# Patient Record
(demographics unavailable — no encounter records)

---

## 2024-10-11 NOTE — BEGINNING OF VISIT
[0] : 2) Feeling down, depressed, or hopeless: Not at all (0) [[id number]] : 0 [Pain Scale: ___] : On a scale of 1-10, today the patient's pain is a(n) [unfilled]. [Never] : Never [Date Discussed (MM/DD/YY): ___] : Discussed: [unfilled]

## 2024-10-11 NOTE — BEGINNING OF VISIT
[0] : 2) Feeling down, depressed, or hopeless: Not at all (0) [[id number]] : 0 [Pain Scale: ___] : On a scale of 1-10, today the patient's pain is a(n) [unfilled]. [Never] : Never [Date Discussed (MM/DD/YY): ___] : Discussed: [unfilled]

## 2024-10-11 NOTE — BEGINNING OF VISIT
[0] : 2) Feeling down, depressed, or hopeless: Not at all (0) [[id number]] : 0 [Pain Scale: ___] : On a scale of 1-10, today the patient's pain is a(n) [unfilled]. [Never] : Never [Date Discussed (MM/DD/YY): ___] : Discussed: [unfilled]

## 2024-10-11 NOTE — BEGINNING OF VISIT
[0] : 2) Feeling down, depressed, or hopeless: Not at all (0) [JQH3Rxqbu] : 0 [Pain Scale: ___] : On a scale of 1-10, today the patient's pain is a(n) [unfilled]. [Never] : Never [Date Discussed (MM/DD/YY): ___] : Discussed: [unfilled]

## 2024-10-11 NOTE — BEGINNING OF VISIT
[0] : 2) Feeling down, depressed, or hopeless: Not at all (0) [[id number]] : 0 [Pain Scale: ___] : On a scale of 1-10, today the patient's pain is a(n) [unfilled]. [Never] : Never [Date Discussed (MM/DD/YY): ___] : Discussed: [unfilled]

## 2024-10-12 NOTE — REASON FOR VISIT
[Follow-Up Visit] : a follow-up [Family Member] : family member [FreeTextEntry2] : metastatic breast ca

## 2024-10-12 NOTE — DISCUSSION/SUMMARY
[FreeTextEntry1] : pt at [facility] getting RT declined surgical fixation pain is controlled with IV meds she is able to ambulate a little with a walker but mostly in bed cx chemo this week rto 10/11

## 2024-10-12 NOTE — HISTORY OF PRESENT ILLNESS
[Disease: _____________________] : Disease: [unfilled] [de-identified] :  This is a very pleasant 61-year-old female presented today for an evaluation for metastatic triple negative breast cancer.  Patient reports she noticed a large lump under her right arm and lymphedema in July 2023.  She went to the emergency room in [place] and workup showed metastatic triple negative breast cancer.  She underwent a right lymph node biopsy which was reportedly triple negative.  Biopsy report is not available for review.  She started palliative intent Taxol and pembrolizumab July 2023.  A PET/CT September 2023 showed mixed response, and she continued on the same regimen.  A PET/CT in January 2024 showed marked progression of disease in the bones.  Treatment was changed to carboplatin gemcitabine and pembrolizumab in February 2024.  She reportedly developed neutropenia and needed Neulasta for subsequent cycles.  PET/CT May 2024 showed stable disease.  Her last chemotherapy treatment was on 5/17/2024.  She decided to move to [place] close to family.  She has not had any treatment since then.  She reports she wanted to take a treatment break and has been taking holistic treatments.  She was disappointed that  neck and armpit masses grew during treatment break.  She was seen at [facility] last month and is referred here for continuation of care.  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  She is fasting 16 to 24 hours and has lost 40 pounds in the last 4 months.  She has significant right hip and left leg pain.  She is taking Dilaudid as needed and gabapentin.  Currently pain is 5 out of 10.  She walks with a cane and pain gets worse on walking after 1-2 blocks.  She had radiation to spine last year and developed significant toxicity and does not want to take radiation anymore. She is currently getting home PT. she reports skin discoloration from radiation and Neulasta.  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  Port has not been placed in 3 months.  She has double-lumen port.  She reports BRCA testing was done but she is not aware of results.  She lives with her parents and her daughter is supporting her.  8/23/2024 Patient presents for Trodelvy Cycle 1, Day 1. She is accompanied by her daughter. All questions asked/answered prior to initiation of treatment.  Patient underwent Port Study on 8/2124 at [facility].  Procedure Findings demonstrated a left-sided double port with tip of catheter in the SVC. The left port was accessed under sterile conditions with blood return and appropriate flushing was demonstrated. Hand injection of contrast demonstrates filling of port well and lumen without evidence of fibrin sheath. Unfortunately, multiple attempts were made with right port however it was unsuccessful. The plan per the Interventional Radiologist included: 1. Left port may be accessed and utilized for her upcoming treatment of chemotherapy. 2. Suggestion of possible replacement with a single lumen Mediport. Ms. Singh c/o of significant pain 10/10 on the pain scale. Reports she ran out of Hydromorphone and Gabapentin that was dispensed in the ER since last week. Ms. Singh reported that her pain was well controlled when taking Hydromorphone and Gabapentin as prescribed. Refills sent to [facility], and delivered at chairside. 4mg of Hydromorphone prescribed to be given at chairside prior to treatment. 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 Pathologic fractures in the right superior pubic ramus and left proximal fibula were noted on recent PET CT scan completed on 8/21/2024. Patient continues to take Eliquis daily (5 mg PO BID) for port r/t thrombus. Patient denies any s/s c/w easily bleeding or bruising.  Ms. Singh requested to speak with Nutrition today. Nutrition team was able to see the patient at the chairside. Appreciate note and recommendations. RTO: with each Cycle of Trodelvy, on Day 1. Appointments reviewed with patient and print out given.   8/28/2024 Patient presents today for repeat blood work and to complete [employer] Testing.  We again discussed the recent results of PTT/INR lab work that was significantly elevated on 8/23/2024. Patient was contacted on 8/26/2024 to initially discuss the elevated lab results. Ms. Singh reported that she was taking the Eliquis 5 mg PO BID as indicated. Upon questioning the patient further regarding the matter, she stated she ran out of the medication and needed a refill. Patient continues to deny as signs of bleeding or bruising. Results of PTT/INR today were WNL, and Eliquis 5mg PO BID was ordered to [facility].   Patient reported taking several supplements to include: Turmeric, Vitamin E, Black Seed Oil and several other non-FDA approved mixed/multi-vitamins up until Day 1 of treatment (8/23/2024). We discussed that these particular supplements may have unknown drug interactions with treatment and can also elevate both PTT & INR. The patient has since discontinued the supplements.   Discussed that [id number] testing is used to identify genomic alterations within the cancer's DNA that may make the patient eligible for a specific therapy/personalized treatment for her cancer. Patient verbalized consent and signed the necessary paperwork. Our office to convey results when they are made available.   Ms. Singh reports her pain is slightly improved since she started re-taking Dilaudid and Gabapentin as prescribed. Referral was made to Palliative Care, and an appointment was scheduled for 18 September 2024.   8/30/2024: day8 of Trodelvy,  She reports significant fatigue and altered taste x 3-4 days after chemo. She was able to function, maintained PO intake, weight stable. She had mild nausea, took Reglan, no vomiting, no diarrhea or mouth sores. No neuropathy, no fevers. Hb low, discussed PRBC tx PET baseline 8/2024 d/w her. She has several pathologic fractures. She doesnt want to go to ER, thinks they arent new. She has been hesitant about RT. Discussed importance of ortho onc consultation and pall RT for bone fractures and pain. No wt beaning until ortho consult.  She is on Eliquis 5 mg PO BID for port-related thrombus. PT/INR was significantly high, normalized on repeat labs  Has R shoulder, pelvis, LE pain somewhat controlled on Dilaudid every 4 hours, gabapentin Q3H. A. Pending palliative care appointment for pain management next month.  Does not have appetite but is taking small portions of meals. lso taking ensure Holding supplements 2 days before and after the chemo: Turmeric, Vitamin E, Black Seed Oil and several other non-FDA approved mixed/multi-vitamins up  [id number] testing sent during last visit Palliative Care, and an appointment was scheduled for 18 September 2024.   9/20/2024 Patient presents today for Cycle 2, Day 8 of Trodelvy.  She reports significant fatigue and altered taste x 3-4 days after chemo. She remains able to function, maintained PO intake, and her weight is stable. She continues to experience mild nausea, but it is improving, and no Reglan was needed after Cycle 2, Day 1. Denies fevers, vomiting, diarrhea, neuropathy or mouth sores.  Discussed low Hgb (8.1 g/dL), and possible need for PRBC transfusion. At this time, the patient denies being symptomatic and wishes to see if her bone marrow can recover on its own. She has been attending numerous appointments (Palliative, Orthopedics, RT) and is becoming overwhelmed.  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 Denies any SOB or palpitations. Ms. Singh has established care with Palliative Care on 18 September 2024 (follow-up scheduled in one week/Telemedicine Visit). Patient reports she enjoyed meeting this provider and was thankful for her recommendations to change the Hydromorphone from Q6 hours to Q4 hours. Additionally, she is now taking Tizanidine 2mg, 1 to 2 tablets every 8 hours for muscle spasms. She reports this medication has made a big difference and reports her overall pain is much more manageable/tolerable. Patient will also continue to take Gabapentin 300 mg TID. She is also now on a stricter bowel regimen taking Colace TID and Miralax daily. She is having BM's every other day.  Ms. Singh also met with Dr. Serena Bustos from Radiation Oncology on 9 September 2024. She reports being much more open to the idea of RT for pain relief and signed informed consent at the initial consultation. It was discussed at that visit a plan of care will be created once the patient established care with Dr. Lauren.  Ms. Singh established care with Dr. Subhash Lauren from Orthopedic Surgery. Patient was offered Right Hip Hemiarthroplasty and surgical stabilization of the Left femur. The patient declined surgical intervention and was instructed to return to the office in 1 month for repeat x-ray imaging.  Patient continues to hold supplements 2 days before and after the chemo: Turmeric, Vitamin E, Black Seed Oil and several other non-FDA approved mixed/multi-vitamins. RTO: with each Cycle of Trodelvy, on Day 8.  [de-identified] : This is a very pleasant 61-year-old female diagnosed with metastatic triple negative breast cancer (metastatic disease to bones and lymph nodes) in July 2023, received Taxol Keytruda 7/202 3-1/2024, POD bones, Carboplatin Gemcitabine and Keytruda 2/202 4-5/2024, then she decided to take a treatment break.  She has been off treatment and reports worsening of palpable metastatic lymph nodes and worsening bone pain. Trodelvy initiated on 8/23/2024.

## 2024-10-12 NOTE — PHYSICAL EXAM
[Ambulatory and capable of all self care but unable to carry out any work activities] : Status 2- Ambulatory and capable of all self care but unable to carry out any work activities. Up and about more than 50% of waking hours [Normal] : affect appropriate [de-identified] : in wheelchair  [de-identified] : large right axillary and right cervical LN

## 2024-10-12 NOTE — PHYSICAL EXAM
[Ambulatory and capable of all self care but unable to carry out any work activities] : Status 2- Ambulatory and capable of all self care but unable to carry out any work activities. Up and about more than 50% of waking hours [Normal] : affect appropriate [de-identified] : in wheelchair  [de-identified] : large right axillary and right cervical LN

## 2024-10-12 NOTE — HISTORY OF PRESENT ILLNESS
[Disease: _____________________] : Disease: [unfilled] [de-identified] :  This is a very pleasant 61-year-old female presented today for an evaluation for metastatic triple negative breast cancer.  Patient reports she noticed a large lump under her right arm and lymphedema in July 2023.  She went to the emergency room in [place] and workup showed metastatic triple negative breast cancer.  She underwent a right lymph node biopsy which was reportedly triple negative.  Biopsy report is not available for review.  She started palliative intent Taxol and pembrolizumab July 2023.  A PET/CT September 2023 showed mixed response, and she continued on the same regimen.  A PET/CT in January 2024 showed marked progression of disease in the bones.  Treatment was changed to carboplatin gemcitabine and pembrolizumab in February 2024.  She reportedly developed neutropenia and needed Neulasta for subsequent cycles.  PET/CT May 2024 showed stable disease.  Her last chemotherapy treatment was on 5/17/2024.  She decided to move to [place] close to family.  She has not had any treatment since then.  She reports she wanted to take a treatment break and has been taking holistic treatments.  She was disappointed that  neck and armpit masses grew during treatment break.  She was seen at [facility] last month and is referred here for continuation of care.  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  She is fasting 16 to 24 hours and has lost 40 pounds in the last 4 months.  She has significant right hip and left leg pain.  She is taking Dilaudid as needed and gabapentin.  Currently pain is 5 out of 10.  She walks with a cane and pain gets worse on walking after 1-2 blocks.  She had radiation to spine last year and developed significant toxicity and does not want to take radiation anymore. She is currently getting home PT. she reports skin discoloration from radiation and Neulasta.  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  Port has not been placed in 3 months.  She has double-lumen port.  She reports BRCA testing was done but she is not aware of results.  She lives with her parents and her daughter is supporting her.  8/23/2024 Patient presents for Trodelvy Cycle 1, Day 1. She is accompanied by her daughter. All questions asked/answered prior to initiation of treatment.  Patient underwent Port Study on 8/2124 at [facility].  Procedure Findings demonstrated a left-sided double port with tip of catheter in the SVC. The left port was accessed under sterile conditions with blood return and appropriate flushing was demonstrated. Hand injection of contrast demonstrates filling of port well and lumen without evidence of fibrin sheath. Unfortunately, multiple attempts were made with right port however it was unsuccessful. The plan per the Interventional Radiologist included: 1. Left port may be accessed and utilized for her upcoming treatment of chemotherapy. 2. Suggestion of possible replacement with a single lumen Mediport. Ms. Singh c/o of significant pain 10/10 on the pain scale. Reports she ran out of Hydromorphone and Gabapentin that was dispensed in the ER since last week. Ms. Singh reported that her pain was well controlled when taking Hydromorphone and Gabapentin as prescribed. Refills sent to "[facility], and delivered at chairside. 4mg of Hydromorphone prescribed to be given at chairside prior to treatment. 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 Pathologic fractures in the right superior pubic ramus and left proximal fibula were noted on recent PET CT scan completed on 8/21/2024. Patient continues to take Eliquis daily (5 mg PO BID) for port r/t thrombus. Patient denies any s/s c/w easily bleeding or bruising.  Ms. Singh requested to speak with Nutrition today. Nutrition team was able to see the patient at the chairside. Appreciate note and recommendations. RTO: with each Cycle of Trodelvy, on Day 1. Appointments reviewed with patient and print out given.   8/28/2024 Patient presents today for repeat blood work and to complete [employer] Testing.  We again discussed the recent results of PTT/INR lab work that was significantly elevated on 8/23/2024. Patient was contacted on 8/26/2024 to initially discuss the elevated lab results. Ms. Singh reported that she was taking the Eliquis 5 mg PO BID as indicated. Upon questioning the patient further regarding the matter, she stated she ran out of the medication and needed a refill. Patient continues to deny as signs of bleeding or bruising. Results of PTT/INR today were WNL, and Eliquis 5mg PO BID was ordered to "[facility].   Patient reported taking several supplements to include: Turmeric, Vitamin E, Black Seed Oil and several other non-FDA approved mixed/multi-vitamins up until Day 1 of treatment (8/23/2024). We discussed that these particular supplements may have unknown drug interactions with treatment and can also elevate both PTT & INR. The patient has since discontinued the supplements.   Discussed that [id number] testing is used to identify genomic alterations within the cancer's DNA that may make the patient eligible for a specific therapy/personalized treatment for her cancer. Patient verbalized consent and signed the necessary paperwork. Our office to convey results when they are made available.   Ms. Singh reports her pain is slightly improved since she started re-taking Dilaudid and Gabapentin as prescribed. Referral was made to Palliative Care, and an appointment was scheduled for 18 September 2024.   8/30/2024: day8 of Trodelvy,  She reports significant fatigue and altered taste x 3-4 days after chemo. She was able to function, maintained PO intake, weight stable. She had mild nausea, took Reglan, no vomiting, no diarrhea or mouth sores. No neuropathy, no fevers. Hb low, discussed PRBC tx PET baseline 8/2024 d/w her. She has several pathologic fractures. She doesnt want to go to ER, thinks they arent new. She has been hesitant about RT. Discussed importance of ortho onc consultation and pall RT for bone fractures and pain. No wt beaning until ortho consult.  She is on Eliquis 5 mg PO BID for port-related thrombus. PT/INR was significantly high, normalized on repeat labs  Has R shoulder, pelvis, LE pain somewhat controlled on Dilaudid every 4 hours, gabapentin Q3H. A. Pending palliative care appointment for pain management next month.  Does not have appetite but is taking small portions of meals. lso taking ensure Holding supplements 2 days before and after the chemo: Turmeric, Vitamin E, Black Seed Oil and several other non-FDA approved mixed/multi-vitamins up  [id number] testing sent during last visit Palliative Care, and an appointment was scheduled for 18 September 2024.   9/20/2024 Patient presents today for Cycle 2, Day 8 of Trodelvy.  She reports significant fatigue and altered taste x 3-4 days after chemo. She remains able to function, maintained PO intake, and her weight is stable. She continues to experience mild nausea, but it is improving, and no Reglan was needed after Cycle 2, Day 1. Denies fevers, vomiting, diarrhea, neuropathy or mouth sores.  Discussed low Hgb (8.1 g/dL), and possible need for PRBC transfusion. At this time, the patient denies being symptomatic and wishes to see if her bone marrow can recover on its own. She has been attending numerous appointments (Palliative, Orthopedics, RT) and is becoming overwhelmed.  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 Denies any SOB or palpitations. Ms. Singh has established care with Palliative Care on 18 September 2024 (follow-up scheduled in one week/Telemedicine Visit). Patient reports she enjoyed meeting this provider and was thankful for her recommendations to change the Hydromorphone from Q6 hours to Q4 hours. Additionally, she is now taking Tizanidine 2mg, 1 to 2 tablets every 8 hours for muscle spasms. She reports this medication has made a big difference and reports her overall pain is much more manageable/tolerable. Patient will also continue to take Gabapentin 300 mg TID. She is also now on a stricter bowel regimen taking Colace TID and Miralax daily. She is having BM's every other day.  Ms. Singh also met with Dr. Serena Bustos from Radiation Oncology on 9 September 2024. She reports being much more open to the idea of RT for pain relief and signed informed consent at the initial consultation. It was discussed at that visit a plan of care will be created once the patient established care with Dr. Lauren.  Ms. Singh established care with Dr. Subhash Lauren from Orthopedic Surgery. Patient was offered Right Hip Hemiarthroplasty and surgical stabilization of the Left femur. The patient declined surgical intervention and was instructed to return to the office in 1 month for repeat x-ray imaging.  Patient continues to hold supplements 2 days before and after the chemo: Turmeric, Vitamin E, Black Seed Oil and several other non-FDA approved mixed/multi-vitamins. RTO: with each Cycle of Trodelvy, on Day 8.  [de-identified] : This is a very pleasant 61-year-old female diagnosed with metastatic triple negative breast cancer (metastatic disease to bones and lymph nodes) in July 2023, received Taxol Keytruda 7/202 3-1/2024, POD bones, Carboplatin Gemcitabine and Keytruda 2/202 4-5/2024, then she decided to take a treatment break.  She has been off treatment and reports worsening of palpable metastatic lymph nodes and worsening bone pain. Trodelvy initiated on 8/23/2024.

## 2024-10-12 NOTE — DISCUSSION/SUMMARY
[FreeTextEntry1] : pt at [facility] getting RT declined surgical fixation pain is controlled with IV meds she is able to ambulate a little with a walker but mostly in bed cx chemo this week rto 10/11

## 2024-10-12 NOTE — HISTORY OF PRESENT ILLNESS
[Disease: _____________________] : Disease: [unfilled] [de-identified] :  This is a very pleasant 61-year-old female presented today for an evaluation for metastatic triple negative breast cancer.  Patient reports she noticed a large lump under her right arm and lymphedema in July 2023.  She went to the emergency room in [place] and workup showed metastatic triple negative breast cancer.  She underwent a right lymph node biopsy which was reportedly triple negative.  Biopsy report is not available for review.  She started palliative intent Taxol and pembrolizumab July 2023.  A PET/CT September 2023 showed mixed response, and she continued on the same regimen.  A PET/CT in January 2024 showed marked progression of disease in the bones.  Treatment was changed to carboplatin gemcitabine and pembrolizumab in February 2024.  She reportedly developed neutropenia and needed Neulasta for subsequent cycles.  PET/CT May 2024 showed stable disease.  Her last chemotherapy treatment was on 5/17/2024.  She decided to move to [place] close to family.  She has not had any treatment since then.  She reports she wanted to take a treatment break and has been taking holistic treatments.  She was disappointed that  neck and armpit masses grew during treatment break.  She was seen at [facility] last month and is referred here for continuation of care.  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  She is fasting 16 to 24 hours and has lost 40 pounds in the last 4 months.  She has significant right hip and left leg pain.  She is taking Dilaudid as needed and gabapentin.  Currently pain is 5 out of 10.  She walks with a cane and pain gets worse on walking after 1-2 blocks.  She had radiation to spine last year and developed significant toxicity and does not want to take radiation anymore. She is currently getting home PT. she reports skin discoloration from radiation and Neulasta.  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  Port has not been placed in 3 months.  She has double-lumen port.  She reports BRCA testing was done but she is not aware of results.  She lives with her parents and her daughter is supporting her.  8/23/2024 Patient presents for Trodelvy Cycle 1, Day 1. She is accompanied by her daughter. All questions asked/answered prior to initiation of treatment.  Patient underwent Port Study on 8/2124 at [facility].  Procedure Findings demonstrated a left-sided double port with tip of catheter in the SVC. The left port was accessed under sterile conditions with blood return and appropriate flushing was demonstrated. Hand injection of contrast demonstrates filling of port well and lumen without evidence of fibrin sheath. Unfortunately, multiple attempts were made with right port however it was unsuccessful. The plan per the Interventional Radiologist included: 1. Left port may be accessed and utilized for her upcoming treatment of chemotherapy. 2. Suggestion of possible replacement with a single lumen Mediport. Ms. Singh c/o of significant pain 10/10 on the pain scale. Reports she ran out of Hydromorphone and Gabapentin that was dispensed in the ER since last week. Ms. Singh reported that her pain was well controlled when taking Hydromorphone and Gabapentin as prescribed. Refills sent to [facility], and delivered at chairside. 4mg of Hydromorphone prescribed to be given at chairside prior to treatment. 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 Pathologic fractures in the right superior pubic ramus and left proximal fibula were noted on recent PET CT scan completed on 8/21/2024. Patient continues to take Eliquis daily (5 mg PO BID) for port r/t thrombus. Patient denies any s/s c/w easily bleeding or bruising.  Ms. Singh requested to speak with Nutrition today. Nutrition team was able to see the patient at the chairside. Appreciate note and recommendations. RTO: with each Cycle of Trodelvy, on Day 1. Appointments reviewed with patient and print out given.   8/28/2024 Patient presents today for repeat blood work and to complete [employer] Testing.  We again discussed the recent results of PTT/INR lab work that was significantly elevated on 8/23/2024. Patient was contacted on 8/26/2024 to initially discuss the elevated lab results. Ms. Singh reported that she was taking the Eliquis 5 mg PO BID as indicated. Upon questioning the patient further regarding the matter, she stated she ran out of the medication and needed a refill. Patient continues to deny as signs of bleeding or bruising. Results of PTT/INR today were WNL, and Eliquis 5mg PO BID was ordered to [facility].   Patient reported taking several supplements to include: Turmeric, Vitamin E, Black Seed Oil and several other non-FDA approved mixed/multi-vitamins up until Day 1 of treatment (8/23/2024). We discussed that these particular supplements may have unknown drug interactions with treatment and can also elevate both PTT & INR. The patient has since discontinued the supplements.   Discussed that [id number] testing is used to identify genomic alterations within the cancer's DNA that may make the patient eligible for a specific therapy/personalized treatment for her cancer. Patient verbalized consent and signed the necessary paperwork. Our office to convey results when they are made available.   Ms. Singh reports her pain is slightly improved since she started re-taking Dilaudid and Gabapentin as prescribed. Referral was made to Palliative Care, and an appointment was scheduled for 18 September 2024.   8/30/2024: day8 of Trodelvy,  She reports significant fatigue and altered taste x 3-4 days after chemo. She was able to function, maintained PO intake, weight stable. She had mild nausea, took Reglan, no vomiting, no diarrhea or mouth sores. No neuropathy, no fevers. Hb low, discussed [facility] tx PET baseline 8/2024 d/w her. She has several pathologic fractures. She doesnt want to go to ER, thinks they arent new. She has been hesitant about RT. Discussed importance of ortho onc consultation and pall RT for bone fractures and pain. No wt beaning until ortho consult.  She is on Eliquis 5 mg PO BID for port-related thrombus. PT/INR was significantly high, normalized on repeat labs  Has R shoulder, pelvis, LE pain somewhat controlled on Dilaudid every 4 hours, gabapentin Q3H. A. Pending palliative care appointment for pain management next month.  Does not have appetite but is taking small portions of meals. lso taking ensure Holding supplements 2 days before and after the chemo: Turmeric, Vitamin E, Black Seed Oil and several other non-FDA approved mixed/multi-vitamins up  [id number] testing sent during last visit Palliative Care, and an appointment was scheduled for 18 September 2024.   9/20/2024 Patient presents today for Cycle 2, Day 8 of Trodelvy.  She reports significant fatigue and altered taste x 3-4 days after chemo. She remains able to function, maintained PO intake, and her weight is stable. She continues to experience mild nausea, but it is improving, and no Reglan was needed after Cycle 2, Day 1. Denies fevers, vomiting, diarrhea, neuropathy or mouth sores.  Discussed low Hgb (8.1 g/dL), and possible need for PRBC transfusion. At this time, the patient denies being symptomatic and wishes to see if her bone marrow can recover on its own. She has been attending numerous appointments (Palliative, Orthopedics, RT) and is becoming overwhelmed.  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 Denies any SOB or palpitations. Ms. Singh has established care with Palliative Care on 18 September 2024 (follow-up scheduled in one week/Telemedicine Visit). Patient reports she enjoyed meeting this provider and was thankful for her recommendations to change the Hydromorphone from Q6 hours to Q4 hours. Additionally, she is now taking Tizanidine 2mg, 1 to 2 tablets every 8 hours for muscle spasms. She reports this medication has made a big difference and reports her overall pain is much more manageable/tolerable. Patient will also continue to take Gabapentin 300 mg TID. She is also now on a stricter bowel regimen taking Colace TID and Miralax daily. She is having BM's every other day.  Ms. Singh also met with Dr. Serena Bustos from Radiation Oncology on 9 September 2024. She reports being much more open to the idea of RT for pain relief and signed informed consent at the initial consultation. It was discussed at that visit a plan of care will be created once the patient established care with Dr. Lauren.  Ms. Singh established care with Dr. Subhash Lauren from Orthopedic Surgery. Patient was offered Right Hip Hemiarthroplasty and surgical stabilization of the Left femur. The patient declined surgical intervention and was instructed to return to the office in 1 month for repeat x-ray imaging.  Patient continues to hold supplements 2 days before and after the chemo: Turmeric, Vitamin E, Black Seed Oil and several other non-FDA approved mixed/multi-vitamins. RTO: with each Cycle of Trodelvy, on Day 8.  [de-identified] : This is a very pleasant 61-year-old female diagnosed with metastatic triple negative breast cancer (metastatic disease to bones and lymph nodes) in July 2023, received Taxol Keytruda 7/202 3-1/2024, POD bones, Carboplatin Gemcitabine and Keytruda 2/202 4-5/2024, then she decided to take a treatment break.  She has been off treatment and reports worsening of palpable metastatic lymph nodes and worsening bone pain. Trodelvy initiated on 8/23/2024.

## 2024-10-12 NOTE — ASSESSMENT
[FreeTextEntry1] : This is a very pleasant 61-year-old female diagnosed with metastatic triple negative breast cancer (metastatic disease to bones and lymph nodes) in July 2023, received Taxol Keytruda 7/202 3-1/2024, POD bones, Carboplatin Gemcitabine and Keytruda 2/202 4-5/2024, then she decided to take a treatment break.  She has been off treatment and reports worsening of palpable metastatic lymph nodes and worsening bone pain. Trodelvy initiated on 8/23/2024.     METASTATIC BREAST CA: Triple Negative (ER: Negative < 1%, GA: Negative < 1%, HER2: Negative, 1+ staining). Slide Review completed by [facility] on 8/14/2024. Original biopsy site: Right Axilla. Biopsy completed at [facility] on 7/12/2023.   9/20/2024 Patient presents today for Cycle 2, Day 8 of Trodelvy.  She reports significant fatigue and altered taste x 3-4 days after chemo. She remains able to function, maintained PO intake, and her weight is stable. She continues to experience mild nausea, but it is improving, and no Reglan was needed after Cycle 2, Day 1. Denies fevers, vomiting, diarrhea, neuropathy or mouth sores.  Patient continues to hold supplements 2 days before and after the chemo: Turmeric, Vitamin E, Black Seed Oil and several other non-FDA approved mixed/multi-vitamins up   CBC reviewed with the patient today to make sure she is not neutropenic from high-risk cancer therapy drug.  We will continue to monitor CBC every 2 to 4 weeks for intense drug monitoring. Patient is on cytotoxic chemotherapy and needs intensive monitoring for severe toxicity. Repeat scans after 3-4 cycles of chemotherapy to monitor for response.  [id number] testing sent during last visit Plan to re-biopsy on progression   MALIGNANT BONE PAIN:  PET baseline 8/2024 d/w her. She has several pathologic fractures. Ms. Singh has established care with Palliative Care on 18 September 2024 (follow-up scheduled in one week/Telemedicine Visit). Patient reports she enjoyed meeting this provider and was thankful for her recommendations to change the Hydromorphone from Q6 hours to Q4 hours. Additionally, she is now taking Tizanidine 2mg, 1 to 2 tablets every 8 hours for muscle spasms. She reports this medication has made a big difference and reports her overall pain is much more manageable/tolerable. Patient will also continue to take Gabapentin 300 mg TID.   Ms. Singh also met with Dr. Serena Bustos from Radiation Oncology on 9 September 2024. She reports being much more open to the idea of RT for pain relief and signed informed consent at the initial consultation. It was discussed at that visit a plan of care will be created once the patient established care with Dr. Lauren.  Ms. Singh established care with Dr. Subhash Lauren from Orthopedic Surgery. Patient was offered Right Hip Hemiarthroplasty and surgical stabilization of the Left femur. The patient declined surgical intervention and was instructed to return to the office in 1 month for repeat x-ray imaging.    LYMPHADENOPATHY: Visible and palpable Right Cervical and Axillary lymphadenopathy. Mild lymphedema secondary. Will continue to monitor.  CHEMO INDUCED NEUROPATHY: Mild. Continue Gabapentin, 300 mg TID.   WEIGHT LOSS: Likely secondary to malignancy as well as intermittent fasting.  Recommend not to fast during chemotherapy as she will need calorie intake. Nutrition consulted on 8/23/2024; patient seen chairside during Trodelvy Cycle 1, Day 1. Appreciate note and recommendations.   PORT ISSUES: H/O port related thrombosis. Patient underwent Port Study on 8/2124 at [facility]. Procedure findings demonstrated a left-sided double port with tip of catheter in the SVC. The left port was accessed under sterile conditions with blood return and appropriate flushing was demonstrated. Hand injection of contrast demonstrates filling of port well and lumen without evidence of fibrin sheath. Unfortunately, multiple attempts were made with right port however it was unsuccessful. The plan per the Interventional Radiologist included: 1. Left port may be accessed and utilized for her upcoming treatment of chemotherapy. 2. Suggestion of possible replacement with a single lumen Mediport. Patient continues to take Eliquis daily (5 mg PO BID) for port r/t thrombus. Patient denies any s/s c/w easily bleeding or bruising.  She is on Eliquis 5 mg PO BID for port-related thrombus. PT/INR was significantly high, normalized on repeat labs.  GENETICS: Obtain BRCA test results to see if she is a candidate for Olaparib.  FATIGUE:  2/2 MALIGNANCY and treatment: encourage increase activity as tolerated. Mild fatigue tolerable/stable, energy waxes/wanes.  - Chemotherapy induced anemia- Grade 3. Recommend PRBC transfusion to maintain Hb> 8.  At this time, the patient denies being symptomatic and wishes to see if her bone marrow can recover on its own. She has been attending numerous appointments (Palliative, Orthopedics, RT) and is becoming overwhelmed.  - Risk for Chemotherapy induced diarrhea- Imodium PRN. Maintain PO hydration. BRAT diet - Risk for Chemotherapy induced N/V- Will get pre-medications with Emend, Dexamethasone and Aloxi. She will continue Dexamethasone for next 3 days for antinausea prophylaxis. She will use Reglan as needed.  - GF induced bone pain- Patient to take Claritin Days 2-7. - Chemotherapy induced dysgeusia, wt loss and fatigue: Encourage oral fluids, small frequent meals.  - Chemo induced neuropathy- continue to monitor. - Alopecia- prescription for wig given.  - Instructed to call office and go directly to the emergency room with fever more than 100.4, shaking chills, productive cough, sore throat, shortness of breath or urinary symptoms. Patient verbalized understanding and agreement. - Emotional support provided, all questions answered.  CHEMO AND BLOOD WORK ORDERED IN [place] FOR TODAY APPTS REVIEWED AND SCHEDULED DURING THIS VISIT RTO: with each Cycle of Trodelvy, on Day 8. Appointments reviewed with patient and print out given.

## 2024-10-12 NOTE — PHYSICAL EXAM
[Ambulatory and capable of all self care but unable to carry out any work activities] : Status 2- Ambulatory and capable of all self care but unable to carry out any work activities. Up and about more than 50% of waking hours [Normal] : affect appropriate [de-identified] : in wheelchair  [de-identified] : large right axillary and right cervical LN

## 2024-10-12 NOTE — HISTORY OF PRESENT ILLNESS
[Disease: _____________________] : Disease: [unfilled] [de-identified] :  This is a very pleasant 61-year-old female presented today for an evaluation for metastatic triple negative breast cancer.  Patient reports she noticed a large lump under her right arm and lymphedema in July 2023.  She went to the emergency room in [place] and workup showed metastatic triple negative breast cancer.  She underwent a right lymph node biopsy which was reportedly triple negative.  Biopsy report is not available for review.  She started palliative intent Taxol and pembrolizumab July 2023.  A PET/CT September 2023 showed mixed response, and she continued on the same regimen.  A PET/CT in January 2024 showed marked progression of disease in the bones.  Treatment was changed to carboplatin gemcitabine and pembrolizumab in February 2024.  She reportedly developed neutropenia and needed Neulasta for subsequent cycles.  PET/CT May 2024 showed stable disease.  Her last chemotherapy treatment was on 5/17/2024.  She decided to move to [place] close to family.  She has not had any treatment since then.  She reports she wanted to take a treatment break and has been taking holistic treatments.  She was disappointed that  neck and armpit masses grew during treatment break.  She was seen at [facility] last month and is referred here for continuation of care.  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  She is fasting 16 to 24 hours and has lost 40 pounds in the last 4 months.  She has significant right hip and left leg pain.  She is taking Dilaudid as needed and gabapentin.  Currently pain is 5 out of 10.  She walks with a cane and pain gets worse on walking after 1-2 blocks.  She had radiation to spine last year and developed significant toxicity and does not want to take radiation anymore. She is currently getting home PT. she reports skin discoloration from radiation and Neulasta.  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  Port has not been placed in 3 months.  She has double-lumen port.  She reports BRCA testing was done but she is not aware of results.  She lives with her parents and her daughter is supporting her.  8/23/2024 Patient presents for Trodelvy Cycle 1, Day 1. She is accompanied by her daughter. All questions asked/answered prior to initiation of treatment.  Patient underwent Port Study on 8/2124 at [facility].  Procedure Findings demonstrated a left-sided double port with tip of catheter in the SVC. The left port was accessed under sterile conditions with blood return and appropriate flushing was demonstrated. Hand injection of contrast demonstrates filling of port well and lumen without evidence of fibrin sheath. Unfortunately, multiple attempts were made with right port however it was unsuccessful. The plan per the Interventional Radiologist included: 1. Left port may be accessed and utilized for her upcoming treatment of chemotherapy. 2. Suggestion of possible replacement with a single lumen Mediport. Ms. Singh c/o of significant pain 10/10 on the pain scale. Reports she ran out of Hydromorphone and Gabapentin that was dispensed in the ER since last week. Ms. Singh reported that her pain was well controlled when taking Hydromorphone and Gabapentin as prescribed. Refills sent to [facility], and delivered at chairside. 4mg of Hydromorphone prescribed to be given at chairside prior to treatment. 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 Pathologic fractures in the right superior pubic ramus and left proximal fibula were noted on recent PET CT scan completed on 8/21/2024. Patient continues to take Eliquis daily (5 mg PO BID) for port r/t thrombus. Patient denies any s/s c/w easily bleeding or bruising.  Ms. Singh requested to speak with Nutrition today. Nutrition team was able to see the patient at the chairside. Appreciate note and recommendations. RTO: with each Cycle of Trodelvy, on Day 1. Appointments reviewed with patient and print out given.   8/28/2024 Patient presents today for repeat blood work and to complete [employer] Testing.  We again discussed the recent results of PTT/INR lab work that was significantly elevated on 8/23/2024. Patient was contacted on 8/26/2024 to initially discuss the elevated lab results. Ms. Singh reported that she was taking the Eliquis 5 mg PO BID as indicated. Upon questioning the patient further regarding the matter, she stated she ran out of the medication and needed a refill. Patient continues to deny as signs of bleeding or bruising. Results of PTT/INR today were WNL, and Eliquis 5mg PO BID was ordered to [facility].   Patient reported taking several supplements to include: Turmeric, Vitamin E, Black Seed Oil and several other non-FDA approved mixed/multi-vitamins up until Day 1 of treatment (8/23/2024). We discussed that these particular supplements may have unknown drug interactions with treatment and can also elevate both PTT & INR. The patient has since discontinued the supplements.   Discussed that [id number] testing is used to identify genomic alterations within the cancer's DNA that may make the patient eligible for a specific therapy/personalized treatment for her cancer. Patient verbalized consent and signed the necessary paperwork. Our office to convey results when they are made available.   Ms. Singh reports her pain is slightly improved since she started re-taking Dilaudid and Gabapentin as prescribed. Referral was made to Palliative Care, and an appointment was scheduled for 18 September 2024.   8/30/2024: day8 of Trodelvy,  She reports significant fatigue and altered taste x 3-4 days after chemo. She was able to function, maintained PO intake, weight stable. She had mild nausea, took Reglan, no vomiting, no diarrhea or mouth sores. No neuropathy, no fevers. Hb low, discussed PRBC tx PET baseline 8/2024 d/w her. She has several pathologic fractures. She doesnt want to go to ER, thinks they arent new. She has been hesitant about RT. Discussed importance of ortho onc consultation and pall RT for bone fractures and pain. No wt beaning until ortho consult.  She is on Eliquis 5 mg PO BID for port-related thrombus. PT/INR was significantly high, normalized on repeat labs  Has R shoulder, pelvis, LE pain somewhat controlled on Dilaudid every 4 hours, gabapentin Q3H. A. Pending palliative care appointment for pain management next month.  Does not have appetite but is taking small portions of meals. lso taking ensure Holding supplements 2 days before and after the chemo: Turmeric, Vitamin E, Black Seed Oil and several other non-FDA approved mixed/multi-vitamins up  [id number] testing sent during last visit Palliative Care, and an appointment was scheduled for 18 September 2024.   9/20/2024 Patient presents today for Cycle 2, Day 8 of Trodelvy.  She reports significant fatigue and altered taste x 3-4 days after chemo. She remains able to function, maintained PO intake, and her weight is stable. She continues to experience mild nausea, but it is improving, and no Reglan was needed after Cycle 2, Day 1. Denies fevers, vomiting, diarrhea, neuropathy or mouth sores.  Discussed low Hgb (8.1 g/dL), and possible need for PRBC transfusion. At this time, the patient denies being symptomatic and wishes to see if her bone marrow can recover on its own. She has been attending numerous appointments (Palliative, Orthopedics, RT) and is becoming overwhelmed.  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 Denies any SOB or palpitations. Ms. Singh has established care with Palliative Care on 18 September 2024 (follow-up scheduled in one week/Telemedicine Visit). Patient reports she enjoyed meeting this provider and was thankful for her recommendations to change the Hydromorphone from Q6 hours to Q4 hours. Additionally, she is now taking Tizanidine 2mg, 1 to 2 tablets every 8 hours for muscle spasms. She reports this medication has made a big difference and reports her overall pain is much more manageable/tolerable. Patient will also continue to take Gabapentin 300 mg TID. She is also now on a stricter bowel regimen taking Colace TID and Miralax daily. She is having BM's every other day.  Ms. Singh also met with Dr. Serena Bustos from Radiation Oncology on 9 September 2024. She reports being much more open to the idea of RT for pain relief and signed informed consent at the initial consultation. It was discussed at that visit a plan of care will be created once the patient established care with Dr. Lauren.  Ms. Singh established care with Dr. Subhash Lauren from Orthopedic Surgery. Patient was offered Right Hip Hemiarthroplasty and surgical stabilization of the Left femur. The patient declined surgical intervention and was instructed to return to the office in 1 month for repeat x-ray imaging.  Patient continues to hold supplements 2 days before and after the chemo: Turmeric, Vitamin E, Black Seed Oil and several other non-FDA approved mixed/multi-vitamins. RTO: with each Cycle of Trodelvy, on Day 8.  [de-identified] : This is a very pleasant 61-year-old female diagnosed with metastatic triple negative breast cancer (metastatic disease to bones and lymph nodes) in July 2023, received Taxol Keytruda 7/202 3-1/2024, POD bones, Carboplatin Gemcitabine and Keytruda 2/202 4-5/2024, then she decided to take a treatment break.  She has been off treatment and reports worsening of palpable metastatic lymph nodes and worsening bone pain. Trodelvy initiated on 8/23/2024.

## 2024-10-12 NOTE — PHYSICAL EXAM
[Ambulatory and capable of all self care but unable to carry out any work activities] : Status 2- Ambulatory and capable of all self care but unable to carry out any work activities. Up and about more than 50% of waking hours [Normal] : affect appropriate [de-identified] : in wheelchair  [de-identified] : large right axillary and right cervical LN

## 2024-10-12 NOTE — ASSESSMENT
[FreeTextEntry1] : This is a very pleasant 61-year-old female diagnosed with metastatic triple negative breast cancer (metastatic disease to bones and lymph nodes) in July 2023, received Taxol Keytruda 7/202 3-1/2024, POD bones, Carboplatin Gemcitabine and Keytruda 2/202 4-5/2024, then she decided to take a treatment break.  She has been off treatment and reports worsening of palpable metastatic lymph nodes and worsening bone pain. Trodelvy initiated on 8/23/2024.     METASTATIC BREAST CA: Triple Negative (ER: Negative < 1%, HI: Negative < 1%, HER2: Negative, 1+ staining). Slide Review completed by [facility] on 8/14/2024. Original biopsy site: Right Axilla. Biopsy completed at [facility] on 7/12/2023.   9/20/2024 Patient presents today for Cycle 2, Day 8 of Trodelvy.  She reports significant fatigue and altered taste x 3-4 days after chemo. She remains able to function, maintained PO intake, and her weight is stable. She continues to experience mild nausea, but it is improving, and no Reglan was needed after Cycle 2, Day 1. Denies fevers, vomiting, diarrhea, neuropathy or mouth sores.  Patient continues to hold supplements 2 days before and after the chemo: Turmeric, Vitamin E, Black Seed Oil and several other non-FDA approved mixed/multi-vitamins up   CBC reviewed with the patient today to make sure she is not neutropenic from high-risk cancer therapy drug.  We will continue to monitor CBC every 2 to 4 weeks for intense drug monitoring. Patient is on cytotoxic chemotherapy and needs intensive monitoring for severe toxicity. Repeat scans after 3-4 cycles of chemotherapy to monitor for response.  [id number] testing sent during last visit Plan to re-biopsy on progression   MALIGNANT BONE PAIN:  PET baseline 8/2024 d/w her. She has several pathologic fractures. Ms. Singh has established care with Palliative Care on 18 September 2024 (follow-up scheduled in one week/Telemedicine Visit). Patient reports she enjoyed meeting this provider and was thankful for her recommendations to change the Hydromorphone from Q6 hours to Q4 hours. Additionally, she is now taking Tizanidine 2mg, 1 to 2 tablets every 8 hours for muscle spasms. She reports this medication has made a big difference and reports her overall pain is much more manageable/tolerable. Patient will also continue to take Gabapentin 300 mg TID.   Ms. Singh also met with Dr. Serena Bustos from Radiation Oncology on 9 September 2024. She reports being much more open to the idea of RT for pain relief and signed informed consent at the initial consultation. It was discussed at that visit a plan of care will be created once the patient established care with Dr. Lauren.  Ms. Singh established care with Dr. Subhash Lauren from Orthopedic Surgery. Patient was offered Right Hip Hemiarthroplasty and surgical stabilization of the Left femur. The patient declined surgical intervention and was instructed to return to the office in 1 month for repeat x-ray imaging.    LYMPHADENOPATHY: Visible and palpable Right Cervical and Axillary lymphadenopathy. Mild lymphedema secondary. Will continue to monitor.  CHEMO INDUCED NEUROPATHY: Mild. Continue Gabapentin, 300 mg TID.   WEIGHT LOSS: Likely secondary to malignancy as well as intermittent fasting.  Recommend not to fast during chemotherapy as she will need calorie intake. Nutrition consulted on 8/23/2024; patient seen chairside during Trodelvy Cycle 1, Day 1. Appreciate note and recommendations.   PORT ISSUES: H/O port related thrombosis. Patient underwent Port Study on 8/2124 at [facility]. Procedure findings demonstrated a left-sided double port with tip of catheter in the SVC. The left port was accessed under sterile conditions with blood return and appropriate flushing was demonstrated. Hand injection of contrast demonstrates filling of port well and lumen without evidence of fibrin sheath. Unfortunately, multiple attempts were made with right port however it was unsuccessful. The plan per the Interventional Radiologist included: 1. Left port may be accessed and utilized for her upcoming treatment of chemotherapy. 2. Suggestion of possible replacement with a single lumen Mediport. Patient continues to take Eliquis daily (5 mg PO BID) for port r/t thrombus. Patient denies any s/s c/w easily bleeding or bruising.  She is on Eliquis 5 mg PO BID for port-related thrombus. PT/INR was significantly high, normalized on repeat labs.  GENETICS: Obtain BRCA test results to see if she is a candidate for Olaparib.  FATIGUE:  2/2 MALIGNANCY and treatment: encourage increase activity as tolerated. Mild fatigue tolerable/stable, energy waxes/wanes.  - Chemotherapy induced anemia- Grade 3. Recommend PRBC transfusion to maintain Hb> 8.  At this time, the patient denies being symptomatic and wishes to see if her bone marrow can recover on its own. She has been attending numerous appointments (Palliative, Orthopedics, RT) and is becoming overwhelmed.  - Risk for Chemotherapy induced diarrhea- Imodium PRN. Maintain PO hydration. BRAT diet - Risk for Chemotherapy induced N/V- Will get pre-medications with Emend, Dexamethasone and Aloxi. She will continue Dexamethasone for next 3 days for antinausea prophylaxis. She will use Reglan as needed.  - GF induced bone pain- Patient to take Claritin Days 2-7. - Chemotherapy induced dysgeusia, wt loss and fatigue: Encourage oral fluids, small frequent meals.  - Chemo induced neuropathy- continue to monitor. - Alopecia- prescription for wig given.  - Instructed to call office and go directly to the emergency room with fever more than 100.4, shaking chills, productive cough, sore throat, shortness of breath or urinary symptoms. Patient verbalized understanding and agreement. - Emotional support provided, all questions answered.  CHEMO AND BLOOD WORK ORDERED IN [place] FOR TODAY APPTS REVIEWED AND SCHEDULED DURING THIS VISIT RTO: with each Cycle of Trodelvy, on Day 8. Appointments reviewed with patient and print out given.

## 2024-10-12 NOTE — ASSESSMENT
[FreeTextEntry1] : This is a very pleasant 61-year-old female diagnosed with metastatic triple negative breast cancer (metastatic disease to bones and lymph nodes) in July 2023, received Taxol Keytruda 7/202 3-1/2024, POD bones, Carboplatin Gemcitabine and Keytruda 2/202 4-5/2024, then she decided to take a treatment break.  She has been off treatment and reports worsening of palpable metastatic lymph nodes and worsening bone pain. Trodelvy initiated on 8/23/2024.     METASTATIC BREAST CA: Triple Negative (ER: Negative < 1%, NC: Negative < 1%, HER2: Negative, 1+ staining). Slide Review completed by [facility] on 8/14/2024. Original biopsy site: Right Axilla. Biopsy completed at [facility] on 7/12/2023.   9/20/2024 Patient presents today for Cycle 2, Day 8 of Trodelvy.  She reports significant fatigue and altered taste x 3-4 days after chemo. She remains able to function, maintained PO intake, and her weight is stable. She continues to experience mild nausea, but it is improving, and no Reglan was needed after Cycle 2, Day 1. Denies fevers, vomiting, diarrhea, neuropathy or mouth sores.  Patient continues to hold supplements 2 days before and after the chemo: Turmeric, Vitamin E, Black Seed Oil and several other non-FDA approved mixed/multi-vitamins up   CBC reviewed with the patient today to make sure she is not neutropenic from high-risk cancer therapy drug.  We will continue to monitor CBC every 2 to 4 weeks for intense drug monitoring. Patient is on cytotoxic chemotherapy and needs intensive monitoring for severe toxicity. Repeat scans after 3-4 cycles of chemotherapy to monitor for response.  [id number] testing sent during last visit Plan to re-biopsy on progression   MALIGNANT BONE PAIN:  PET baseline 8/2024 d/w her. She has several pathologic fractures. Ms. Singh has established care with Palliative Care on 18 September 2024 (follow-up scheduled in one week/Telemedicine Visit). Patient reports she enjoyed meeting this provider and was thankful for her recommendations to change the Hydromorphone from Q6 hours to Q4 hours. Additionally, she is now taking Tizanidine 2mg, 1 to 2 tablets every 8 hours for muscle spasms. She reports this medication has made a big difference and reports her overall pain is much more manageable/tolerable. Patient will also continue to take Gabapentin 300 mg TID.   Ms. Singh also met with Dr. Serena Bustos from Radiation Oncology on 9 September 2024. She reports being much more open to the idea of RT for pain relief and signed informed consent at the initial consultation. It was discussed at that visit a plan of care will be created once the patient established care with Dr. Lauren.  Ms. Singh established care with Dr. Subhash Lauren from Orthopedic Surgery. Patient was offered Right Hip Hemiarthroplasty and surgical stabilization of the Left femur. The patient declined surgical intervention and was instructed to return to the office in 1 month for repeat x-ray imaging.    LYMPHADENOPATHY: Visible and palpable Right Cervical and Axillary lymphadenopathy. Mild lymphedema secondary. Will continue to monitor.  CHEMO INDUCED NEUROPATHY: Mild. Continue Gabapentin, 300 mg TID.   WEIGHT LOSS: Likely secondary to malignancy as well as intermittent fasting.  Recommend not to fast during chemotherapy as she will need calorie intake. Nutrition consulted on 8/23/2024; patient seen chairside during Trodelvy Cycle 1, Day 1. Appreciate note and recommendations.   PORT ISSUES: H/O port related thrombosis. Patient underwent Port Study on 8/2124 at [facility]. Procedure findings demonstrated a left-sided double port with tip of catheter in the SVC. The left port was accessed under sterile conditions with blood return and appropriate flushing was demonstrated. Hand injection of contrast demonstrates filling of port well and lumen without evidence of fibrin sheath. Unfortunately, multiple attempts were made with right port however it was unsuccessful. The plan per the Interventional Radiologist included: 1. Left port may be accessed and utilized for her upcoming treatment of chemotherapy. 2. Suggestion of possible replacement with a single lumen Mediport. Patient continues to take Eliquis daily (5 mg PO BID) for port r/t thrombus. Patient denies any s/s c/w easily bleeding or bruising.  She is on Eliquis 5 mg PO BID for port-related thrombus. PT/INR was significantly high, normalized on repeat labs.  GENETICS: Obtain BRCA test results to see if she is a candidate for Olaparib.  FATIGUE:  2/2 MALIGNANCY and treatment: encourage increase activity as tolerated. Mild fatigue tolerable/stable, energy waxes/wanes.  - Chemotherapy induced anemia- Grade 3. Recommend PRBC transfusion to maintain Hb> 8.  At this time, the patient denies being symptomatic and wishes to see if her bone marrow can recover on its own. She has been attending numerous appointments (Palliative, Orthopedics, RT) and is becoming overwhelmed.  - Risk for Chemotherapy induced diarrhea- Imodium PRN. Maintain PO hydration. BRAT diet - Risk for Chemotherapy induced N/V- Will get pre-medications with Emend, Dexamethasone and Aloxi. She will continue Dexamethasone for next 3 days for antinausea prophylaxis. She will use Reglan as needed.  - GF induced bone pain- Patient to take Claritin Days 2-7. - Chemotherapy induced dysgeusia, wt loss and fatigue: Encourage oral fluids, small frequent meals.  - Chemo induced neuropathy- continue to monitor. - Alopecia- prescription for wig given.  - Instructed to call office and go directly to the emergency room with fever more than 100.4, shaking chills, productive cough, sore throat, shortness of breath or urinary symptoms. Patient verbalized understanding and agreement. - Emotional support provided, all questions answered.  CHEMO AND BLOOD WORK ORDERED IN [place] FOR TODAY APPTS REVIEWED AND SCHEDULED DURING THIS VISIT RTO: with each Cycle of Trodelvy, on Day 8. Appointments reviewed with patient and print out given.

## 2024-10-12 NOTE — DISCUSSION/SUMMARY
[FreeTextEntry1] : pt at [facility] getting RT declined surgical fixation pain is controlled with IV meds she is able to ambulate a little with a walker but mostly in bed cx chemo this week rto 10/11

## 2024-10-12 NOTE — ASSESSMENT
[FreeTextEntry1] : This is a very pleasant 61-year-old female diagnosed with metastatic triple negative breast cancer (metastatic disease to bones and lymph nodes) in July 2023, received Taxol Keytruda 7/202 3-1/2024, POD bones, Carboplatin Gemcitabine and Keytruda 2/202 4-5/2024, then she decided to take a treatment break.  She has been off treatment and reports worsening of palpable metastatic lymph nodes and worsening bone pain. Trodelvy initiated on 8/23/2024.     METASTATIC BREAST CA: Triple Negative (ER: Negative < 1%, AK: Negative < 1%, HER2: Negative, 1+ staining). Slide Review completed by [facility] on 8/14/2024. Original biopsy site: Right Axilla. Biopsy completed at [facility] on 7/12/2023.   9/20/2024 Patient presents today for Cycle 2, Day 8 of Trodelvy.  She reports significant fatigue and altered taste x 3-4 days after chemo. She remains able to function, maintained PO intake, and her weight is stable. She continues to experience mild nausea, but it is improving, and no Reglan was needed after Cycle 2, Day 1. Denies fevers, vomiting, diarrhea, neuropathy or mouth sores.  Patient continues to hold supplements 2 days before and after the chemo: Turmeric, Vitamin E, Black Seed Oil and several other non-FDA approved mixed/multi-vitamins up   CBC reviewed with the patient today to make sure she is not neutropenic from high-risk cancer therapy drug.  We will continue to monitor CBC every 2 to 4 weeks for intense drug monitoring. Patient is on cytotoxic chemotherapy and needs intensive monitoring for severe toxicity. Repeat scans after 3-4 cycles of chemotherapy to monitor for response.  [id number] testing sent during last visit Plan to re-biopsy on progression   MALIGNANT BONE PAIN:  PET baseline 8/2024 d/w her. She has several pathologic fractures. Ms. Singh has established care with Palliative Care on 18 September 2024 (follow-up scheduled in one week/Telemedicine Visit). Patient reports she enjoyed meeting this provider and was thankful for her recommendations to change the Hydromorphone from Q6 hours to Q4 hours. Additionally, she is now taking Tizanidine 2mg, 1 to 2 tablets every 8 hours for muscle spasms. She reports this medication has made a big difference and reports her overall pain is much more manageable/tolerable. Patient will also continue to take Gabapentin 300 mg TID.   Ms. Singh also met with Dr. Serena Bustos from Radiation Oncology on 9 September 2024. She reports being much more open to the idea of RT for pain relief and signed informed consent at the initial consultation. It was discussed at that visit a plan of care will be created once the patient established care with Dr. Lauren.  Ms. Singh established care with Dr. Subhash Lauren from Orthopedic Surgery. Patient was offered Right Hip Hemiarthroplasty and surgical stabilization of the Left femur. The patient declined surgical intervention and was instructed to return to the office in 1 month for repeat x-ray imaging.    LYMPHADENOPATHY: Visible and palpable Right Cervical and Axillary lymphadenopathy. Mild lymphedema secondary. Will continue to monitor.  CHEMO INDUCED NEUROPATHY: Mild. Continue Gabapentin, 300 mg TID.   WEIGHT LOSS: Likely secondary to malignancy as well as intermittent fasting.  Recommend not to fast during chemotherapy as she will need calorie intake. Nutrition consulted on 8/23/2024; patient seen chairside during Trodelvy Cycle 1, Day 1. Appreciate note and recommendations.   PORT ISSUES: H/O port related thrombosis. Patient underwent Port Study on 8/2124 at [facility]. Procedure findings demonstrated a left-sided double port with tip of catheter in the SVC. The left port was accessed under sterile conditions with blood return and appropriate flushing was demonstrated. Hand injection of contrast demonstrates filling of port well and lumen without evidence of fibrin sheath. Unfortunately, multiple attempts were made with right port however it was unsuccessful. The plan per the Interventional Radiologist included: 1. Left port may be accessed and utilized for her upcoming treatment of chemotherapy. 2. Suggestion of possible replacement with a single lumen Mediport. Patient continues to take Eliquis daily (5 mg PO BID) for port r/t thrombus. Patient denies any s/s c/w easily bleeding or bruising.  She is on Eliquis 5 mg PO BID for port-related thrombus. PT/INR was significantly high, normalized on repeat labs.  GENETICS: Obtain BRCA test results to see if she is a candidate for Olaparib.  FATIGUE:  2/2 MALIGNANCY and treatment: encourage increase activity as tolerated. Mild fatigue tolerable/stable, energy waxes/wanes.  - Chemotherapy induced anemia- Grade 3. Recommend PRBC transfusion to maintain Hb> 8.  At this time, the patient denies being symptomatic and wishes to see if her bone marrow can recover on its own. She has been attending numerous appointments (Palliative, Orthopedics, RT) and is becoming overwhelmed.  - Risk for Chemotherapy induced diarrhea- Imodium PRN. Maintain PO hydration. BRAT diet - Risk for Chemotherapy induced N/V- Will get pre-medications with Emend, Dexamethasone and Aloxi. She will continue Dexamethasone for next 3 days for antinausea prophylaxis. She will use Reglan as needed.  - GF induced bone pain- Patient to take Claritin Days 2-7. - Chemotherapy induced dysgeusia, wt loss and fatigue: Encourage oral fluids, small frequent meals.  - Chemo induced neuropathy- continue to monitor. - Alopecia- prescription for wig given.  - Instructed to call office and go directly to the emergency room with fever more than 100.4, shaking chills, productive cough, sore throat, shortness of breath or urinary symptoms. Patient verbalized understanding and agreement. - Emotional support provided, all questions answered.  CHEMO AND BLOOD WORK ORDERED IN [place] FOR TODAY APPTS REVIEWED AND SCHEDULED DURING THIS VISIT RTO: with each Cycle of Trodelvy, on Day 8. Appointments reviewed with patient and print out given.

## 2024-10-12 NOTE — DISCUSSION/SUMMARY
[FreeTextEntry1] : pt at [facility] getting RT declined surgical fixation pain is controlled with IV meds she is able to ambulate a little with a walker but mostly in bed cx chemo this week rto 10/11

## 2024-10-12 NOTE — PHYSICAL EXAM
[Ambulatory and capable of all self care but unable to carry out any work activities] : Status 2- Ambulatory and capable of all self care but unable to carry out any work activities. Up and about more than 50% of waking hours [Normal] : affect appropriate [de-identified] : in wheelchair  [de-identified] : large right axillary and right cervical LN

## 2024-10-12 NOTE — ASSESSMENT
[FreeTextEntry1] : This is a very pleasant 61-year-old female diagnosed with metastatic triple negative breast cancer (metastatic disease to bones and lymph nodes) in July 2023, received Taxol Keytruda 7/202 3-1/2024, POD bones, Carboplatin Gemcitabine and Keytruda 2/202 4-5/2024, then she decided to take a treatment break.  She has been off treatment and reports worsening of palpable metastatic lymph nodes and worsening bone pain. Trodelvy initiated on 8/23/2024.     METASTATIC BREAST CA: Triple Negative (ER: Negative < 1%, MO: Negative < 1%, HER2: Negative, 1+ staining). Slide Review completed by [facility] on 8/14/2024. Original biopsy site: Right Axilla. Biopsy completed at [facility] on 7/12/2023.   9/20/2024 Patient presents today for Cycle 2, Day 8 of Trodelvy.  She reports significant fatigue and altered taste x 3-4 days after chemo. She remains able to function, maintained PO intake, and her weight is stable. She continues to experience mild nausea, but it is improving, and no Reglan was needed after Cycle 2, Day 1. Denies fevers, vomiting, diarrhea, neuropathy or mouth sores.  Patient continues to hold supplements 2 days before and after the chemo: Turmeric, Vitamin E, Black Seed Oil and several other non-FDA approved mixed/multi-vitamins up   CBC reviewed with the patient today to make sure she is not neutropenic from high-risk cancer therapy drug.  We will continue to monitor CBC every 2 to 4 weeks for intense drug monitoring. Patient is on cytotoxic chemotherapy and needs intensive monitoring for severe toxicity. Repeat scans after 3-4 cycles of chemotherapy to monitor for response.  [id number] testing sent during last visit Plan to re-biopsy on progression   MALIGNANT BONE PAIN:  PET baseline 8/2024 d/w her. She has several pathologic fractures. Ms. Singh has established care with Palliative Care on 18 September 2024 (follow-up scheduled in one week/Telemedicine Visit). Patient reports she enjoyed meeting this provider and was thankful for her recommendations to change the Hydromorphone from Q6 hours to Q4 hours. Additionally, she is now taking Tizanidine 2mg, 1 to 2 tablets every 8 hours for muscle spasms. She reports this medication has made a big difference and reports her overall pain is much more manageable/tolerable. Patient will also continue to take Gabapentin 300 mg TID.   Ms. Singh also met with Dr. Sreena Bustos from Radiation Oncology on 9 September 2024. She reports being much more open to the idea of RT for pain relief and signed informed consent at the initial consultation. It was discussed at that visit a plan of care will be created once the patient established care with Dr. Lauren.  Ms. Singh established care with Dr. Subhash Lauren from Orthopedic Surgery. Patient was offered Right Hip Hemiarthroplasty and surgical stabilization of the Left femur. The patient declined surgical intervention and was instructed to return to the office in 1 month for repeat x-ray imaging.    LYMPHADENOPATHY: Visible and palpable Right Cervical and Axillary lymphadenopathy. Mild lymphedema secondary. Will continue to monitor.  CHEMO INDUCED NEUROPATHY: Mild. Continue Gabapentin, 300 mg TID.   WEIGHT LOSS: Likely secondary to malignancy as well as intermittent fasting.  Recommend not to fast during chemotherapy as she will need calorie intake. Nutrition consulted on 8/23/2024; patient seen chairside during Trodelvy Cycle 1, Day 1. Appreciate note and recommendations.   PORT ISSUES: H/O port related thrombosis. Patient underwent Port Study on 8/2124 at [facility]. Procedure findings demonstrated a left-sided double port with tip of catheter in the SVC. The left port was accessed under sterile conditions with blood return and appropriate flushing was demonstrated. Hand injection of contrast demonstrates filling of port well and lumen without evidence of fibrin sheath. Unfortunately, multiple attempts were made with right port however it was unsuccessful. The plan per the Interventional Radiologist included: 1. Left port may be accessed and utilized for her upcoming treatment of chemotherapy. 2. Suggestion of possible replacement with a single lumen Mediport. Patient continues to take Eliquis daily (5 mg PO BID) for port r/t thrombus. Patient denies any s/s c/w easily bleeding or bruising.  She is on Eliquis 5 mg PO BID for port-related thrombus. PT/INR was significantly high, normalized on repeat labs.  GENETICS: Obtain BRCA test results to see if she is a candidate for Olaparib.  FATIGUE:  2/2 MALIGNANCY and treatment: encourage increase activity as tolerated. Mild fatigue tolerable/stable, energy waxes/wanes.  - Chemotherapy induced anemia- Grade 3. Recommend PRBC transfusion to maintain Hb> 8.  At this time, the patient denies being symptomatic and wishes to see if her bone marrow can recover on its own. She has been attending numerous appointments (Palliative, Orthopedics, RT) and is becoming overwhelmed.  - Risk for Chemotherapy induced diarrhea- Imodium PRN. Maintain PO hydration. BRAT diet - Risk for Chemotherapy induced N/V- Will get pre-medications with Emend, Dexamethasone and Aloxi. She will continue Dexamethasone for next 3 days for antinausea prophylaxis. She will use Reglan as needed.  - GF induced bone pain- Patient to take Claritin Days 2-7. - Chemotherapy induced dysgeusia, wt loss and fatigue: Encourage oral fluids, small frequent meals.  - Chemo induced neuropathy- continue to monitor. - Alopecia- prescription for wig given.  - Instructed to call office and go directly to the emergency room with fever more than 100.4, shaking chills, productive cough, sore throat, shortness of breath or urinary symptoms. Patient verbalized understanding and agreement. - Emotional support provided, all questions answered.  CHEMO AND BLOOD WORK ORDERED IN [place] FOR TODAY APPTS REVIEWED AND SCHEDULED DURING THIS VISIT RTO: with each Cycle of Trodelvy, on Day 8. Appointments reviewed with patient and print out given.

## 2024-10-12 NOTE — HISTORY OF PRESENT ILLNESS
[Disease: _____________________] : Disease: [unfilled] [de-identified] :  This is a very pleasant 61-year-old female presented today for an evaluation for metastatic triple negative breast cancer.  Patient reports she noticed a large lump under her right arm and lymphedema in July 2023.  She went to the emergency room in [place] and workup showed metastatic triple negative breast cancer.  She underwent a right lymph node biopsy which was reportedly triple negative.  Biopsy report is not available for review.  She started palliative intent Taxol and pembrolizumab July 2023.  A PET/CT September 2023 showed mixed response, and she continued on the same regimen.  A PET/CT in January 2024 showed marked progression of disease in the bones.  Treatment was changed to carboplatin gemcitabine and pembrolizumab in February 2024.  She reportedly developed neutropenia and needed Neulasta for subsequent cycles.  PET/CT May 2024 showed stable disease.  Her last chemotherapy treatment was on 5/17/2024.  She decided to move to [place] close to family.  She has not had any treatment since then.  She reports she wanted to take a treatment break and has been taking holistic treatments.  She was disappointed that  neck and armpit masses grew during treatment break.  She was seen at [facility] last month and is referred here for continuation of care.  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  She is fasting 16 to 24 hours and has lost 40 pounds in the last 4 months.  She has significant right hip and left leg pain.  She is taking Dilaudid as needed and gabapentin.  Currently pain is 5 out of 10.  She walks with a cane and pain gets worse on walking after 1-2 blocks.  She had radiation to spine last year and developed significant toxicity and does not want to take radiation anymore. She is currently getting home PT. she reports skin discoloration from radiation and Neulasta.  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  Port has not been placed in 3 months.  She has double-lumen port.  She reports BRCA testing was done but she is not aware of results.  She lives with her parents and her daughter is supporting her.  8/23/2024 Patient presents for Trodelvy Cycle 1, Day 1. She is accompanied by her daughter. All questions asked/answered prior to initiation of treatment.  Patient underwent Port Study on 8/2124 at [facility].  Procedure Findings demonstrated a left-sided double port with tip of catheter in the SVC. The left port was accessed under sterile conditions with blood return and appropriate flushing was demonstrated. Hand injection of contrast demonstrates filling of port well and lumen without evidence of fibrin sheath. Unfortunately, multiple attempts were made with right port however it was unsuccessful. The plan per the Interventional Radiologist included: 1. Left port may be accessed and utilized for her upcoming treatment of chemotherapy. 2. Suggestion of possible replacement with a single lumen Mediport. Ms. Singh c/o of significant pain 10/10 on the pain scale. Reports she ran out of Hydromorphone and Gabapentin that was dispensed in the ER since last week. Ms. Singh reported that her pain was well controlled when taking Hydromorphone and Gabapentin as prescribed. Refills sent to [facility], and delivered at chairside. 4mg of Hydromorphone prescribed to be given at chairside prior to treatment. 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 Pathologic fractures in the right superior pubic ramus and left proximal fibula were noted on recent PET CT scan completed on 8/21/2024. Patient continues to take Eliquis daily (5 mg PO BID) for port r/t thrombus. Patient denies any s/s c/w easily bleeding or bruising.  Ms. Singh requested to speak with Nutrition today. Nutrition team was able to see the patient at the chairside. Appreciate note and recommendations. RTO: with each Cycle of Trodelvy, on Day 1. Appointments reviewed with patient and print out given.   8/28/2024 Patient presents today for repeat blood work and to complete [employer] Testing.  We again discussed the recent results of PTT/INR lab work that was significantly elevated on 8/23/2024. Patient was contacted on 8/26/2024 to initially discuss the elevated lab results. Ms. Singh reported that she was taking the Eliquis 5 mg PO BID as indicated. Upon questioning the patient further regarding the matter, she stated she ran out of the medication and needed a refill. Patient continues to deny as signs of bleeding or bruising. Results of PTT/INR today were WNL, and Eliquis 5mg PO BID was ordered to [facility].   Patient reported taking several supplements to include: Turmeric, Vitamin E, Black Seed Oil and several other non-FDA approved mixed/multi-vitamins up until Day 1 of treatment (8/23/2024). We discussed that these particular supplements may have unknown drug interactions with treatment and can also elevate both PTT & INR. The patient has since discontinued the supplements.   Discussed that [id number] testing is used to identify genomic alterations within the cancer's DNA that may make the patient eligible for a specific therapy/personalized treatment for her cancer. Patient verbalized consent and signed the necessary paperwork. Our office to convey results when they are made available.   Ms. Singh reports her pain is slightly improved since she started re-taking Dilaudid and Gabapentin as prescribed. Referral was made to Palliative Care, and an appointment was scheduled for 18 September 2024.   8/30/2024: day8 of Trodelvy,  She reports significant fatigue and altered taste x 3-4 days after chemo. She was able to function, maintained PO intake, weight stable. She had mild nausea, took Reglan, no vomiting, no diarrhea or mouth sores. No neuropathy, no fevers. Hb low, discussed PRBC tx PET baseline 8/2024 d/w her. She has several pathologic fractures. She doesnt want to go to ER, thinks they arent new. She has been hesitant about RT. Discussed importance of ortho onc consultation and pall RT for bone fractures and pain. No wt beaning until ortho consult.  She is on Eliquis 5 mg PO BID for port-related thrombus. PT/INR was significantly high, normalized on repeat labs  Has R shoulder, pelvis, LE pain somewhat controlled on Dilaudid every 4 hours, gabapentin Q3H. A. Pending palliative care appointment for pain management next month.  Does not have appetite but is taking small portions of meals. lso taking ensure Holding supplements 2 days before and after the chemo: Turmeric, Vitamin E, Black Seed Oil and several other non-FDA approved mixed/multi-vitamins up  [id number] testing sent during last visit Palliative Care, and an appointment was scheduled for 18 September 2024.   9/20/2024 Patient presents today for Cycle 2, Day 8 of Trodelvy.  She reports significant fatigue and altered taste x 3-4 days after chemo. She remains able to function, maintained PO intake, and her weight is stable. She continues to experience mild nausea, but it is improving, and no Reglan was needed after Cycle 2, Day 1. Denies fevers, vomiting, diarrhea, neuropathy or mouth sores.  Discussed low Hgb (8.1 g/dL), and possible need for PRBC transfusion. At this time, the patient denies being symptomatic and wishes to see if her bone marrow can recover on its own. She has been attending numerous appointments (Palliative, Orthopedics, RT) and is becoming overwhelmed.  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 Denies any SOB or palpitations. Ms. Singh has established care with Palliative Care on 18 September 2024 (follow-up scheduled in one week/Telemedicine Visit). Patient reports she enjoyed meeting this provider and was thankful for her recommendations to change the Hydromorphone from Q6 hours to Q4 hours. Additionally, she is now taking Tizanidine 2mg, 1 to 2 tablets every 8 hours for muscle spasms. She reports this medication has made a big difference and reports her overall pain is much more manageable/tolerable. Patient will also continue to take Gabapentin 300 mg TID. She is also now on a stricter bowel regimen taking Colace TID and Miralax daily. She is having BM's every other day.  Ms. Singh also met with Dr. Serena Bustos from Radiation Oncology on 9 September 2024. She reports being much more open to the idea of RT for pain relief and signed informed consent at the initial consultation. It was discussed at that visit a plan of care will be created once the patient established care with Dr. Lauren.  Ms. Singh established care with Dr. Subhash Lauren from Orthopedic Surgery. Patient was offered Right Hip Hemiarthroplasty and surgical stabilization of the Left femur. The patient declined surgical intervention and was instructed to return to the office in 1 month for repeat x-ray imaging.  Patient continues to hold supplements 2 days before and after the chemo: Turmeric, Vitamin E, Black Seed Oil and several other non-FDA approved mixed/multi-vitamins. RTO: with each Cycle of Trodelvy, on Day 8.  [de-identified] : This is a very pleasant 61-year-old female diagnosed with metastatic triple negative breast cancer (metastatic disease to bones and lymph nodes) in July 2023, received Taxol Keytruda 7/202 3-1/2024, POD bones, Carboplatin Gemcitabine and Keytruda 2/202 4-5/2024, then she decided to take a treatment break.  She has been off treatment and reports worsening of palpable metastatic lymph nodes and worsening bone pain. Trodelvy initiated on 8/23/2024.

## 2024-10-16 NOTE — PHYSICAL EXAM
[General Appearance - Alert] : alert [General Appearance - Well Nourished] : well nourished [General Appearance - Well Developed] : well developed [Sclera] : the sclera and conjunctiva were normal [Extraocular Movements] : extraocular movements were intact [Hearing Threshold Finger Rub Not Walsh] : hearing was normal [Neck Appearance] : the appearance of the neck was normal [] : no respiratory distress [No Focal Deficits] : no focal deficits [Oriented To Time, Place, And Person] : oriented to person, place, and time [Impaired Insight] : insight and judgment were intact [Affect] : the affect was normal [Mood] : the mood was normal [FreeTextEntry1] : In mild discomfort

## 2024-10-16 NOTE — HISTORY OF PRESENT ILLNESS
[Home] : at home, [unfilled] , at the time of the visit. [Medical Office: ([facility])___] : at the medical office located in  [Verbal consent obtained from patient] : the patient, [unfilled] [FreeTextEntry1] : 60yo F presents for follow up evaluation, referred by oncology.  Pmhx: Hx of blood clot on Eliquis, metastatic triple negative breast cancer.   Oncology hx: She presented with a right axillary mass and arm swelling in July 2023. She went to an emergency room in [place] and diagnosed with metastatic triple negative breast cancer. She received palliative Taxol and pembrolizumab in July 2023. She underwent a course of palliative radiation therapy to the lower spine, completing treatment in September 2023. Progression of disease was noted in January 2024, so treatment was switched to carboplatin gemcitabine and pembrolizumab in February 2024.  In May 2024, she relocated from [place] to [place]. She has resumed treatment under the care of Dr. Arciniega.  CT angiogram on 7/14/24 showed extensive right axillary and supraclavicular adenopathy measuring up to 4.9 cm. CT abdomen and pelvis showed multiple lytic bone lesions involving L5, the sacrum, pelvic bones and right femoral head.  PET/CT on 8/21/24 showed avid right supraclavicular, subpectoral, and axillary lymph nodes. Multiple lytic and soft tissue avid bone metastases were present associated with pathological fractures of the right superior pubic ramus and left proximal fibula. There were multiple lesions of the right breast and skin.  She started treatment with Trodelvy on 8/23/24.  [facility] visit 9/4: She is having pain from her waist down since March 2024. The pain is most severe in the right hip region and the left lower leg. The pain is worse with weight bearing. She cannot walk without assistance. She is also having pain in her right shoulder, clavicle region when she moves her arm a certain way. She is taking hydromorphone 4mg every 6 hours and gabapentin 300mg tid, with some temporary relief. She will meet with orthopedic surgery, Dr. Lauren, next week.  Ortho visit 9/11: 61F w/ multifocal skeletal metastatic breast ca to bone. The primary areas involved are the right femoral neck/head, R ilium, L posterior acetabulum, L ischium, L femoral shaft, and R scapula. The current PET was not a full body study therefore I have rec'd a bone scan for further assessment and to establish a baseline of her current skeletal disease involvement. She is symptomatic with regards to the right hip which is at risk for developing a pathologic fracture. I would not recommend IMN at this stage given the lytic region in the femoral head. For this reason the best surgical option would be right hip hemiarthroplasty- a procedure which can be done electively or if she breaks at some point in the future. At this stage the main focus is on delivering chemotherapy, a treatment which would be halted if we proceed with surgery. For this reason, I think it's best to hold off on surgery for now while she gets systemic treatment, with the understanding that she will require hemiarthroplasty if she develops a fracture in the future. I have also recommended that she follow up with a general surgeon with regards to the right axillary adenopathy for further evaluation.   ----------------------------------------------------  Pt was referred to supportive oncology for symptom management. She is in a wheelchair, but usually ambulates with a walker.   Pain: Located in R hip, radiates to R knee and anterior thigh. No numbness. Described as severe ache and spasms. Currently at 7/10. Dilaudid brings it down to 7/10. Last taken around 1pm today. Following a schedule with Dilaudid 4mg, taking at 1am, 7am, 1pm, 7pm. Relief lasts about 4 hours. The last 5 days, pain has significantly worsened. Heating pads provide some relief. The pain never goes below 7/10, and at this level she is unable to carry out ADLs, interact with family or socialize.   Also taking Gabapentin 300mg TID.   Constipation/Diarrhea: Taking Senna and Miralax. Had BM yesterday.  Nausea/Vomiting: Denies Appetite/Weight changes: Poor appetite due to pain.  Sleep: Poor sleep, laying in bed a lot, waking up due to pain. Mood: Difficult to express emotions due to the pain, remains quiet   Med update: reconciled  Social: Lives with mom (Kathryn) and dad (Henrik Chisholm), daughter (Irina Singh), and son. Moved from [place] 3 months ago. Worked as a nurse in [place]   Advance Directives / Decision Makers: Father and daughter help with medical decisions    [facility] Ref#: [id number]

## 2024-10-16 NOTE — DATA REVIEWED
[FreeTextEntry1] : EXAM: [id number] - PETCT [facility] ONC FDG SUBS  - ORDERED BY: GREGOR ARCINIEGA   PROCEDURE DATE:  08/21/2024    INTERPRETATION:  CLINICAL INFORMATION: Right breast cancer, metastatic. Evaluate extent of disease.  TREATMENT STRATEGY EVALUATION: Subsequent AREA IMAGED: Skull base-to-thigh FASTING BLOOD SUGAR: 84 mg/dl RADIOPHARMACEUTICAL: 10.69 mCi F-18 FDG, I.V. I.V. SITE: hand left UPTAKE PERIOD: 56 min SCANNER: [employer] Gen2 ORAL CONTRAST: Omnipaque 300 PHARMACOLOGIC INTERVENTION: None.  TECHNIQUE: Following intravenous injection of above radiopharmaceutical and uptake period, PET/CT was performed. CT protocol was optimized for PET attenuation correction and anatomic localization and was not designed to produce and cannot replace state-of-the-art diagnostic CT images with specific imaging protocols for different body parts and indications. Images were reconstructed and reviewed in axial, coronal and sagittal views and three-dimensional MIP.  The standardized uptake values (SUV) are normalized to patient body weight and indicate the highest activity concentration (SUVmax) in a given site. All image numbers refer to axial image number.  COMPARISON:  None.  OTHER STUDIES USED FOR CORRELATION: CT 7/14/2024  FINDINGS:  HEAD/NECK: Physiologic FDG activity in visualized brain, head, and neck.  THORAX: Low-level FDG uptake in asymmetrical soft tissue in the right breast and cutaneous tissues. FDG avid right supraclavicular, right axillary and right subpectoral lymphadenopathy. Index nodes: Right supraclavicular, 2.5 x 1.9 cm, SUV 17 (image 59). Largest lymph node in the right axilla, 5.2 x 5.0 cm, SUV 16 (image 86).  Left Port-A-Cath with tip in the superior vena cava focal FDG uptake along the course of the catheter likely due to focal retention of radiotracer within the catheter.  LUNGS: No abnormal FDG activity. No nodule.  PLEURA/PERICARDIUM: No abnormal FDG activity. No effusion.  HEPATOBILIARY/PANCREAS: Physiologic FDG activity.  For reference, normal liver demonstrates SUV mean 2.3. Cholecystectomy.  SPLEEN: Physiologic FDG activity. Normal in size.  ADRENAL GLANDS: No abnormal FDG activity. No nodule.  KIDNEYS/URINARY BLADDER: Physiologic excreted FDG activity. Cystocele.  REPRODUCTIVE ORGANS: No abnormal FDG activity.  ABDOMINOPELVIC LYMPH NODES/RETROPERITONEUM: A few FDG avid lymph nodes are evident in the pelvis. For example, a right inguinal lymph node measures 1.6 x 1.2 cm, SUV 11 (image 253).  ESOPHAGUS/STOMACH/BOWEL/PERITONEUM/MESENTERY: No abnormal FDG activity. Bowel activity appears physiological.  VESSELS: Unremarkable.  BONES/SOFT TISSUES: Multiple FDG avid bone lesions, some of which are large, for example in the right scapula, right iliac, left iliac, left ischium, right femoral head, left distal femoral shaft, and left fibula. Index lesions: Large right scapular destructive lytic and soft tissue lesion, SUV 18 (image 82). Large destructive and soft tissue lesion in the right iliac, SUV 17 (image 224). Lytic lesion in the right femoral head, SUV 12 (image 242). FDG avid lesion of the right superior pubic ramus also shows fracture. FDG avid lesion associated with the left proximal fibula also shows fracture.   Radiopharmaceutical contamination evident posterior to the pelvis. Mild FDG uptake and soft tissue stranding in the muscles of the lower back and pelvis may relate to recent trauma.  IMPRESSION:  1. Multiple FDG avid right supraclavicular, subpectoral and axillary lymph nodes, consistent with metastases. FDG avid pelvic lymphadenopathy is also evident.  2. Multiple destructive lytic and soft tissue FDG avid bone metastases, several of which are large. Pathologic fractures in the right superior pubic ramus and left proximal fibula.  3. Mildly asymmetrical FDG uptake in the right breast and skin.  Report emailed via secure email to Dr. Gregor Arciniega at time of interpretation.  --- End of Report ---

## 2024-10-16 NOTE — DATA REVIEWED
[FreeTextEntry1] : EXAM: [id number] - PETCT [facility] ONC FDG SUBS  - ORDERED BY: GREGOR ARCINIEGA   PROCEDURE DATE:  08/21/2024    INTERPRETATION:  CLINICAL INFORMATION: Right breast cancer, metastatic. Evaluate extent of disease.  TREATMENT STRATEGY EVALUATION: Subsequent AREA IMAGED: Skull base-to-thigh FASTING BLOOD SUGAR: 84 mg/dl RADIOPHARMACEUTICAL: 10.69 mCi F-18 FDG, I.V. I.V. SITE: hand left UPTAKE PERIOD: 56 min SCANNER: [employer] Gen2 ORAL CONTRAST: Omnipaque 300 PHARMACOLOGIC INTERVENTION: None.  TECHNIQUE: Following intravenous injection of above radiopharmaceutical and uptake period, PET/CT was performed. CT protocol was optimized for PET attenuation correction and anatomic localization and was not designed to produce and cannot replace state-of-the-art diagnostic CT images with specific imaging protocols for different body parts and indications. Images were reconstructed and reviewed in axial, coronal and sagittal views and three-dimensional MIP.  The standardized uptake values (SUV) are normalized to patient body weight and indicate the highest activity concentration (SUVmax) in a given site. All image numbers refer to axial image number.  COMPARISON:  None.  OTHER STUDIES USED FOR CORRELATION: CT 7/14/2024  FINDINGS:  HEAD/NECK: Physiologic FDG activity in visualized brain, head, and neck.  THORAX: Low-level FDG uptake in asymmetrical soft tissue in the right breast and cutaneous tissues. FDG avid right supraclavicular, right axillary and right subpectoral lymphadenopathy. Index nodes: Right supraclavicular, 2.5 x 1.9 cm, SUV 17 (image 59). Largest lymph node in the right axilla, 5.2 x 5.0 cm, SUV 16 (image 86).  Left Port-A-Cath with tip in the superior vena cava focal FDG uptake along the course of the catheter likely due to focal retention of radiotracer within the catheter.  LUNGS: No abnormal FDG activity. No nodule.  PLEURA/PERICARDIUM: No abnormal FDG activity. No effusion.  HEPATOBILIARY/PANCREAS: Physiologic FDG activity.  For reference, normal liver demonstrates SUV mean 2.3. Cholecystectomy.  SPLEEN: Physiologic FDG activity. Normal in size.  ADRENAL GLANDS: No abnormal FDG activity. No nodule.  KIDNEYS/URINARY BLADDER: Physiologic excreted FDG activity. Cystocele.  REPRODUCTIVE ORGANS: No abnormal FDG activity.  ABDOMINOPELVIC LYMPH NODES/RETROPERITONEUM: A few FDG avid lymph nodes are evident in the pelvis. For example, a right inguinal lymph node measures 1.6 x 1.2 cm, SUV 11 (image 253).  ESOPHAGUS/STOMACH/BOWEL/PERITONEUM/MESENTERY: No abnormal FDG activity. Bowel activity appears physiological.  VESSELS: Unremarkable.  BONES/SOFT TISSUES: Multiple FDG avid bone lesions, some of which are large, for example in the right scapula, right iliac, left iliac, left ischium, right femoral head, left distal femoral shaft, and left fibula. Index lesions: Large right scapular destructive lytic and soft tissue lesion, SUV 18 (image 82). Large destructive and soft tissue lesion in the right iliac, SUV 17 (image 224). Lytic lesion in the right femoral head, SUV 12 (image 242). FDG avid lesion of the right superior pubic ramus also shows fracture. FDG avid lesion associated with the left proximal fibula also shows fracture.   Radiopharmaceutical contamination evident posterior to the pelvis. Mild FDG uptake and soft tissue stranding in the muscles of the lower back and pelvis may relate to recent trauma.  IMPRESSION:  1. Multiple FDG avid right supraclavicular, subpectoral and axillary lymph nodes, consistent with metastases. FDG avid pelvic lymphadenopathy is also evident.  2. Multiple destructive lytic and soft tissue FDG avid bone metastases, several of which are large. Pathologic fractures in the right superior pubic ramus and left proximal fibula.  3. Mildly asymmetrical FDG uptake in the right breast and skin.  Report emailed via secure email to Dr. Gregor Arciniega at time of interpretation.  --- End of Report ---

## 2024-10-16 NOTE — PHYSICAL EXAM
[General Appearance - Alert] : alert [General Appearance - Well Developed] : well developed [General Appearance - Well Nourished] : well nourished [Sclera] : the sclera and conjunctiva were normal [Extraocular Movements] : extraocular movements were intact [Hearing Threshold Finger Rub Not [place]] : hearing was normal [Neck Appearance] : the appearance of the neck was normal [] : no respiratory distress [No Focal Deficits] : no focal deficits [Oriented To Time, Place, And Person] : oriented to person, place, and time [Impaired Insight] : insight and judgment were intact [Affect] : the affect was normal [Mood] : the mood was normal [FreeTextEntry1] : In mild discomfort

## 2024-10-16 NOTE — ASSESSMENT
[FreeTextEntry1] : 60yo F with:  # Metastatic triple negative breast cancer - Primary areas involved are the right femoral neck/head, R ilium, L posterior acetabulum, L ischium, L femoral shaft, and R scapula - Started treatment with Trodelvy on 8/23/24 - Had [employer] appt today for mapping, will plan to start radiation but does not have schedule yet  # Pain - Bone pain, and muscle spasm pain - Currently taking dilaudid 4mg every 6 hours ATC with relief down to 7/10 pain - Unable to carry out ADLs, lowers her appetite and impairs quality of life - Increase dilaudid to 6mg every 4 hours as needed - Will hold off on starting long acting pain medication at this time given that she will start radiation and symptoms may change - Start tizanidine 2mg, 1 to 2 tablets every 8 hours as needed for muscle spasms  -  Counseled on importance of maintaining bowel regularity in light of regular opioid use.   # B/l lower extremity Neuropathy  - Relief with Gabapentin 300mg TID - Continue current regimen  # Constipation - Continue bowel regimen - Recommend taking Senna nightly  # Encounter for Palliative Care - Explained the role of palliative care in enhancing quality of life in the setting of serious illness. Emotional support provided.     Follow up in 1 week. Instructed to call office with any questions or concerns.

## 2024-10-16 NOTE — ASSESSMENT
[FreeTextEntry1] : 62yo F with:  # Metastatic triple negative breast cancer - Primary areas involved are the right femoral neck/head, R ilium, L posterior acetabulum, L ischium, L femoral shaft, and R scapula - Started treatment with Trodelvy on 8/23/24 - Had [employer] appt today for mapping, will plan to start radiation but does not have schedule yet  # Pain - Bone pain, and muscle spasm pain - Currently taking dilaudid 4mg every 6 hours ATC with relief down to 7/10 pain - Unable to carry out ADLs, lowers her appetite and impairs quality of life - Increase dilaudid to 6mg every 4 hours as needed - Will hold off on starting long acting pain medication at this time given that she will start radiation and symptoms may change - Start tizanidine 2mg, 1 to 2 tablets every 8 hours as needed for muscle spasms  -  Counseled on importance of maintaining bowel regularity in light of regular opioid use.   # B/l lower extremity Neuropathy  - Relief with Gabapentin 300mg TID - Continue current regimen  # Constipation - Continue bowel regimen - Recommend taking Senna nightly  # Encounter for Palliative Care - Explained the role of palliative care in enhancing quality of life in the setting of serious illness. Emotional support provided.     Follow up in 1 week. Instructed to call office with any questions or concerns.

## 2024-10-16 NOTE — PHYSICAL EXAM
[General Appearance - Alert] : alert [General Appearance - Well Nourished] : well nourished [General Appearance - Well Developed] : well developed [Sclera] : the sclera and conjunctiva were normal [Extraocular Movements] : extraocular movements were intact [Hearing Threshold Finger Rub Not [place]] : hearing was normal [Neck Appearance] : the appearance of the neck was normal [] : no respiratory distress [No Focal Deficits] : no focal deficits [Oriented To Time, Place, And Person] : oriented to person, place, and time [Impaired Insight] : insight and judgment were intact [Affect] : the affect was normal [Mood] : the mood was normal [FreeTextEntry1] : In mild discomfort

## 2024-10-16 NOTE — HISTORY OF PRESENT ILLNESS
[Home] : at home, [unfilled] , at the time of the visit. [Medical Office: ([facility])___] : at the medical office located in  [Verbal consent obtained from patient] : the patient, [unfilled] [FreeTextEntry1] : 62yo F presents for follow up evaluation, referred by oncology.  Pmhx: Hx of blood clot on Eliquis, metastatic triple negative breast cancer.   Oncology hx: She presented with a right axillary mass and arm swelling in July 2023. She went to an emergency room in [place] and diagnosed with metastatic triple negative breast cancer. She received palliative Taxol and pembrolizumab in July 2023. She underwent a course of palliative radiation therapy to the lower spine, completing treatment in September 2023. Progression of disease was noted in January 2024, so treatment was switched to carboplatin gemcitabine and pembrolizumab in February 2024.  In May 2024, she relocated from [place] to [place]. She has resumed treatment under the care of Dr. Arciniega.  CT angiogram on 7/14/24 showed extensive right axillary and supraclavicular adenopathy measuring up to 4.9 cm. CT abdomen and pelvis showed multiple lytic bone lesions involving L5, the sacrum, pelvic bones and right femoral head.  PET/CT on 8/21/24 showed avid right supraclavicular, subpectoral, and axillary lymph nodes. Multiple lytic and soft tissue avid bone metastases were present associated with pathological fractures of the right superior pubic ramus and left proximal fibula. There were multiple lesions of the right breast and skin.  She started treatment with Trodelvy on 8/23/24.  [facility] visit 9/4: She is having pain from her waist down since March 2024. The pain is most severe in the right hip region and the left lower leg. The pain is worse with weight bearing. She cannot walk without assistance. She is also having pain in her right shoulder, clavicle region when she moves her arm a certain way. She is taking hydromorphone 4mg every 6 hours and gabapentin 300mg tid, with some temporary relief. She will meet with orthopedic surgery, Dr. Lauren, next week.  Ortho visit 9/11: 61F w/ multifocal skeletal metastatic breast ca to bone. The primary areas involved are the right femoral neck/head, R ilium, L posterior acetabulum, L ischium, L femoral shaft, and R scapula. The current PET was not a full body study therefore I have rec'd a bone scan for further assessment and to establish a baseline of her current skeletal disease involvement. She is symptomatic with regards to the right hip which is at risk for developing a pathologic fracture. I would not recommend IMN at this stage given the lytic region in the femoral head. For this reason the best surgical option would be right hip hemiarthroplasty- a procedure which can be done electively or if she breaks at some point in the future. At this stage the main focus is on delivering chemotherapy, a treatment which would be halted if we proceed with surgery. For this reason, I think it's best to hold off on surgery for now while she gets systemic treatment, with the understanding that she will require hemiarthroplasty if she develops a fracture in the future. I have also recommended that she follow up with a general surgeon with regards to the right axillary adenopathy for further evaluation.   ----------------------------------------------------  Pt was referred to supportive oncology for symptom management. She is in a wheelchair, but usually ambulates with a walker.   Pain: Located in R hip, radiates to R knee and anterior thigh. No numbness. Described as severe ache and spasms. Currently at 7/10. Dilaudid brings it down to 7/10. Last taken around 1pm today. Following a schedule with Dilaudid 4mg, taking at 1am, 7am, 1pm, 7pm. Relief lasts about 4 hours. The last 5 days, pain has significantly worsened. Heating pads provide some relief. The pain never goes below 7/10, and at this level she is unable to carry out ADLs, interact with family or socialize.   Also taking Gabapentin 300mg TID.   Constipation/Diarrhea: Taking Senna and Miralax. Had BM yesterday.  Nausea/Vomiting: Denies Appetite/Weight changes: Poor appetite due to pain.  Sleep: Poor sleep, laying in bed a lot, waking up due to pain. Mood: Difficult to express emotions due to the pain, remains quiet   Med update: reconciled  Social: Lives with mom (Kathryn) and dad (Henrik Chisholm), daughter (Irina Singh), and son. Moved from [place] 3 months ago. Worked as a nurse in [place]   Advance Directives / Decision Makers: Father and daughter help with medical decisions    [employer] Ref#: [id number]

## 2024-10-16 NOTE — DATA REVIEWED
[FreeTextEntry1] : EXAM: [id number] - PETCT [facility] ONC FDG SUBS  - ORDERED BY: GREGOR ARCINIEGA   PROCEDURE DATE:  08/21/2024    INTERPRETATION:  CLINICAL INFORMATION: Right breast cancer, metastatic. Evaluate extent of disease.  TREATMENT STRATEGY EVALUATION: Subsequent AREA IMAGED: Skull base-to-thigh FASTING BLOOD SUGAR: 84 mg/dl RADIOPHARMACEUTICAL: 10.69 mCi F-18 FDG, I.V. I.V. SITE: hand left UPTAKE PERIOD: 56 min SCANNER: Idooble Gen2 ORAL CONTRAST: Omnipaque 300 PHARMACOLOGIC INTERVENTION: None.  TECHNIQUE: Following intravenous injection of above radiopharmaceutical and uptake period, PET/CT was performed. CT protocol was optimized for PET attenuation correction and anatomic localization and was not designed to produce and cannot replace state-of-the-art diagnostic CT images with specific imaging protocols for different body parts and indications. Images were reconstructed and reviewed in axial, coronal and sagittal views and three-dimensional MIP.  The standardized uptake values (SUV) are normalized to patient body weight and indicate the highest activity concentration (SUVmax) in a given site. All image numbers refer to axial image number.  COMPARISON:  None.  OTHER STUDIES USED FOR CORRELATION: CT 7/14/2024  FINDINGS:  HEAD/NECK: Physiologic FDG activity in visualized brain, head, and neck.  THORAX: Low-level FDG uptake in asymmetrical soft tissue in the right breast and cutaneous tissues. FDG avid right supraclavicular, right axillary and right subpectoral lymphadenopathy. Index nodes: Right supraclavicular, 2.5 x 1.9 cm, SUV 17 (image 59). Largest lymph node in the right axilla, 5.2 x 5.0 cm, SUV 16 (image 86).  Left Port-A-Cath with tip in the superior vena cava focal FDG uptake along the course of the catheter likely due to focal retention of radiotracer within the catheter.  LUNGS: No abnormal FDG activity. No nodule.  PLEURA/PERICARDIUM: No abnormal FDG activity. No effusion.  HEPATOBILIARY/PANCREAS: Physiologic FDG activity.  For reference, normal liver demonstrates SUV mean 2.3. Cholecystectomy.  SPLEEN: Physiologic FDG activity. Normal in size.  ADRENAL GLANDS: No abnormal FDG activity. No nodule.  KIDNEYS/URINARY BLADDER: Physiologic excreted FDG activity. Cystocele.  REPRODUCTIVE ORGANS: No abnormal FDG activity.  ABDOMINOPELVIC LYMPH NODES/RETROPERITONEUM: A few FDG avid lymph nodes are evident in the pelvis. For example, a right inguinal lymph node measures 1.6 x 1.2 cm, SUV 11 (image 253).  ESOPHAGUS/STOMACH/BOWEL/PERITONEUM/MESENTERY: No abnormal FDG activity. Bowel activity appears physiological.  VESSELS: Unremarkable.  BONES/SOFT TISSUES: Multiple FDG avid bone lesions, some of which are large, for example in the right scapula, right iliac, left iliac, left ischium, right femoral head, left distal femoral shaft, and left fibula. Index lesions: Large right scapular destructive lytic and soft tissue lesion, SUV 18 (image 82). Large destructive and soft tissue lesion in the right iliac, SUV 17 (image 224). Lytic lesion in the right femoral head, SUV 12 (image 242). FDG avid lesion of the right superior pubic ramus also shows fracture. FDG avid lesion associated with the left proximal fibula also shows fracture.   Radiopharmaceutical contamination evident posterior to the pelvis. Mild FDG uptake and soft tissue stranding in the muscles of the lower back and pelvis may relate to recent trauma.  IMPRESSION:  1. Multiple FDG avid right supraclavicular, subpectoral and axillary lymph nodes, consistent with metastases. FDG avid pelvic lymphadenopathy is also evident.  2. Multiple destructive lytic and soft tissue FDG avid bone metastases, several of which are large. Pathologic fractures in the right superior pubic ramus and left proximal fibula.  3. Mildly asymmetrical FDG uptake in the right breast and skin.  Report emailed via secure email to Dr. Gregor Arciniega at time of interpretation.  --- End of Report ---

## 2024-10-16 NOTE — HISTORY OF PRESENT ILLNESS
[Home] : at home, [unfilled] , at the time of the visit. [Medical Office: ([facility])___] : at the medical office located in  [Verbal consent obtained from patient] : the patient, [unfilled] [FreeTextEntry1] : 62yo F presents for follow up evaluation, referred by oncology.  Pmhx: Hx of blood clot on Eliquis, metastatic triple negative breast cancer.   Oncology hx: She presented with a right axillary mass and arm swelling in July 2023. She went to an emergency room in [place] and diagnosed with metastatic triple negative breast cancer. She received palliative Taxol and pembrolizumab in July 2023. She underwent a course of palliative radiation therapy to the lower spine, completing treatment in September 2023. Progression of disease was noted in January 2024, so treatment was switched to carboplatin gemcitabine and pembrolizumab in February 2024.  In May 2024, she relocated from [place] to [place]. She has resumed treatment under the care of Dr. Arciniega.  CT angiogram on 7/14/24 showed extensive right axillary and supraclavicular adenopathy measuring up to 4.9 cm. CT abdomen and pelvis showed multiple lytic bone lesions involving L5, the sacrum, pelvic bones and right femoral head.  PET/CT on 8/21/24 showed avid right supraclavicular, subpectoral, and axillary lymph nodes. Multiple lytic and soft tissue avid bone metastases were present associated with pathological fractures of the right superior pubic ramus and left proximal fibula. There were multiple lesions of the right breast and skin.  She started treatment with Trodelvy on 8/23/24.  [facility] visit 9/4: She is having pain from her waist down since March 2024. The pain is most severe in the right hip region and the left lower leg. The pain is worse with weight bearing. She cannot walk without assistance. She is also having pain in her right shoulder, clavicle region when she moves her arm a certain way. She is taking hydromorphone 4mg every 6 hours and gabapentin 300mg tid, with some temporary relief. She will meet with orthopedic surgery, Dr. Lauren, next week.  Ortho visit 9/11: 61F w/ multifocal skeletal metastatic breast ca to bone. The primary areas involved are the right femoral neck/head, R ilium, L posterior acetabulum, L ischium, L femoral shaft, and R scapula. The current PET was not a full body study therefore I have rec'd a bone scan for further assessment and to establish a baseline of her current skeletal disease involvement. She is symptomatic with regards to the right hip which is at risk for developing a pathologic fracture. I would not recommend IMN at this stage given the lytic region in the femoral head. For this reason the best surgical option would be right hip hemiarthroplasty- a procedure which can be done electively or if she breaks at some point in the future. At this stage the main focus is on delivering chemotherapy, a treatment which would be halted if we proceed with surgery. For this reason, I think it's best to hold off on surgery for now while she gets systemic treatment, with the understanding that she will require hemiarthroplasty if she develops a fracture in the future. I have also recommended that she follow up with a general surgeon with regards to the right axillary adenopathy for further evaluation.   ----------------------------------------------------  Pt was referred to supportive oncology for symptom management. She is in a wheelchair, but usually ambulates with a walker.   Pain: Located in R hip, radiates to R knee and anterior thigh. No numbness. Described as severe ache and spasms. Currently at 7/10. Dilaudid brings it down to 7/10. Last taken around 1pm today. Following a schedule with Dilaudid 4mg, taking at 1am, 7am, 1pm, 7pm. Relief lasts about 4 hours. The last 5 days, pain has significantly worsened. Heating pads provide some relief. The pain never goes below 7/10, and at this level she is unable to carry out ADLs, interact with family or socialize.   Also taking Gabapentin 300mg TID.   Constipation/Diarrhea: Taking Senna and Miralax. Had BM yesterday.  Nausea/Vomiting: Denies Appetite/Weight changes: Poor appetite due to pain.  Sleep: Poor sleep, laying in bed a lot, waking up due to pain. Mood: Difficult to express emotions due to the pain, remains quiet   Med update: reconciled  Social: Lives with mom (Kathryn) and dad (Henrik Chisholm), daughter (Irina Singh), and son. Moved from [place] 3 months ago. Worked as a nurse in [place]   Advance Directives / Decision Makers: Father and daughter help with medical decisions    Istop Ref#: [id number]

## 2024-10-16 NOTE — ASSESSMENT
[FreeTextEntry1] : 62yo F with:  # Metastatic triple negative breast cancer - Primary areas involved are the right femoral neck/head, R ilium, L posterior acetabulum, L ischium, L femoral shaft, and R scapula - Started treatment with Trodelvy on 8/23/24 - Had NPTV appt today for mapping, will plan to start radiation but does not have schedule yet  # Pain - Bone pain, and muscle spasm pain - Currently taking dilaudid 4mg every 6 hours ATC with relief down to 7/10 pain - Unable to carry out ADLs, lowers her appetite and impairs quality of life - Increase dilaudid to 6mg every 4 hours as needed - Will hold off on starting long acting pain medication at this time given that she will start radiation and symptoms may change - Start tizanidine 2mg, 1 to 2 tablets every 8 hours as needed for muscle spasms  -  Counseled on importance of maintaining bowel regularity in light of regular opioid use.   # B/l lower extremity Neuropathy  - Relief with Gabapentin 300mg TID - Continue current regimen  # Constipation - Continue bowel regimen - Recommend taking Senna nightly  # Encounter for Palliative Care - Explained the role of palliative care in enhancing quality of life in the setting of serious illness. Emotional support provided.     Follow up in 1 week. Instructed to call office with any questions or concerns.

## 2024-10-21 NOTE — HISTORY OF PRESENT ILLNESS
[Home] : at home, [unfilled] , at the time of the visit. [Medical Office: ([facility])___] : at the medical office located in  [Verbal consent obtained from patient] : the patient, [unfilled] [FreeTextEntry1] : 62yo F presents for follow up, referred by oncology.  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   Oncology hx: She presented with a right axillary mass and arm swelling in July 2023. She went to an emergency room in [place] and diagnosed with metastatic triple negative breast cancer. She received palliative Taxol and pembrolizumab in July 2023. She underwent a course of palliative radiation therapy to the lower spine, completing treatment in September 2023. Progression of disease was noted in January 2024, so treatment was switched to carboplatin gemcitabine and pembrolizumab in February 2024.  In May 2024, she relocated from [place] to [place]. She has resumed treatment under the care of Dr. Arciniega.  CT angiogram on 7/14/24 showed extensive right axillary and supraclavicular adenopathy measuring up to 4.9 cm. CT abdomen and pelvis showed multiple lytic bone lesions involving L5, the sacrum, pelvic bones and right femoral head.  PET/CT on 8/21/24 showed avid right supraclavicular, subpectoral, and axillary lymph nodes. Multiple lytic and soft tissue avid bone metastases were present associated with pathological fractures of the right superior pubic ramus and left proximal fibula. There were multiple lesions of the right breast and skin.  She started treatment with Trodelvy on 8/23/24.  [facility] visit 9/4: She is having pain from her waist down since March 2024. The pain is most severe in the right hip region and the left lower leg. The pain is worse with weight bearing. She cannot walk without assistance. She is also having pain in her right shoulder, clavicle region when she moves her arm a certain way. She is taking hydromorphone 4mg every 6 hours and gabapentin 300mg tid, with some temporary relief. She will meet with orthopedic surgery, Dr. Lauren, next week.  Ortho visit 9/11: 61F w/ multifocal skeletal metastatic breast ca to bone. The primary areas involved are the right femoral neck/head, R ilium, L posterior acetabulum, L ischium, L femoral shaft, and R scapula. The current PET was not a full body study therefore I have rec'd a bone scan for further assessment and to establish a baseline of her current skeletal disease involvement. She is symptomatic with regards to the right hip which is at risk for developing a pathologic fracture. I would not recommend IMN at this stage given the lytic region in the femoral head. For this reason the best surgical option would be right hip hemiarthroplasty- a procedure which can be done electively or if she breaks at some point in the future. At this stage the main focus is on delivering chemotherapy, a treatment which would be halted if we proceed with surgery. For this reason, I think it's best to hold off on surgery for now while she gets systemic treatment, with the understanding that she will require hemiarthroplasty if she develops a fracture in the future. I have also recommended that she follow up with a general surgeon with regards to the right axillary adenopathy for further evaluation.    Onc 10/11/24: Patient is here now for cycle 3D1. She was admitted at [facility] with intractable pain. she received palliative RT. Pain level is 5-7 out of 10. She is followed by palliative. She is on Dilaudid and morphine. She is able to shuffle slowly and get around. Not able to bear weight.  She states that her appetite and fatigue has improved secondary to transient chemo break. Weight is stable. No neuropathy or fevers. I recommend to complete 2 additional cycles of Trodelvy followed by repeat scans.  She had low hemoglobin which is stable now. she has met with orthopedic oncology and is refusing pending surgical fixation. 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 Denies any SOB or palpitations. ----------------------------------------------------  Pt was initially referred to supportive oncology for symptom management. She is in a wheelchair, but usually ambulates with a walker. She describes pain located in R hip, radiates to R knee and anterior thigh. No numbness. Described as severe ache and spasms. Currently at 7/10. Dilaudid brings it down to 7/10. Last taken around 1pm today. Following a schedule with Dilaudid 4mg, taking at 1am, 7am, 1pm, 7pm. Relief lasts about 4 hours. The last 5 days, pain has significantly worsened. Heating pads provide some relief. The pain never goes below 7/10, and at this level she is unable to carry out ADLs, interact with family or socialize. She is also taking Gabapentin 300mg TID. Using Senna and Miralax. Had BM yesterday. Denies nausea, vomiting. Poor appetite due to pain. Poor sleep, laying in bed a lot and waking up due to pain. Difficult to express emotions due to the pain, remains quiet.  Social: Lives with mom (Kathryn) and dad (Henrik Chisholm), daughter (Irina Singh), and son. Moved from [place] 3 months ago. 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 She is S/p 5 fractions of radiation, ended on 10/2/24. During that admission, she was seen by palliative care team and started on Morphine ER 30mg q8h, and Dilaudid PO 4-6mg q4h PRN. She states her pain seemed to be well controlled up until 2 days ago where the pain in her hip/legs have worsened. She denies any recent trauma or fall. She is taking Dilaudid PO 4mg once in the morning and Tizanidine 2mg as needed, on average once or less per day. When she takes the Dialudid 4mg PO in the morning, she gets 1-2 hrs of relief. She was hesitant to take it any more times per day because she was concerned to mix it with her long-acting morphine. She also continues on Gabapentin 300mg TID. She continues on a bowel regimen with regular BMs. She has no other concerns today. Next chemo is this Friday.  10/21/24 Interval History: Ran out of Morphine ER 10/16. Taking Dilaudid PO 4mg 1.5 tab about 2-3x daily. She states since being off the Morphine ER, she feels she may not need to restart a long acting. She believes the radiation may be helping her pain and she doesn't need a long acting. She takes Claritin after Nulesta injections with relief for about 5 days. She is also taking Tizanidine 2mg about once daily with relief. Admits to constipation, taking Senna sporadically but now taking 3 tab at bedtime.  Istop Ref#: [id number]

## 2024-10-21 NOTE — ASSESSMENT
[FreeTextEntry1] : 60yo F with:  # Metastatic triple negative breast cancer - Primary areas involved are the right femoral neck/head, R ilium, L posterior acetabulum, L ischium, L femoral shaft, and R scapula - S/p radiation while at [facility] - On chemotherapy  # Pain - Ran out of Morphine ER last week but feels pain is controlled without it so will stay off for now - Continue Dilaudid PO 6mg about 2-3x per day - Continue tizanidine 2mg, taking about once tablet daily - Counseled on importance of maintaining bowel regularity in light of regular opioid use.   # B/l lower extremity Neuropathy  - Gabapentin 300mg TID  # Constipation - Has not been regimented with medication - Has chronic constipation - Instructed to take Senna two tab twice daily and Miralax twice daily  # Debility - Pt is physically limited due to malignancy and its sequelae  - Will contact  about home PT referral  # Encounter for Palliative Care - Explained the role of palliative care in enhancing quality of life in the setting of serious illness. Emotional support provided.     Follow up in 2 weeks. Instructed to call office with any questions or concerns.

## 2024-10-21 NOTE — DATA REVIEWED
[FreeTextEntry1] : EXAM: [id number] - PETCT [facility] ONC FDG SUBS  - ORDERED BY: GREGOR ARCINIEGA   PROCEDURE DATE:  08/21/2024    INTERPRETATION:  CLINICAL INFORMATION: Right breast cancer, metastatic. Evaluate extent of disease.  TREATMENT STRATEGY EVALUATION: Subsequent AREA IMAGED: Skull base-to-thigh FASTING BLOOD SUGAR: 84 mg/dl RADIOPHARMACEUTICAL: 10.69 mCi F-18 FDG, I.V. I.V. SITE: hand left UPTAKE PERIOD: 56 min SCANNER: Compario Gen2 ORAL CONTRAST: Omnipaque 300 PHARMACOLOGIC INTERVENTION: None.  TECHNIQUE: Following intravenous injection of above radiopharmaceutical and uptake period, PET/CT was performed. CT protocol was optimized for PET attenuation correction and anatomic localization and was not designed to produce and cannot replace state-of-the-art diagnostic CT images with specific imaging protocols for different body parts and indications. Images were reconstructed and reviewed in axial, coronal and sagittal views and three-dimensional MIP.  The standardized uptake values (SUV) are normalized to patient body weight and indicate the highest activity concentration (SUVmax) in a given site. All image numbers refer to axial image number.  COMPARISON:  None.  OTHER STUDIES USED FOR CORRELATION: CT 7/14/2024  FINDINGS:  HEAD/NECK: Physiologic FDG activity in visualized brain, head, and neck.  THORAX: Low-level FDG uptake in asymmetrical soft tissue in the right breast and cutaneous tissues. FDG avid right supraclavicular, right axillary and right subpectoral lymphadenopathy. Index nodes: Right supraclavicular, 2.5 x 1.9 cm, SUV 17 (image 59). Largest lymph node in the right axilla, 5.2 x 5.0 cm, SUV 16 (image 86).  Left Port-A-Cath with tip in the superior vena cava focal FDG uptake along the course of the catheter likely due to focal retention of radiotracer within the catheter.  LUNGS: No abnormal FDG activity. No nodule.  PLEURA/PERICARDIUM: No abnormal FDG activity. No effusion.  HEPATOBILIARY/PANCREAS: Physiologic FDG activity.  For reference, normal liver demonstrates SUV mean 2.3. Cholecystectomy.  SPLEEN: Physiologic FDG activity. Normal in size.  ADRENAL GLANDS: No abnormal FDG activity. No nodule.  KIDNEYS/URINARY BLADDER: Physiologic excreted FDG activity. Cystocele.  REPRODUCTIVE ORGANS: No abnormal FDG activity.  ABDOMINOPELVIC LYMPH NODES/RETROPERITONEUM: A few FDG avid lymph nodes are evident in the pelvis. For example, a right inguinal lymph node measures 1.6 x 1.2 cm, SUV 11 (image 253).  ESOPHAGUS/STOMACH/BOWEL/PERITONEUM/MESENTERY: No abnormal FDG activity. Bowel activity appears physiological.  VESSELS: Unremarkable.  BONES/SOFT TISSUES: Multiple FDG avid bone lesions, some of which are large, for example in the right scapula, right iliac, left iliac, left ischium, right femoral head, left distal femoral shaft, and left fibula. Index lesions: Large right scapular destructive lytic and soft tissue lesion, SUV 18 (image 82). Large destructive and soft tissue lesion in the right iliac, SUV 17 (image 224). Lytic lesion in the right femoral head, SUV 12 (image 242). FDG avid lesion of the right superior pubic ramus also shows fracture. FDG avid lesion associated with the left proximal fibula also shows fracture.   Radiopharmaceutical contamination evident posterior to the pelvis. Mild FDG uptake and soft tissue stranding in the muscles of the lower back and pelvis may relate to recent trauma.  IMPRESSION:  1. Multiple FDG avid right supraclavicular, subpectoral and axillary lymph nodes, consistent with metastases. FDG avid pelvic lymphadenopathy is also evident.  2. Multiple destructive lytic and soft tissue FDG avid bone metastases, several of which are large. Pathologic fractures in the right superior pubic ramus and left proximal fibula.  3. Mildly asymmetrical FDG uptake in the right breast and skin.  Report emailed via secure email to Dr. Gregor Arciniega at time of interpretation.  --- End of Report ---

## 2024-10-21 NOTE — PHYSICAL EXAM
[General Appearance - Alert] : alert [General Appearance - Well Nourished] : well nourished [General Appearance - Well Developed] : well developed [Sclera] : the sclera and conjunctiva were normal [Extraocular Movements] : extraocular movements were intact [Hearing Threshold Finger Rub Not [place]] : hearing was normal [] : no respiratory distress [No Focal Deficits] : no focal deficits [Oriented To Time, Place, And Person] : oriented to person, place, and time [Impaired Insight] : insight and judgment were intact [Affect] : the affect was normal [Mood] : the mood was normal [FreeTextEntry1] : TTP R thigh and hip. Limited ROM due to discomfort

## 2024-11-01 NOTE — DATA REVIEWED
[FreeTextEntry1] : EXAM: [id number] - [facility]CT [facility] ONC FDG SUBS  - ORDERED BY: GREGOR ARCINIEGA   PROCEDURE DATE:  08/21/2024    INTERPRETATION:  CLINICAL INFORMATION: Right breast cancer, metastatic. Evaluate extent of disease.  TREATMENT STRATEGY EVALUATION: Subsequent AREA IMAGED: Skull base-to-thigh FASTING BLOOD SUGAR: 84 mg/dl RADIOPHARMACEUTICAL: 10.69 mCi F-18 FDG, I.V. I.V. SITE: hand left UPTAKE PERIOD: 56 min SCANNER: HumanAPI Gen2 ORAL CONTRAST: Omnipaque 300 PHARMACOLOGIC INTERVENTION: None.  TECHNIQUE: Following intravenous injection of above radiopharmaceutical and uptake period, PET/CT was performed. CT protocol was optimized for PET attenuation correction and anatomic localization and was not designed to produce and cannot replace state-of-the-art diagnostic CT images with specific imaging protocols for different body parts and indications. Images were reconstructed and reviewed in axial, coronal and sagittal views and three-dimensional MIP.  The standardized uptake values (SUV) are normalized to patient body weight and indicate the highest activity concentration (SUVmax) in a given site. All image numbers refer to axial image number.  COMPARISON:  None.  OTHER STUDIES USED FOR CORRELATION: CT 7/14/2024  FINDINGS:  HEAD/NECK: Physiologic FDG activity in visualized brain, head, and neck.  THORAX: Low-level FDG uptake in asymmetrical soft tissue in the right breast and cutaneous tissues. FDG avid right supraclavicular, right axillary and right subpectoral lymphadenopathy. Index nodes: Right supraclavicular, 2.5 x 1.9 cm, SUV 17 (image 59). Largest lymph node in the right axilla, 5.2 x 5.0 cm, SUV 16 (image 86).  Left Port-A-Cath with tip in the superior vena cava focal FDG uptake along the course of the catheter likely due to focal retention of radiotracer within the catheter.  LUNGS: No abnormal FDG activity. No nodule.  PLEURA/PERICARDIUM: No abnormal FDG activity. No effusion.  HEPATOBILIARY/PANCREAS: Physiologic FDG activity.  For reference, normal liver demonstrates SUV mean 2.3. Cholecystectomy.  SPLEEN: Physiologic FDG activity. Normal in size.  ADRENAL GLANDS: No abnormal FDG activity. No nodule.  KIDNEYS/URINARY BLADDER: Physiologic excreted FDG activity. Cystocele.  REPRODUCTIVE ORGANS: No abnormal FDG activity.  ABDOMINOPELVIC LYMPH NODES/RETROPERITONEUM: A few FDG avid lymph nodes are evident in the pelvis. For example, a right inguinal lymph node measures 1.6 x 1.2 cm, SUV 11 (image 253).  ESOPHAGUS/STOMACH/BOWEL/PERITONEUM/MESENTERY: No abnormal FDG activity. Bowel activity appears physiological.  VESSELS: Unremarkable.  BONES/SOFT TISSUES: Multiple FDG avid bone lesions, some of which are large, for example in the right scapula, right iliac, left iliac, left ischium, right femoral head, left distal femoral shaft, and left fibula. Index lesions: Large right scapular destructive lytic and soft tissue lesion, SUV 18 (image 82). Large destructive and soft tissue lesion in the right iliac, SUV 17 (image 224). Lytic lesion in the right femoral head, SUV 12 (image 242). FDG avid lesion of the right superior pubic ramus also shows fracture. FDG avid lesion associated with the left proximal fibula also shows fracture.   Radiopharmaceutical contamination evident posterior to the pelvis. Mild FDG uptake and soft tissue stranding in the muscles of the lower back and pelvis may relate to recent trauma.  IMPRESSION:  1. Multiple FDG avid right supraclavicular, subpectoral and axillary lymph nodes, consistent with metastases. FDG avid pelvic lymphadenopathy is also evident.  2. Multiple destructive lytic and soft tissue FDG avid bone metastases, several of which are large. Pathologic fractures in the right superior pubic ramus and left proximal fibula.  3. Mildly asymmetrical FDG uptake in the right breast and skin.  Report emailed via secure email to Dr. Gregor Arciniega at time of interpretation.  --- End of Report ---

## 2024-11-01 NOTE — PHYSICAL EXAM
[General Appearance - Alert] : alert [General Appearance - Well Nourished] : well nourished [General Appearance - Well Developed] : well developed [Sclera] : the sclera and conjunctiva were normal [Extraocular Movements] : extraocular movements were intact [Hearing Threshold Finger Rub Not [place]] : hearing was normal [] : no respiratory distress [No Focal Deficits] : no focal deficits [Oriented To Time, Place, And Person] : oriented to person, place, and time [Affect] : the affect was normal [Impaired Insight] : insight and judgment were intact [Mood] : the mood was normal [Outer Ear] : the ears and nose were normal in appearance [Respiration, Rhythm And Depth] : normal respiratory rhythm and effort [Auscultation Breath Sounds / Voice Sounds] : lungs were clear to auscultation bilaterally [Heart Rate And Rhythm] : heart rate was normal and rhythm regular [Heart Sounds] : normal S1 and S2 [Full Pulse] : the pedal pulses are present [Bowel Sounds] : normal bowel sounds [Abdomen Soft] : soft [Abdomen Tenderness] : non-tender [FreeTextEntry1] : TTP R thigh and hip. Limited ROM due to discomfort

## 2024-11-01 NOTE — END OF VISIT
[Time Spent: ___ minutes] : I have spent [unfilled] minutes of time on the encounter which excludes teaching and separately reported services. [FreeTextEntry3] : Agree with NP assessment and plan as outlined above.

## 2024-11-01 NOTE — ASSESSMENT
[FreeTextEntry1] : 62yo F with:  # Metastatic triple negative breast cancer - Primary areas involved are the right femoral neck/head, R ilium, L posterior acetabulum, L ischium, L femoral shaft, and R scapula - S/p radiation while at [facility] - On chemotherapy  # Pain - Restart MSER 30mg BID- sent to [facility] - Continue Dilaudid PO 6mg - using 3-4 times daily - Continue tizanidine 2mg, taking about once to twice tablet daily - Counseled on importance of maintaining bowel regularity in light of regular opioid use.   # B/l lower extremity Neuropathy  - Gabapentin 300mg TID  # Constipation - Has not been regimented with medication - Has chronic constipation - Instructed to take Senna two tab twice daily and Miralax twice daily  # Debility - Pt is physically limited due to malignancy and its sequelae  - Getting home care services  #LE Edema - DESTINEY LE Doppler US ordered R/O DVT -Remains on Eliquis 5mg daily for hx clot to port  # Encounter for Palliative Care - Explained the role of palliative care in enhancing quality of life in the setting of serious illness. Emotional support provided.     Follow up in 2 weeks. Instructed to call office with any questions or concerns.

## 2024-11-01 NOTE — HISTORY OF PRESENT ILLNESS
[FreeTextEntry1] : 60yo F presents for follow up, referred by oncology.  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   Oncology hx: She presented with a right axillary mass and arm swelling in July 2023. She went to an emergency room in [place] and diagnosed with metastatic triple negative breast cancer. She received palliative Taxol and pembrolizumab in July 2023. She underwent a course of palliative radiation therapy to the lower spine, completing treatment in September 2023. Progression of disease was noted in January 2024, so treatment was switched to carboplatin gemcitabine and pembrolizumab in February 2024.  In May 2024, she relocated from [place] to [place]. She has resumed treatment under the care of Dr. Arciniega.  CT angiogram on 7/14/24 showed extensive right axillary and supraclavicular adenopathy measuring up to 4.9 cm. CT abdomen and pelvis showed multiple lytic bone lesions involving L5, the sacrum, pelvic bones and right femoral head.  PET/CT on 8/21/24 showed avid right supraclavicular, subpectoral, and axillary lymph nodes. Multiple lytic and soft tissue avid bone metastases were present associated with pathological fractures of the right superior pubic ramus and left proximal fibula. There were multiple lesions of the right breast and skin.  She started treatment with Trodelvy on 8/23/24.  [facility] visit 9/4: She is having pain from her waist down since March 2024. The pain is most severe in the right hip region and the left lower leg. The pain is worse with weight bearing. She cannot walk without assistance. She is also having pain in her right shoulder, clavicle region when she moves her arm a certain way. She is taking hydromorphone 4mg every 6 hours and gabapentin 300mg tid, with some temporary relief. She will meet with orthopedic surgery, Dr. Lauren, next week.  Ortho visit 9/11: 61F w/ multifocal skeletal metastatic breast ca to bone. The primary areas involved are the right femoral neck/head, R ilium, L posterior acetabulum, L ischium, L femoral shaft, and R scapula. The current PET was not a full body study therefore I have rec'd a bone scan for further assessment and to establish a baseline of her current skeletal disease involvement. She is symptomatic with regards to the right hip which is at risk for developing a pathologic fracture. I would not recommend IMN at this stage given the lytic region in the femoral head. For this reason the best surgical option would be right hip hemiarthroplasty- a procedure which can be done electively or if she breaks at some point in the future. At this stage the main focus is on delivering chemotherapy, a treatment which would be halted if we proceed with surgery. For this reason, I think it's best to hold off on surgery for now while she gets systemic treatment, with the understanding that she will require hemiarthroplasty if she develops a fracture in the future. I have also recommended that she follow up with a general surgeon with regards to the right axillary adenopathy for further evaluation.    Onc 10/11/24: Patient is here now for cycle 3D1. She was admitted at [facility] with intractable pain. she received palliative RT. Pain level is 5-7 out of 10. She is followed by palliative. She is on Dilaudid and morphine. She is able to shuffle slowly and get around. Not able to bear weight.  She states that her appetite and fatigue has improved secondary to transient chemo break. Weight is stable. No neuropathy or fevers. I recommend to complete 2 additional cycles of Trodelvy followed by repeat scans.  She had low hemoglobin which is stable now. she has met with orthopedic oncology and is refusing pending surgical fixation. 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 Denies any SOB or palpitations. ----------------------------------------------------  Pt was initially referred to supportive oncology for symptom management. She is in a wheelchair, but usually ambulates with a walker. She describes pain located in R hip, radiates to R knee and anterior thigh. No numbness. Described as severe ache and spasms. Currently at 7/10. Dilaudid brings it down to 7/10. Last taken around 1pm today. Following a schedule with Dilaudid 4mg, taking at 1am, 7am, 1pm, 7pm. Relief lasts about 4 hours. The last 5 days, pain has significantly worsened. Heating pads provide some relief. The pain never goes below 7/10, and at this level she is unable to carry out ADLs, interact with family or socialize. She is also taking Gabapentin 300mg TID. Using Senna and Miralax. Had BM yesterday. Denies nausea, vomiting. Poor appetite due to pain. Poor sleep, laying in bed a lot and waking up due to pain. Difficult to express emotions due to the pain, remains quiet.  Social: Lives with mom (Kathryn) and dad (Henrik Chisholm), daughter (Irina Singh), and son. Moved from [place] 3 months ago. 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 She is S/p 5 fractions of radiation, ended on 10/2/24. During that admission, she was seen by palliative care team and started on Morphine ER 30mg q8h, and Dilaudid PO 4-6mg q4h PRN. She states her pain seemed to be well controlled up until 2 days ago where the pain in her hip/legs have worsened. She denies any recent trauma or fall. She is taking Dilaudid PO 4mg once in the morning and Tizanidine 2mg as needed, on average once or less per day. When she takes the Dialudid 4mg PO in the morning, she gets 1-2 hrs of relief. She was hesitant to take it any more times per day because she was concerned to mix it with her long-acting morphine. She also continues on Gabapentin 300mg TID. She continues on a bowel regimen with regular BMs. She has no other concerns today. Next chemo is this Friday.  10/21/24 Interval History: Ran out of Morphine ER 10/16. Taking Dilaudid PO 4mg 1.5 tab about 2-3x daily. She states since being off the Morphine ER, she feels she may not need to restart a long acting. She believes the radiation may be helping her pain and she doesn't need a long acting. She takes Claritin after Nulesta injections with relief for about 5 days. She is also taking Tizanidine 2mg about once daily with relief. Admits to constipation, taking Senna sporadically but now taking 3 tab at bedtime.  11/1/2024 Interval History: Since d/c the MSER she reports pain has risen, stating it rises to 10/10. She has been utilizing hydromorphone 6mg three to four times daily. She utilizes Tizanidine 2mg once to twice daily with limited relief. She states the pain lessens if she lays still. She reports pain to sacral stage III continues to offset weight, apply barrier creams and dressings. Pain from the pressure injury often wakes her at night. She is eating two meals daily, denies N/V/D. Today she presents chairside with swelling to RLE, + pedal pulses. She states she is compliant with Eliquis regimen.    [facility]stop Ref#: [id number]

## 2024-11-01 NOTE — ADDENDUM
[FreeTextEntry1] : I, Dr. Lind, personally performed the evaluation and management (E/M) services for this established patient who presents today with a new problem/exacerbation of an existing condition.  That E/M includes conducting the examination, assessing all new/exacerbated conditions, and establishing a new plan of care.  Today, Bessy Loya NP, was here to observe my evaluation and management services for this new problem/exacerbated condition to be followed going forward.

## 2024-11-08 NOTE — HISTORY OF PRESENT ILLNESS
[Disease: _____________________] : Disease: [unfilled] [de-identified] :  This is a very pleasant 61-year-old female presented today for an evaluation for metastatic triple negative breast cancer.  Patient reports she noticed a large lump under her right arm and lymphedema in July 2023.  She went to the emergency room in [place] and workup showed metastatic triple negative breast cancer.  She underwent a right lymph node biopsy which was reportedly triple negative.  Biopsy report is not available for review.  She started palliative intent Taxol and pembrolizumab July 2023.  A PET/CT September 2023 showed mixed response, and she continued on the same regimen.  A PET/CT in January 2024 showed marked progression of disease in the bones.  Treatment was changed to carboplatin gemcitabine and pembrolizumab in February 2024.  She reportedly developed neutropenia and needed Neulasta for subsequent cycles.  PET/CT May 2024 showed stable disease.  Her last chemotherapy treatment was on 5/17/2024.  She decided to move to [place] close to family.  She has not had any treatment since then.  She reports she wanted to take a treatment break and has been taking holistic treatments.  She was disappointed that  neck and armpit masses grew during treatment break.  She was seen at [facility] last month and is referred here for continuation of care.  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  She is fasting 16 to 24 hours and has lost 40 pounds in the last 4 months.  She has significant right hip and left leg pain.  She is taking Dilaudid as needed and gabapentin.  Currently pain is 5 out of 10.  She walks with a cane and pain gets worse on walking after 1-2 blocks.  She had radiation to spine last year and developed significant toxicity and does not want to take radiation anymore. She is currently getting home PT. she reports skin discoloration from radiation and Neulasta.  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  Port has not been placed in 3 months.  She has double-lumen port.  She reports BRCA testing was done but she is not aware of results.  She lives with her parents and her daughter is supporting her.  8/23/2024 Patient presents for Trodelvy Cycle 1, Day 1. She is accompanied by her daughter. All questions asked/answered prior to initiation of treatment.  Patient underwent Port Study on 8/2124 at [facility].  Procedure Findings demonstrated a left-sided double port with tip of catheter in the SVC. The left port was accessed under sterile conditions with blood return and appropriate flushing was demonstrated. Hand injection of contrast demonstrates filling of port well and lumen without evidence of fibrin sheath. Unfortunately, multiple attempts were made with right port however it was unsuccessful. The plan per the Interventional Radiologist included: 1. Left port may be accessed and utilized for her upcoming treatment of chemotherapy. 2. Suggestion of possible replacement with a single lumen Mediport. Ms. Singh c/o of significant pain 10/10 on the pain scale. Reports she ran out of Hydromorphone and Gabapentin that was dispensed in the ER since last week. Ms. Singh reported that her pain was well controlled when taking Hydromorphone and Gabapentin as prescribed. Refills sent to [facility], and delivered at chairside. 4mg of Hydromorphone prescribed to be given at chairside prior to treatment. 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 Pathologic fractures in the right superior pubic ramus and left proximal fibula were noted on recent PET CT scan completed on 8/21/2024. Patient continues to take Eliquis daily (5 mg PO BID) for port r/t thrombus. Patient denies any s/s c/w easily bleeding or bruising.  Ms. Singh requested to speak with Nutrition today. Nutrition team was able to see the patient at the chairside. Appreciate note and recommendations. RTO: with each Cycle of Trodelvy, on Day 1. Appointments reviewed with patient and print out given.   8/28/2024 Patient presents today for repeat blood work and to complete [employer] Testing.  We again discussed the recent results of PTT/INR lab work that was significantly elevated on 8/23/2024. Patient was contacted on 8/26/2024 to initially discuss the elevated lab results. Ms. Singh reported that she was taking the Eliquis 5 mg PO BID as indicated. Upon questioning the patient further regarding the matter, she stated she ran out of the medication and needed a refill. Patient continues to deny as signs of bleeding or bruising. Results of PTT/INR today were WNL, and Eliquis 5mg PO BID was ordered to [facility].   Patient reported taking several supplements to include: Turmeric, Vitamin E, Black Seed Oil and several other non-FDA approved mixed/multi-vitamins up until Day 1 of treatment (8/23/2024). We discussed that these particular supplements may have unknown drug interactions with treatment and can also elevate both PTT & INR. The patient has since discontinued the supplements.   Discussed that [id number] testing is used to identify genomic alterations within the cancer's DNA that may make the patient eligible for a specific therapy/personalized treatment for her cancer. Patient verbalized consent and signed the necessary paperwork. Our office to convey results when they are made available.   Ms. Singh reports her pain is slightly improved since she started re-taking Dilaudid and Gabapentin as prescribed. Referral was made to Palliative Care, and an appointment was scheduled for 18 September 2024.   8/30/2024: day8 of Trodelvy,  She reports significant fatigue and altered taste x 3-4 days after chemo. She was able to function, maintained PO intake, weight stable. She had mild nausea, took Reglan, no vomiting, no diarrhea or mouth sores. No neuropathy, no fevers. Hb low, discussed PRBC tx PET baseline 8/2024 d/w her. She has several pathologic fractures. She doesnt want to go to ER, thinks they arent new. She has been hesitant about RT. Discussed importance of ortho onc consultation and pall RT for bone fractures and pain. No wt beaning until ortho consult.  She is on Eliquis 5 mg PO BID for port-related thrombus. PT/INR was significantly high, normalized on repeat labs  Has R shoulder, pelvis, LE pain somewhat controlled on Dilaudid every 4 hours, gabapentin Q3H. A. Pending palliative care appointment for pain management next month.  Does not have appetite but is taking small portions of meals. lso taking ensure Holding supplements 2 days before and after the chemo: Turmeric, Vitamin E, Black Seed Oil and several other non-FDA approved mixed/multi-vitamins up  [id number] testing sent during last visit Palliative Care, and an appointment was scheduled for 18 September 2024.   9/20/2024 Patient presents today for Cycle 2, Day 8 of Trodelvy.  She reports significant fatigue and altered taste x 3-4 days after chemo. She remains able to function, maintained PO intake, and her weight is stable. She continues to experience mild nausea, but it is improving, and no Reglan was needed after Cycle 2, Day 1. Denies fevers, vomiting, diarrhea, neuropathy or mouth sores.  Discussed low Hgb (8.1 g/dL), and possible need for PRBC transfusion. At this time, the patient denies being symptomatic and wishes to see if her bone marrow can recover on its own. She has been attending numerous appointments (Palliative, Orthopedics, RT) and is becoming overwhelmed.  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 Denies any SOB or palpitations. Ms. Singh has established care with Palliative Care on 18 September 2024 (follow-up scheduled in one week/Telemedicine Visit). Patient reports she enjoyed meeting this provider and was thankful for her recommendations to change the Hydromorphone from Q6 hours to Q4 hours. Additionally, she is now taking Tizanidine 2mg, 1 to 2 tablets every 8 hours for muscle spasms. She reports this medication has made a big difference and reports her overall pain is much more manageable/tolerable. Patient will also continue to take Gabapentin 300 mg TID. She is also now on a stricter bowel regimen taking Colace TID and Miralax daily. She is having BM's every other day.  Ms. Singh also met with Dr. Serena Bustos from Radiation Oncology on 9 September 2024. She reports being much more open to the idea of RT for pain relief and signed informed consent at the initial consultation. It was discussed at that visit a plan of care will be created once the patient established care with Dr. Lauren.  Ms. Singh established care with Dr. Subhash Lauren from Orthopedic Surgery. Patient was offered Right Hip Hemiarthroplasty and surgical stabilization of the Left femur. The patient declined surgical intervention and was instructed to return to the office in 1 month for repeat x-ray imaging.  Patient continues to hold supplements 2 days before and after the chemo: Turmeric, Vitamin E, Black Seed Oil and several other non-FDA approved mixed/multi-vitamins. RTO: with each Cycle of Trodelvy, on Day 8. [de-identified] : This is a very pleasant 61-year-old female diagnosed with metastatic triple negative breast cancer (metastatic disease to bones and lymph nodes) in July 2023, received Taxol Keytruda 7/202 3-1/2024, POD bones, Carboplatin Gemcitabine and Keytruda 2/202 4-5/2024, then she decided to take a treatment break.  She has been off treatment and reports worsening of palpable metastatic lymph nodes and worsening bone pain. Trodelvy initiated on 8/23/2024.  Palliative RT to the bone 9/2024 11/8/24 Patient is here now for [id number].  She reports significant fatigue and altered taste x 3-4 days after chemo. She was able to function, maintained PO intake, weight stable. She had mild nausea, took Reglan, no vomiting, no diarrhea or mouth sores. No neuropathy, no fevers She has malignant bone pain.  She is followed by palliative.  She is on Dilaudid and morphine.  She is able to shuffle slowly and get around.  Not able to bear weight. She had low hemoglobin which is stable now. she has met with orthopedic oncology and is refusing surgical fixation. 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 Denies any SOB or palpitations.  Port was replaced last week.  Lower extremity Doppler 11/2024 did not show evidence of DVT PET scan is pending next week

## 2024-11-08 NOTE — HISTORY OF PRESENT ILLNESS
[Disease: _____________________] : Disease: [unfilled] [de-identified] :  This is a very pleasant 61-year-old female presented today for an evaluation for metastatic triple negative breast cancer.  Patient reports she noticed a large lump under her right arm and lymphedema in July 2023.  She went to the emergency room in [place] and workup showed metastatic triple negative breast cancer.  She underwent a right lymph node biopsy which was reportedly triple negative.  Biopsy report is not available for review.  She started palliative intent Taxol and pembrolizumab July 2023.  A PET/CT September 2023 showed mixed response, and she continued on the same regimen.  A PET/CT in January 2024 showed marked progression of disease in the bones.  Treatment was changed to carboplatin gemcitabine and pembrolizumab in February 2024.  She reportedly developed neutropenia and needed Neulasta for subsequent cycles.  PET/CT May 2024 showed stable disease.  Her last chemotherapy treatment was on 5/17/2024.  She decided to move to [place] close to family.  She has not had any treatment since then.  She reports she wanted to take a treatment break and has been taking holistic treatments.  She was disappointed that  neck and armpit masses grew during treatment break.  She was seen at [facility] last month and is referred here for continuation of care.  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  She is fasting 16 to 24 hours and has lost 40 pounds in the last 4 months.  She has significant right hip and left leg pain.  She is taking Dilaudid as needed and gabapentin.  Currently pain is 5 out of 10.  She walks with a cane and pain gets worse on walking after 1-2 blocks.  She had radiation to spine last year and developed significant toxicity and does not want to take radiation anymore. She is currently getting home PT. she reports skin discoloration from radiation and Neulasta.  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  Port has not been placed in 3 months.  She has double-lumen port.  She reports BRCA testing was done but she is not aware of results.  She lives with her parents and her daughter is supporting her.  8/23/2024 Patient presents for Trodelvy Cycle 1, Day 1. She is accompanied by her daughter. All questions asked/answered prior to initiation of treatment.  Patient underwent Port Study on 8/2124 at [facility].  Procedure Findings demonstrated a left-sided double port with tip of catheter in the SVC. The left port was accessed under sterile conditions with blood return and appropriate flushing was demonstrated. Hand injection of contrast demonstrates filling of port well and lumen without evidence of fibrin sheath. Unfortunately, multiple attempts were made with right port however it was unsuccessful. The plan per the Interventional Radiologist included: 1. Left port may be accessed and utilized for her upcoming treatment of chemotherapy. 2. Suggestion of possible replacement with a single lumen Mediport. Ms. Singh c/o of significant pain 10/10 on the pain scale. Reports she ran out of Hydromorphone and Gabapentin that was dispensed in the ER since last week. Ms. Singh reported that her pain was well controlled when taking Hydromorphone and Gabapentin as prescribed. Refills sent to [facility], and delivered at chairside. 4mg of Hydromorphone prescribed to be given at chairside prior to treatment. 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 Pathologic fractures in the right superior pubic ramus and left proximal fibula were noted on recent PET CT scan completed on 8/21/2024. Patient continues to take Eliquis daily (5 mg PO BID) for port r/t thrombus. Patient denies any s/s c/w easily bleeding or bruising.  Ms. Singh requested to speak with Nutrition today. Nutrition team was able to see the patient at the chairside. Appreciate note and recommendations. RTO: with each Cycle of Trodelvy, on Day 1. Appointments reviewed with patient and print out given.   8/28/2024 Patient presents today for repeat blood work and to complete [employer] 360 Testing.  We again discussed the recent results of PTT/INR lab work that was significantly elevated on 8/23/2024. Patient was contacted on 8/26/2024 to initially discuss the elevated lab results. Ms. Singh reported that she was taking the Eliquis 5 mg PO BID as indicated. Upon questioning the patient further regarding the matter, she stated she ran out of the medication and needed a refill. Patient continues to deny as signs of bleeding or bruising. Results of PTT/INR today were WNL, and Eliquis 5mg PO BID was ordered to [facility].   Patient reported taking several supplements to include: Turmeric, Vitamin E, Black Seed Oil and several other non-FDA approved mixed/multi-vitamins up until Day 1 of treatment (8/23/2024). We discussed that these particular supplements may have unknown drug interactions with treatment and can also elevate both PTT & INR. The patient has since discontinued the supplements.   Discussed that [id number] testing is used to identify genomic alterations within the cancer's DNA that may make the patient eligible for a specific therapy/personalized treatment for her cancer. Patient verbalized consent and signed the necessary paperwork. Our office to convey results when they are made available.   Ms. Singh reports her pain is slightly improved since she started re-taking Dilaudid and Gabapentin as prescribed. Referral was made to Palliative Care, and an appointment was scheduled for 18 September 2024.   8/30/2024: day8 of Trodelvy,  She reports significant fatigue and altered taste x 3-4 days after chemo. She was able to function, maintained PO intake, weight stable. She had mild nausea, took Reglan, no vomiting, no diarrhea or mouth sores. No neuropathy, no fevers. Hb low, discussed PRBC tx PET baseline 8/2024 d/w her. She has several pathologic fractures. She doesnt want to go to ER, thinks they arent new. She has been hesitant about RT. Discussed importance of ortho onc consultation and pall RT for bone fractures and pain. No wt beaning until ortho consult.  She is on Eliquis 5 mg PO BID for port-related thrombus. PT/INR was significantly high, normalized on repeat labs  Has R shoulder, pelvis, LE pain somewhat controlled on Dilaudid every 4 hours, gabapentin Q3H. A. Pending palliative care appointment for pain management next month.  Does not have appetite but is taking small portions of meals. lso taking ensure Holding supplements 2 days before and after the chemo: Turmeric, Vitamin E, Black Seed Oil and several other non-FDA approved mixed/multi-vitamins up  [id number] testing sent during last visit Palliative Care, and an appointment was scheduled for 18 September 2024.   9/20/2024 Patient presents today for Cycle 2, Day 8 of Trodelvy.  She reports significant fatigue and altered taste x 3-4 days after chemo. She remains able to function, maintained PO intake, and her weight is stable. She continues to experience mild nausea, but it is improving, and no Reglan was needed after Cycle 2, Day 1. Denies fevers, vomiting, diarrhea, neuropathy or mouth sores.  Discussed low Hgb (8.1 g/dL), and possible need for PRBC transfusion. At this time, the patient denies being symptomatic and wishes to see if her bone marrow can recover on its own. She has been attending numerous appointments (Palliative, Orthopedics, RT) and is becoming overwhelmed.  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 Denies any SOB or palpitations. Ms. Singh has established care with Palliative Care on 18 September 2024 (follow-up scheduled in one week/Telemedicine Visit). Patient reports she enjoyed meeting this provider and was thankful for her recommendations to change the Hydromorphone from Q6 hours to Q4 hours. Additionally, she is now taking Tizanidine 2mg, 1 to 2 tablets every 8 hours for muscle spasms. She reports this medication has made a big difference and reports her overall pain is much more manageable/tolerable. Patient will also continue to take Gabapentin 300 mg TID. She is also now on a stricter bowel regimen taking Colace TID and Miralax daily. She is having BM's every other day.  Ms. Singh also met with Dr. Serena Bustos from Radiation Oncology on 9 September 2024. She reports being much more open to the idea of RT for pain relief and signed informed consent at the initial consultation. It was discussed at that visit a plan of care will be created once the patient established care with Dr. Lauren.  Ms. Singh established care with Dr. Subhash Lauren from Orthopedic Surgery. Patient was offered Right Hip Hemiarthroplasty and surgical stabilization of the Left femur. The patient declined surgical intervention and was instructed to return to the office in 1 month for repeat x-ray imaging.  Patient continues to hold supplements 2 days before and after the chemo: Turmeric, Vitamin E, Black Seed Oil and several other non-FDA approved mixed/multi-vitamins. RTO: with each Cycle of Trodelvy, on Day 8. [de-identified] : This is a very pleasant 61-year-old female diagnosed with metastatic triple negative breast cancer (metastatic disease to bones and lymph nodes) in July 2023, received Taxol Keytruda 7/202 3-1/2024, POD bones, Carboplatin Gemcitabine and Keytruda 2/202 4-5/2024, then she decided to take a treatment break.  She has been off treatment and reports worsening of palpable metastatic lymph nodes and worsening bone pain. Trodelvy initiated on 8/23/2024.  Palliative RT to the bone 9/2024 11/8/24 Patient is here now for [id number].  She reports significant fatigue and altered taste x 3-4 days after chemo. She was able to function, maintained PO intake, weight stable. She had mild nausea, took Reglan, no vomiting, no diarrhea or mouth sores. No neuropathy, no fevers She has malignant bone pain.  She is followed by palliative.  She is on Dilaudid and morphine.  She is able to shuffle slowly and get around.  Not able to bear weight. She had low hemoglobin which is stable now. she has met with orthopedic oncology and is refusing surgical fixation. 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 Denies any SOB or palpitations.  Port was replaced last week.  Lower extremity Doppler 11/2024 did not show evidence of DVT PET scan is pending next week

## 2024-11-08 NOTE — ASSESSMENT
[FreeTextEntry1] : This is a very pleasant 61-year-old female diagnosed with metastatic triple negative breast cancer (metastatic disease to bones and lymph nodes) in July 2023, received Taxol Keytruda 7/202 3-1/2024, POD bones, Carboplatin Gemcitabine and Keytruda 2/202 4-5/2024, then she decided to take a treatment break.  She has been off treatment and reports worsening of palpable metastatic lymph nodes and worsening bone pain. Trodelvy initiated on 8/23/2024.     METASTATIC BREAST CA: Triple Negative (ER: Negative < 1%, RI: Negative < 1%, HER2: Negative, 1+ staining). Slide Review completed by [employer] on 8/14/2024. Original biopsy site: Right Axilla. Biopsy completed at [facility] on 7/12/2023.    11/8/24 Patient is here now for [id number].  She reports significant fatigue and altered taste x 3-4 days after chemo. She was able to function, maintained PO intake, weight stable. She had mild nausea, took Reglan, no vomiting, no diarrhea or mouth sores. No neuropathy, no fevers She has malignant bone pain.  She is followed by palliative.  She is on Dilaudid and morphine.  She is able to shuffle slowly and get around.  Not able to bear weight. She had low hemoglobin which is stable now. she has met with orthopedic oncology and is refusing surgical fixation. 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 Denies any SOB or palpitations.  Port was replaced last week.  Lower extremity Doppler 11/2024 did not show evidence of DVT PET scan is pending next week  CBC reviewed with the patient today to make sure she is not neutropenic from high-risk cancer therapy drug.  We will continue to monitor CBC every 2 to 4 weeks for intense drug monitoring. Patient is on cytotoxic chemotherapy and needs intensive monitoring for severe toxicity.  [id number] testing sent during last visit Plan to re-biopsy on progression   MALIGNANT BONE PAIN:  PET baseline 8/2024 d/w her. She has several pathologic fractures. Ms. Singh has established care with Palliative Care on 18 September 2024 (follow-up scheduled in one week/Telemedicine Visit). Patient reports she enjoyed meeting this provider and was thankful for her recommendations to change the Hydromorphone from Q6 hours to Q4 hours. Additionally, she is now taking Tizanidine 2mg, 1 to 2 tablets every 8 hours for muscle spasms. She reports this medication has made a big difference and reports her overall pain is much more manageable/tolerable. Patient will also continue to take Gabapentin 300 mg TID.   Ms. Singh also met with Dr. Serena Bustos from Radiation Oncology on 9 September 2024. She reports being much more open to the idea of RT for pain relief and signed informed consent at the initial consultation. It was discussed at that visit a plan of care will be created once the patient established care with Dr. Lauren.  Ms. Singh established care with Dr. Subhash Lauren from Orthopedic Surgery. Patient was offered Right Hip Hemiarthroplasty and surgical stabilization of the Left femur. The patient declined surgical intervention and was instructed to return to the office in 1 month for repeat x-ray imaging.    LYMPHADENOPATHY: Visible and palpable Right Cervical and Axillary lymphadenopathy. Mild lymphedema secondary. Will continue to monitor.  CHEMO INDUCED NEUROPATHY: Mild. Continue Gabapentin, 300 mg TID.   WEIGHT LOSS: Likely secondary to malignancy as well as intermittent fasting.  Recommend not to fast during chemotherapy as she will need calorie intake. Nutrition consulted on 8/23/2024; patient seen chairside during Trodelvy Cycle 1, Day 1. Appreciate note and recommendations.   PORT ISSUES: Patient continues to take Eliquis daily (5 mg PO BID) for port r/t thrombus.   GENETICS: Obtain BRCA test results to see if she is a candidate for Olaparib.  FATIGUE:  2/2 MALIGNANCY and treatment: encourage increase activity as tolerated. Mild fatigue tolerable/stable, energy waxes/wanes.  - Chemotherapy induced anemia- Grade 3. Recommend PRBC transfusion to maintain Hb> 8.  At this time, the patient denies being symptomatic and wishes to see if her bone marrow can recover on its own. She has been attending numerous appointments (Palliative, Orthopedics, RT) and is becoming overwhelmed.  - Risk for Chemotherapy induced diarrhea- Imodium PRN. Maintain PO hydration. BRAT diet - Risk for Chemotherapy induced N/V- Will get pre-medications with Emend, Dexamethasone and Aloxi. She will continue Dexamethasone for next 3 days for antinausea prophylaxis. She will use Reglan as needed.  - GF induced bone pain- Patient to take Claritin Days 2-7. - Chemotherapy induced dysgeusia, wt loss and fatigue: Encourage oral fluids, small frequent meals.  - Chemo induced neuropathy- continue to monitor. - Alopecia- prescription for wig given.  - Instructed to call office and go directly to the emergency room with fever more than 100.4, shaking chills, productive cough, sore throat, shortness of breath or urinary symptoms. Patient verbalized understanding and agreement. - Emotional support provided, all questions answered.  CHEMO AND BLOOD WORK ORDERED IN [place] FOR TODAY APPTS REVIEWED AND SCHEDULED DURING THIS VISIT RTO: with each Cycle of Trodelvy, on Day 8. Appointments reviewed with patient and print out given.

## 2024-11-08 NOTE — PHYSICAL EXAM
[Ambulatory and capable of all self care but unable to carry out any work activities] : Status 2- Ambulatory and capable of all self care but unable to carry out any work activities. Up and about more than 50% of waking hours [Normal] : affect appropriate [de-identified] : in wheelchair  [de-identified] : large right axillary and right cervical LN

## 2024-11-08 NOTE — ASSESSMENT
[FreeTextEntry1] : This is a very pleasant 61-year-old female diagnosed with metastatic triple negative breast cancer (metastatic disease to bones and lymph nodes) in July 2023, received Taxol Keytruda 7/202 3-1/2024, POD bones, Carboplatin Gemcitabine and Keytruda 2/202 4-5/2024, then she decided to take a treatment break.  She has been off treatment and reports worsening of palpable metastatic lymph nodes and worsening bone pain. Trodelvy initiated on 8/23/2024.     METASTATIC BREAST CA: Triple Negative (ER: Negative < 1%, OR: Negative < 1%, HER2: Negative, 1+ staining). Slide Review completed by [employer] on 8/14/2024. Original biopsy site: Right Axilla. Biopsy completed at [facility] on 7/12/2023.    11/8/24 Patient is here now for [id number].  She reports significant fatigue and altered taste x 3-4 days after chemo. She was able to function, maintained PO intake, weight stable. She had mild nausea, took Reglan, no vomiting, no diarrhea or mouth sores. No neuropathy, no fevers She has malignant bone pain.  She is followed by palliative.  She is on Dilaudid and morphine.  She is able to shuffle slowly and get around.  Not able to bear weight. She had low hemoglobin which is stable now. she has met with orthopedic oncology and is refusing surgical fixation. 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 Denies any SOB or palpitations.  Port was replaced last week.  Lower extremity Doppler 11/2024 did not show evidence of DVT PET scan is pending next week  CBC reviewed with the patient today to make sure she is not neutropenic from high-risk cancer therapy drug.  We will continue to monitor CBC every 2 to 4 weeks for intense drug monitoring. Patient is on cytotoxic chemotherapy and needs intensive monitoring for severe toxicity.  [id number] testing sent during last visit Plan to re-biopsy on progression   MALIGNANT BONE PAIN:  PET baseline 8/2024 d/w her. She has several pathologic fractures. Ms. Singh has established care with Palliative Care on 18 September 2024 (follow-up scheduled in one week/Telemedicine Visit). Patient reports she enjoyed meeting this provider and was thankful for her recommendations to change the Hydromorphone from Q6 hours to Q4 hours. Additionally, she is now taking Tizanidine 2mg, 1 to 2 tablets every 8 hours for muscle spasms. She reports this medication has made a big difference and reports her overall pain is much more manageable/tolerable. Patient will also continue to take Gabapentin 300 mg TID.   Ms. Singh also met with Dr. Serena Bustos from Radiation Oncology on 9 September 2024. She reports being much more open to the idea of RT for pain relief and signed informed consent at the initial consultation. It was discussed at that visit a plan of care will be created once the patient established care with Dr. Lauren.  Ms. Singh established care with Dr. Subhash Lauren from Orthopedic Surgery. Patient was offered Right Hip Hemiarthroplasty and surgical stabilization of the Left femur. The patient declined surgical intervention and was instructed to return to the office in 1 month for repeat x-ray imaging.    LYMPHADENOPATHY: Visible and palpable Right Cervical and Axillary lymphadenopathy. Mild lymphedema secondary. Will continue to monitor.  CHEMO INDUCED NEUROPATHY: Mild. Continue Gabapentin, 300 mg TID.   WEIGHT LOSS: Likely secondary to malignancy as well as intermittent fasting.  Recommend not to fast during chemotherapy as she will need calorie intake. Nutrition consulted on 8/23/2024; patient seen chairside during Trodelvy Cycle 1, Day 1. Appreciate note and recommendations.   PORT ISSUES: Patient continues to take Eliquis daily (5 mg PO BID) for port r/t thrombus.   GENETICS: Obtain BRCA test results to see if she is a candidate for Olaparib.  FATIGUE:  2/2 MALIGNANCY and treatment: encourage increase activity as tolerated. Mild fatigue tolerable/stable, energy waxes/wanes.  - Chemotherapy induced anemia- Grade 3. Recommend PRBC transfusion to maintain Hb> 8.  At this time, the patient denies being symptomatic and wishes to see if her bone marrow can recover on its own. She has been attending numerous appointments (Palliative, Orthopedics, RT) and is becoming overwhelmed.  - Risk for Chemotherapy induced diarrhea- Imodium PRN. Maintain PO hydration. BRAT diet - Risk for Chemotherapy induced N/V- Will get pre-medications with Emend, Dexamethasone and Aloxi. She will continue Dexamethasone for next 3 days for antinausea prophylaxis. She will use Reglan as needed.  - GF induced bone pain- Patient to take Claritin Days 2-7. - Chemotherapy induced dysgeusia, wt loss and fatigue: Encourage oral fluids, small frequent meals.  - Chemo induced neuropathy- continue to monitor. - Alopecia- prescription for wig given.  - Instructed to call office and go directly to the emergency room with fever more than 100.4, shaking chills, productive cough, sore throat, shortness of breath or urinary symptoms. Patient verbalized understanding and agreement. - Emotional support provided, all questions answered.  CHEMO AND BLOOD WORK ORDERED IN [place] FOR TODAY APPTS REVIEWED AND SCHEDULED DURING THIS VISIT RTO: with each Cycle of Trodelvy, on Day 8. Appointments reviewed with patient and print out given.

## 2024-11-08 NOTE — PHYSICAL EXAM
[Ambulatory and capable of all self care but unable to carry out any work activities] : Status 2- Ambulatory and capable of all self care but unable to carry out any work activities. Up and about more than 50% of waking hours [Normal] : affect appropriate [de-identified] : in wheelchair  [de-identified] : large right axillary and right cervical LN

## 2024-11-12 NOTE — PHYSICAL EXAM
[General Appearance - Alert] : alert [General Appearance - Well Nourished] : well nourished [General Appearance - Well Developed] : well developed [Sclera] : the sclera and conjunctiva were normal [Extraocular Movements] : extraocular movements were intact [Outer Ear] : the ears and nose were normal in appearance [Hearing Threshold Finger Rub Not [place]] : hearing was normal [] : no respiratory distress [Respiration, Rhythm And Depth] : normal respiratory rhythm and effort [Auscultation Breath Sounds / Voice Sounds] : lungs were clear to auscultation bilaterally [Heart Rate And Rhythm] : heart rate was normal and rhythm regular [Heart Sounds] : normal S1 and S2 [Full Pulse] : the pedal pulses are present [Bowel Sounds] : normal bowel sounds [Abdomen Soft] : soft [Abdomen Tenderness] : non-tender [No Focal Deficits] : no focal deficits [Oriented To Time, Place, And Person] : oriented to person, place, and time [Impaired Insight] : insight and judgment were intact [Affect] : the affect was normal [Mood] : the mood was normal [FreeTextEntry1] : TTP R thigh and hip. Limited ROM due to discomfort

## 2024-11-12 NOTE — DATA REVIEWED
[FreeTextEntry1] : US Hernandez MERCEDES (11/1/2024)  FINDINGS:  RIGHT: Normal compressibility of the RIGHT common femoral, femoral and popliteal veins. Doppler examination shows normal spontaneous and phasic flow. No RIGHT calf vein thrombosis is detected.  LEFT: Normal compressibility of the LEFT common femoral, femoral and popliteal veins. Doppler examination shows normal spontaneous and phasic flow. No LEFT calf vein thrombosis is detected.  IMPRESSION: No evidence of deep venous thrombosis in either lower extremity.   EXAM: [id number] - PETCT SKUL-THI ONC FDG SUBS  - ORDERED BY: GREGOR ARCINIEGA   PROCEDURE DATE:  08/21/2024    INTERPRETATION:  CLINICAL INFORMATION: Right breast cancer, metastatic. Evaluate extent of disease.  TREATMENT STRATEGY EVALUATION: Subsequent AREA IMAGED: Skull base-to-thigh FASTING BLOOD SUGAR: 84 mg/dl RADIOPHARMACEUTICAL: 10.69 mCi F-18 FDG, I.V. I.V. SITE: hand left UPTAKE PERIOD: 56 min SCANNER: [employer] ORAL CONTRAST: Omnipaque 300 PHARMACOLOGIC INTERVENTION: None.  TECHNIQUE: Following intravenous injection of above radiopharmaceutical and uptake period, PET/CT was performed. CT protocol was optimized for PET attenuation correction and anatomic localization and was not designed to produce and cannot replace state-of-the-art diagnostic CT images with specific imaging protocols for different body parts and indications. Images were reconstructed and reviewed in axial, coronal and sagittal views and three-dimensional MIP.  The standardized uptake values (SUV) are normalized to patient body weight and indicate the highest activity concentration (SUVmax) in a given site. All image numbers refer to axial image number.  COMPARISON:  None.  OTHER STUDIES USED FOR CORRELATION: CT 7/14/2024  FINDINGS:  HEAD/NECK: Physiologic FDG activity in visualized brain, head, and neck.  THORAX: Low-level FDG uptake in asymmetrical soft tissue in the right breast and cutaneous tissues. FDG avid right supraclavicular, right axillary and right subpectoral lymphadenopathy. Index nodes: Right supraclavicular, 2.5 x 1.9 cm, SUV 17 (image 59). Largest lymph node in the right axilla, 5.2 x 5.0 cm, SUV 16 (image 86).  Left Port-A-Cath with tip in the superior vena cava focal FDG uptake along the course of the catheter likely due to focal retention of radiotracer within the catheter.  LUNGS: No abnormal FDG activity. No nodule.  PLEURA/PERICARDIUM: No abnormal FDG activity. No effusion.  HEPATOBILIARY/PANCREAS: Physiologic FDG activity.  For reference, normal liver demonstrates SUV mean 2.3. Cholecystectomy.  SPLEEN: Physiologic FDG activity. Normal in size.  ADRENAL GLANDS: No abnormal FDG activity. No nodule.  KIDNEYS/URINARY BLADDER: Physiologic excreted FDG activity. Cystocele.  REPRODUCTIVE ORGANS: No abnormal FDG activity.  ABDOMINOPELVIC LYMPH NODES/RETROPERITONEUM: A few FDG avid lymph nodes are evident in the pelvis. For example, a right inguinal lymph node measures 1.6 x 1.2 cm, SUV 11 (image 253).  ESOPHAGUS/STOMACH/BOWEL/PERITONEUM/MESENTERY: No abnormal FDG activity. Bowel activity appears physiological.  VESSELS: Unremarkable.  BONES/SOFT TISSUES: Multiple FDG avid bone lesions, some of which are large, for example in the right scapula, right iliac, left iliac, left ischium, right femoral head, left distal femoral shaft, and left fibula. Index lesions: Large right scapular destructive lytic and soft tissue lesion, SUV 18 (image 82). Large destructive and soft tissue lesion in the right iliac, SUV 17 (image 224). Lytic lesion in the right femoral head, SUV 12 (image 242). FDG avid lesion of the right superior pubic ramus also shows fracture. FDG avid lesion associated with the left proximal fibula also shows fracture.   Radiopharmaceutical contamination evident posterior to the pelvis. Mild FDG uptake and soft tissue stranding in the muscles of the lower back and pelvis may relate to recent trauma.  IMPRESSION:  1. Multiple FDG avid right supraclavicular, subpectoral and axillary lymph nodes, consistent with metastases. FDG avid pelvic lymphadenopathy is also evident.  2. Multiple destructive lytic and soft tissue FDG avid bone metastases, several of which are large. Pathologic fractures in the right superior pubic ramus and left proximal fibula.  3. Mildly asymmetrical FDG uptake in the right breast and skin.  Report emailed via secure email to Dr. Gregor Arciniega at time of interpretation.  --- End of Report ---

## 2024-11-12 NOTE — HISTORY OF PRESENT ILLNESS
[FreeTextEntry1] : 62yo F presents for follow up, referred by oncology.  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   Oncology hx: She presented with a right axillary mass and arm swelling in July 2023. She went to an emergency room in [place] and diagnosed with metastatic triple negative breast cancer. She received palliative Taxol and pembrolizumab in July 2023. She underwent a course of palliative radiation therapy to the lower spine, completing treatment in September 2023. Progression of disease was noted in January 2024, so treatment was switched to carboplatin gemcitabine and pembrolizumab in February 2024.  In May 2024, she relocated from [place] to [place]. She has resumed treatment under the care of Dr. Arciniega.  CT angiogram on 7/14/24 showed extensive right axillary and supraclavicular adenopathy measuring up to 4.9 cm. CT abdomen and pelvis showed multiple lytic bone lesions involving L5, the sacrum, pelvic bones and right femoral head.  PET/CT on 8/21/24 showed avid right supraclavicular, subpectoral, and axillary lymph nodes. Multiple lytic and soft tissue avid bone metastases were present associated with pathological fractures of the right superior pubic ramus and left proximal fibula. There were multiple lesions of the right breast and skin.  She started treatment with Trodelvy on 8/23/24.  [facility] visit 9/4: She is having pain from her waist down since March 2024. The pain is most severe in the right hip region and the left lower leg. The pain is worse with weight bearing. She cannot walk without assistance. She is also having pain in her right shoulder, clavicle region when she moves her arm a certain way. She is taking hydromorphone 4mg every 6 hours and gabapentin 300mg tid, with some temporary relief. She will meet with orthopedic surgery, Dr. Lauren, next week.  Ortho visit 9/11: 61F w/ multifocal skeletal metastatic breast ca to bone. The primary areas involved are the right femoral neck/head, R ilium, L posterior acetabulum, L ischium, L femoral shaft, and R scapula. The current PET was not a full body study therefore I have rec'd a bone scan for further assessment and to establish a baseline of her current skeletal disease involvement. She is symptomatic with regards to the right hip which is at risk for developing a pathologic fracture. I would not recommend IMN at this stage given the lytic region in the femoral head. For this reason the best surgical option would be right hip hemiarthroplasty- a procedure which can be done electively or if she breaks at some point in the future. At this stage the main focus is on delivering chemotherapy, a treatment which would be halted if we proceed with surgery. For this reason, I think it's best to hold off on surgery for now while she gets systemic treatment, with the understanding that she will require hemiarthroplasty if she develops a fracture in the future. I have also recommended that she follow up with a general surgeon with regards to the right axillary adenopathy for further evaluation.    Onc 10/11/24: Patient is here now for cycle 3D1. She was admitted at [facility] with intractable pain. she received palliative RT. Pain level is 5-7 out of 10. She is followed by palliative. She is on Dilaudid and morphine. She is able to shuffle slowly and get around. Not able to bear weight.  She states that her appetite and fatigue has improved secondary to transient chemo break. Weight is stable. No neuropathy or fevers. I recommend to complete 2 additional cycles of Trodelvy followed by repeat scans.  She had low hemoglobin which is stable now. she has met with orthopedic oncology and is refusing pending surgical fixation. 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 Denies any SOB or palpitations. ----------------------------------------------------  Pt was initially referred to supportive oncology for symptom management. She is in a wheelchair, but usually ambulates with a walker. She describes pain located in R hip, radiates to R knee and anterior thigh. No numbness. Described as severe ache and spasms. Currently at 7/10. Dilaudid brings it down to 7/10. Last taken around 1pm today. Following a schedule with Dilaudid 4mg, taking at 1am, 7am, 1pm, 7pm. Relief lasts about 4 hours. The last 5 days, pain has significantly worsened. Heating pads provide some relief. The pain never goes below 7/10, and at this level she is unable to carry out ADLs, interact with family or socialize. She is also taking Gabapentin 300mg TID. Using Senna and Miralax. Had BM yesterday. Denies nausea, vomiting. Poor appetite due to pain. Poor sleep, laying in bed a lot and waking up due to pain. Difficult to express emotions due to the pain, remains quiet.  Social: Lives with mom (Kathryn) and dad (Henrik Chisholm), daughter (Irina Singh), and son. Moved from [place] 3 months ago. 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 She is S/p 5 fractions of radiation, ended on 10/2/24. During that admission, she was seen by palliative care team and started on Morphine ER 30mg q8h, and Dilaudid PO 4-6mg q4h PRN. She states her pain seemed to be well controlled up until 2 days ago where the pain in her hip/legs have worsened. She denies any recent trauma or fall. She is taking Dilaudid PO 4mg once in the morning and Tizanidine 2mg as needed, on average once or less per day. When she takes the Dialudid 4mg PO in the morning, she gets 1-2 hrs of relief. She was hesitant to take it any more times per day because she was concerned to mix it with her long-acting morphine. She also continues on Gabapentin 300mg TID. She continues on a bowel regimen with regular BMs. She has no other concerns today. Next chemo is this Friday.  10/21/24 Interval History: Ran out of Morphine ER 10/16. Taking Dilaudid PO 4mg 1.5 tab about 2-3x daily. She states since being off the Morphine ER, she feels she may not need to restart a long acting. She believes the radiation may be helping her pain and she doesn't need a long acting. She takes Claritin after Nulesta injections with relief for about 5 days. She is also taking Tizanidine 2mg about once daily with relief. Admits to constipation, taking Senna sporadically but now taking 3 tab at bedtime.  11/1/2024 Interval History: Since d/c the MSER she reports pain has risen, stating it rises to 10/10. She has been utilizing hydromorphone 6mg three to four times daily. She utilizes Tizanidine 2mg once to twice daily with limited relief. She states the pain lessens if she lays still. She reports pain to sacral stage III continues to offset weight, apply barrier creams and dressings. Pain from the pressure injury often wakes her at night. She is eating two meals daily, denies N/V/D. Today she presents chairside with swelling to RLE, + pedal pulses. She states she is compliant with Eliquis regimen.   11/8/2024 Interval History: Pt seen in treatment room, accompanied by daughter. She states her pain is better controlled with the resumption of MSER 30mg BID, hydromorphone 6mg PRN which she utilizes once to twice daily, tizanidine 2mg sparingly. She reports pain, in particular, to her RLE with movement. She rates the pain as 'ok' in general with exacerbations with movement, rating 10/10 on occasion. She states the hydromorphone works well in controlling these spikes in pain. She reports moderate pain to her pressure injury and reports it is 'healing nicely'. She continues to offset her weight, apply barrier cream and apply mepilex dressing regularly. She reports difficulty sleeping, no more than three hours at a time, attributes to positioning and healing pressure injury. Moderate swelling continues to DESTINEY LE, encouraged to ambulate, elevate, and maintain protein intake. She states oral intake is low, forces herself to eat. She states she has little appetite and altered taste. Defers medical cannabis at this time. Denies N/V, reports bowels are regular every 1-2 days with bowel regimen in place.     Istop Ref#: [id number]

## 2024-11-12 NOTE — PHYSICAL EXAM
[General Appearance - Alert] : alert [General Appearance - Well Nourished] : well nourished [General Appearance - Well Developed] : well developed [Sclera] : the sclera and conjunctiva were normal [Extraocular Movements] : extraocular movements were intact [Outer Ear] : the ears and nose were normal in appearance [Hearing Threshold Finger Rub Not Pepin] : hearing was normal [] : no respiratory distress [Respiration, Rhythm And Depth] : normal respiratory rhythm and effort [Auscultation Breath Sounds / Voice Sounds] : lungs were clear to auscultation bilaterally [Heart Rate And Rhythm] : heart rate was normal and rhythm regular [Heart Sounds] : normal S1 and S2 [Full Pulse] : the pedal pulses are present [Bowel Sounds] : normal bowel sounds [Abdomen Soft] : soft [Abdomen Tenderness] : non-tender [No Focal Deficits] : no focal deficits [Oriented To Time, Place, And Person] : oriented to person, place, and time [Impaired Insight] : insight and judgment were intact [Affect] : the affect was normal [Mood] : the mood was normal [FreeTextEntry1] : TTP R thigh and hip. Limited ROM due to discomfort

## 2024-11-12 NOTE — DATA REVIEWED
[FreeTextEntry1] : US Hernandez MERCEDES (11/1/2024)  FINDINGS:  RIGHT: Normal compressibility of the RIGHT common femoral, femoral and popliteal veins. Doppler examination shows normal spontaneous and phasic flow. No RIGHT calf vein thrombosis is detected.  LEFT: Normal compressibility of the LEFT common femoral, femoral and popliteal veins. Doppler examination shows normal spontaneous and phasic flow. No LEFT calf vein thrombosis is detected.  IMPRESSION: No evidence of deep venous thrombosis in either lower extremity.   EXAM: [id number] - PETCT SKUL-THI ONC FDG SUBS  - ORDERED BY: GREGOR ARCINIEGA   PROCEDURE DATE:  08/21/2024    INTERPRETATION:  CLINICAL INFORMATION: Right breast cancer, metastatic. Evaluate extent of disease.  TREATMENT STRATEGY EVALUATION: Subsequent AREA IMAGED: Skull base-to-thigh FASTING BLOOD SUGAR: 84 mg/dl RADIOPHARMACEUTICAL: 10.69 mCi F-18 FDG, I.V. I.V. SITE: hand left UPTAKE PERIOD: 56 min SCANNER: [place] Gen2 ORAL CONTRAST: Omnipaque 300 PHARMACOLOGIC INTERVENTION: None.  TECHNIQUE: Following intravenous injection of above radiopharmaceutical and uptake period, PET/CT was performed. CT protocol was optimized for PET attenuation correction and anatomic localization and was not designed to produce and cannot replace state-of-the-art diagnostic CT images with specific imaging protocols for different body parts and indications. Images were reconstructed and reviewed in axial, coronal and sagittal views and three-dimensional MIP.  The standardized uptake values (SUV) are normalized to patient body weight and indicate the highest activity concentration (SUVmax) in a given site. All image numbers refer to axial image number.  COMPARISON:  None.  OTHER STUDIES USED FOR CORRELATION: CT 7/14/2024  FINDINGS:  HEAD/NECK: Physiologic FDG activity in visualized brain, head, and neck.  THORAX: Low-level FDG uptake in asymmetrical soft tissue in the right breast and cutaneous tissues. FDG avid right supraclavicular, right axillary and right subpectoral lymphadenopathy. Index nodes: Right supraclavicular, 2.5 x 1.9 cm, SUV 17 (image 59). Largest lymph node in the right axilla, 5.2 x 5.0 cm, SUV 16 (image 86).  Left Port-A-Cath with tip in the superior vena cava focal FDG uptake along the course of the catheter likely due to focal retention of radiotracer within the catheter.  LUNGS: No abnormal FDG activity. No nodule.  PLEURA/PERICARDIUM: No abnormal FDG activity. No effusion.  HEPATOBILIARY/PANCREAS: Physiologic FDG activity.  For reference, normal liver demonstrates SUV mean 2.3. Cholecystectomy.  SPLEEN: Physiologic FDG activity. Normal in size.  ADRENAL GLANDS: No abnormal FDG activity. No nodule.  KIDNEYS/URINARY BLADDER: Physiologic excreted FDG activity. Cystocele.  REPRODUCTIVE ORGANS: No abnormal FDG activity.  ABDOMINOPELVIC LYMPH NODES/RETROPERITONEUM: A few FDG avid lymph nodes are evident in the pelvis. For example, a right inguinal lymph node measures 1.6 x 1.2 cm, SUV 11 (image 253).  ESOPHAGUS/STOMACH/BOWEL/PERITONEUM/MESENTERY: No abnormal FDG activity. Bowel activity appears physiological.  VESSELS: Unremarkable.  BONES/SOFT TISSUES: Multiple FDG avid bone lesions, some of which are large, for example in the right scapula, right iliac, left iliac, left ischium, right femoral head, left distal femoral shaft, and left fibula. Index lesions: Large right scapular destructive lytic and soft tissue lesion, SUV 18 (image 82). Large destructive and soft tissue lesion in the right iliac, SUV 17 (image 224). Lytic lesion in the right femoral head, SUV 12 (image 242). FDG avid lesion of the right superior pubic ramus also shows fracture. FDG avid lesion associated with the left proximal fibula also shows fracture.   Radiopharmaceutical contamination evident posterior to the pelvis. Mild FDG uptake and soft tissue stranding in the muscles of the lower back and pelvis may relate to recent trauma.  IMPRESSION:  1. Multiple FDG avid right supraclavicular, subpectoral and axillary lymph nodes, consistent with metastases. FDG avid pelvic lymphadenopathy is also evident.  2. Multiple destructive lytic and soft tissue FDG avid bone metastases, several of which are large. Pathologic fractures in the right superior pubic ramus and left proximal fibula.  3. Mildly asymmetrical FDG uptake in the right breast and skin.  Report emailed via secure email to Dr. Gregor Arciniega at time of interpretation.  --- End of Report ---

## 2024-11-12 NOTE — PHYSICAL EXAM
[General Appearance - Alert] : alert [General Appearance - Well Nourished] : well nourished [General Appearance - Well Developed] : well developed [Sclera] : the sclera and conjunctiva were normal [Extraocular Movements] : extraocular movements were intact [Outer Ear] : the ears and nose were normal in appearance [Hearing Threshold Finger Rub Not Teller] : hearing was normal [] : no respiratory distress [Respiration, Rhythm And Depth] : normal respiratory rhythm and effort [Auscultation Breath Sounds / Voice Sounds] : lungs were clear to auscultation bilaterally [Heart Rate And Rhythm] : heart rate was normal and rhythm regular [Heart Sounds] : normal S1 and S2 [Full Pulse] : the pedal pulses are present [Bowel Sounds] : normal bowel sounds [Abdomen Soft] : soft [Abdomen Tenderness] : non-tender [No Focal Deficits] : no focal deficits [Oriented To Time, Place, And Person] : oriented to person, place, and time [Impaired Insight] : insight and judgment were intact [Affect] : the affect was normal [Mood] : the mood was normal [FreeTextEntry1] : TTP R thigh and hip. Limited ROM due to discomfort

## 2024-11-12 NOTE — HISTORY OF PRESENT ILLNESS
[FreeTextEntry1] : 60yo F presents for follow up, referred by oncology.  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   Oncology hx: She presented with a right axillary mass and arm swelling in July 2023. She went to an emergency room in [place] and diagnosed with metastatic triple negative breast cancer. She received palliative Taxol and pembrolizumab in July 2023. She underwent a course of palliative radiation therapy to the lower spine, completing treatment in September 2023. Progression of disease was noted in January 2024, so treatment was switched to carboplatin gemcitabine and pembrolizumab in February 2024.  In May 2024, she relocated from [place] to [place]. She has resumed treatment under the care of Dr. Arcinigea.  CT angiogram on 7/14/24 showed extensive right axillary and supraclavicular adenopathy measuring up to 4.9 cm. CT abdomen and pelvis showed multiple lytic bone lesions involving L5, the sacrum, pelvic bones and right femoral head.  PET/CT on 8/21/24 showed avid right supraclavicular, subpectoral, and axillary lymph nodes. Multiple lytic and soft tissue avid bone metastases were present associated with pathological fractures of the right superior pubic ramus and left proximal fibula. There were multiple lesions of the right breast and skin.  She started treatment with Trodelvy on 8/23/24.  [facility] visit 9/4: She is having pain from her waist down since March 2024. The pain is most severe in the right hip region and the left lower leg. The pain is worse with weight bearing. She cannot walk without assistance. She is also having pain in her right shoulder, clavicle region when she moves her arm a certain way. She is taking hydromorphone 4mg every 6 hours and gabapentin 300mg tid, with some temporary relief. She will meet with orthopedic surgery, Dr. Lauren, next week.  Ortho visit 9/11: 61F w/ multifocal skeletal metastatic breast ca to bone. The primary areas involved are the right femoral neck/head, R ilium, L posterior acetabulum, L ischium, L femoral shaft, and R scapula. The current PET was not a full body study therefore I have rec'd a bone scan for further assessment and to establish a baseline of her current skeletal disease involvement. She is symptomatic with regards to the right hip which is at risk for developing a pathologic fracture. I would not recommend IMN at this stage given the lytic region in the femoral head. For this reason the best surgical option would be right hip hemiarthroplasty- a procedure which can be done electively or if she breaks at some point in the future. At this stage the main focus is on delivering chemotherapy, a treatment which would be halted if we proceed with surgery. For this reason, I think it's best to hold off on surgery for now while she gets systemic treatment, with the understanding that she will require hemiarthroplasty if she develops a fracture in the future. I have also recommended that she follow up with a general surgeon with regards to the right axillary adenopathy for further evaluation.    Onc 10/11/24: Patient is here now for cycle 3D1. She was admitted at [facility] with intractable pain. she received palliative RT. Pain level is 5-7 out of 10. She is followed by palliative. She is on Dilaudid and morphine. She is able to shuffle slowly and get around. Not able to bear weight.  She states that her appetite and fatigue has improved secondary to transient chemo break. Weight is stable. No neuropathy or fevers. I recommend to complete 2 additional cycles of Trodelvy followed by repeat scans.  She had low hemoglobin which is stable now. she has met with orthopedic oncology and is refusing pending surgical fixation. 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 Denies any SOB or palpitations. ----------------------------------------------------  Pt was initially referred to supportive oncology for symptom management. She is in a wheelchair, but usually ambulates with a walker. She describes pain located in R hip, radiates to R knee and anterior thigh. No numbness. Described as severe ache and spasms. Currently at 7/10. Dilaudid brings it down to 7/10. Last taken around 1pm today. Following a schedule with Dilaudid 4mg, taking at 1am, 7am, 1pm, 7pm. Relief lasts about 4 hours. The last 5 days, pain has significantly worsened. Heating pads provide some relief. The pain never goes below 7/10, and at this level she is unable to carry out ADLs, interact with family or socialize. She is also taking Gabapentin 300mg TID. Using Senna and Miralax. Had BM yesterday. Denies nausea, vomiting. Poor appetite due to pain. Poor sleep, laying in bed a lot and waking up due to pain. Difficult to express emotions due to the pain, remains quiet.  Social: Lives with mom (Kathryn) and dad (Henrik Chisholm), daughter (Irina Singh), and son. Moved from [place] 3 months ago. 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 She is S/p 5 fractions of radiation, ended on 10/2/24. During that admission, she was seen by palliative care team and started on Morphine ER 30mg q8h, and Dilaudid PO 4-6mg q4h PRN. She states her pain seemed to be well controlled up until 2 days ago where the pain in her hip/legs have worsened. She denies any recent trauma or fall. She is taking Dilaudid PO 4mg once in the morning and Tizanidine 2mg as needed, on average once or less per day. When she takes the Dialudid 4mg PO in the morning, she gets 1-2 hrs of relief. She was hesitant to take it any more times per day because she was concerned to mix it with her long-acting morphine. She also continues on Gabapentin 300mg TID. She continues on a bowel regimen with regular BMs. She has no other concerns today. Next chemo is this Friday.  10/21/24 Interval History: Ran out of Morphine ER 10/16. Taking Dilaudid PO 4mg 1.5 tab about 2-3x daily. She states since being off the Morphine ER, she feels she may not need to restart a long acting. She believes the radiation may be helping her pain and she doesn't need a long acting. She takes Claritin after Nulesta injections with relief for about 5 days. She is also taking Tizanidine 2mg about once daily with relief. Admits to constipation, taking Senna sporadically but now taking 3 tab at bedtime.  11/1/2024 Interval History: Since d/c the MSER she reports pain has risen, stating it rises to 10/10. She has been utilizing hydromorphone 6mg three to four times daily. She utilizes Tizanidine 2mg once to twice daily with limited relief. She states the pain lessens if she lays still. She reports pain to sacral stage III continues to offset weight, apply barrier creams and dressings. Pain from the pressure injury often wakes her at night. She is eating two meals daily, denies N/V/D. Today she presents chairside with swelling to RLE, + pedal pulses. She states she is compliant with Eliquis regimen.   11/8/2024 Interval History: Pt seen in treatment room, accompanied by daughter. She states her pain is better controlled with the resumption of MSER 30mg BID, hydromorphone 6mg PRN which she utilizes once to twice daily, tizanidine 2mg sparingly. She reports pain, in particular, to her RLE with movement. She rates the pain as 'ok' in general with exacerbations with movement, rating 10/10 on occasion. She states the hydromorphone works well in controlling these spikes in pain. She reports moderate pain to her pressure injury and reports it is 'healing nicely'. She continues to offset her weight, apply barrier cream and apply mepilex dressing regularly. She reports difficulty sleeping, no more than three hours at a time, attributes to positioning and healing pressure injury. Moderate swelling continues to DESTINEY LE, encouraged to ambulate, elevate, and maintain protein intake. She states oral intake is low, forces herself to eat. She states she has little appetite and altered taste. Defers medical cannabis at this time. Denies N/V, reports bowels are regular every 1-2 days with bowel regimen in place.     Istop Ref#: [id number]

## 2024-11-12 NOTE — DATA REVIEWED
[FreeTextEntry1] : US Hernandez MERCEDES (11/1/2024)  FINDINGS:  RIGHT: Normal compressibility of the RIGHT common femoral, femoral and popliteal veins. Doppler examination shows normal spontaneous and phasic flow. No RIGHT calf vein thrombosis is detected.  LEFT: Normal compressibility of the LEFT common femoral, femoral and popliteal veins. Doppler examination shows normal spontaneous and phasic flow. No LEFT calf vein thrombosis is detected.  IMPRESSION: No evidence of deep venous thrombosis in either lower extremity.   EXAM: [id number] - PETCT SKUL-THI ONC FDG SUBS  - ORDERED BY: GREGOR ARCNIIEGA   PROCEDURE DATE:  08/21/2024    INTERPRETATION:  CLINICAL INFORMATION: Right breast cancer, metastatic. Evaluate extent of disease.  TREATMENT STRATEGY EVALUATION: Subsequent AREA IMAGED: Skull base-to-thigh FASTING BLOOD SUGAR: 84 mg/dl RADIOPHARMACEUTICAL: 10.69 mCi F-18 FDG, I.V. I.V. SITE: hand left UPTAKE PERIOD: 56 min SCANNER: Appy Couple Gen2 ORAL CONTRAST: Omnipaque 300 PHARMACOLOGIC INTERVENTION: None.  TECHNIQUE: Following intravenous injection of above radiopharmaceutical and uptake period, PET/CT was performed. CT protocol was optimized for PET attenuation correction and anatomic localization and was not designed to produce and cannot replace state-of-the-art diagnostic CT images with specific imaging protocols for different body parts and indications. Images were reconstructed and reviewed in axial, coronal and sagittal views and three-dimensional MIP.  The standardized uptake values (SUV) are normalized to patient body weight and indicate the highest activity concentration (SUVmax) in a given site. All image numbers refer to axial image number.  COMPARISON:  None.  OTHER STUDIES USED FOR CORRELATION: CT 7/14/2024  FINDINGS:  HEAD/NECK: Physiologic FDG activity in visualized brain, head, and neck.  THORAX: Low-level FDG uptake in asymmetrical soft tissue in the right breast and cutaneous tissues. FDG avid right supraclavicular, right axillary and right subpectoral lymphadenopathy. Index nodes: Right supraclavicular, 2.5 x 1.9 cm, SUV 17 (image 59). Largest lymph node in the right axilla, 5.2 x 5.0 cm, SUV 16 (image 86).  Left Port-A-Cath with tip in the superior vena cava focal FDG uptake along the course of the catheter likely due to focal retention of radiotracer within the catheter.  LUNGS: No abnormal FDG activity. No nodule.  PLEURA/PERICARDIUM: No abnormal FDG activity. No effusion.  HEPATOBILIARY/PANCREAS: Physiologic FDG activity.  For reference, normal liver demonstrates SUV mean 2.3. Cholecystectomy.  SPLEEN: Physiologic FDG activity. Normal in size.  ADRENAL GLANDS: No abnormal FDG activity. No nodule.  KIDNEYS/URINARY BLADDER: Physiologic excreted FDG activity. Cystocele.  REPRODUCTIVE ORGANS: No abnormal FDG activity.  ABDOMINOPELVIC LYMPH NODES/RETROPERITONEUM: A few FDG avid lymph nodes are evident in the pelvis. For example, a right inguinal lymph node measures 1.6 x 1.2 cm, SUV 11 (image 253).  ESOPHAGUS/STOMACH/BOWEL/PERITONEUM/MESENTERY: No abnormal FDG activity. Bowel activity appears physiological.  VESSELS: Unremarkable.  BONES/SOFT TISSUES: Multiple FDG avid bone lesions, some of which are large, for example in the right scapula, right iliac, left iliac, left ischium, right femoral head, left distal femoral shaft, and left fibula. Index lesions: Large right scapular destructive lytic and soft tissue lesion, SUV 18 (image 82). Large destructive and soft tissue lesion in the right iliac, SUV 17 (image 224). Lytic lesion in the right femoral head, SUV 12 (image 242). FDG avid lesion of the right superior pubic ramus also shows fracture. FDG avid lesion associated with the left proximal fibula also shows fracture.   Radiopharmaceutical contamination evident posterior to the pelvis. Mild FDG uptake and soft tissue stranding in the muscles of the lower back and pelvis may relate to recent trauma.  IMPRESSION:  1. Multiple FDG avid right supraclavicular, subpectoral and axillary lymph nodes, consistent with metastases. FDG avid pelvic lymphadenopathy is also evident.  2. Multiple destructive lytic and soft tissue FDG avid bone metastases, several of which are large. Pathologic fractures in the right superior pubic ramus and left proximal fibula.  3. Mildly asymmetrical FDG uptake in the right breast and skin.  Report emailed via secure email to Dr. Gregor Arciniega at time of interpretation.  --- End of Report ---

## 2024-11-12 NOTE — PHYSICAL EXAM
[General Appearance - Alert] : alert [General Appearance - Well Nourished] : well nourished [General Appearance - Well Developed] : well developed [Sclera] : the sclera and conjunctiva were normal [Extraocular Movements] : extraocular movements were intact [Outer Ear] : the ears and nose were normal in appearance [Hearing Threshold Finger Rub Not [place]] : hearing was normal [] : no respiratory distress [Respiration, Rhythm And Depth] : normal respiratory rhythm and effort [Auscultation Breath Sounds / Voice Sounds] : lungs were clear to auscultation bilaterally [Heart Rate And Rhythm] : heart rate was normal and rhythm regular [Heart Sounds] : normal S1 and S2 [Full Pulse] : the pedal pulses are present [Bowel Sounds] : normal bowel sounds [Abdomen Soft] : soft [Abdomen Tenderness] : non-tender [No Focal Deficits] : no focal deficits [Oriented To Time, Place, And Person] : oriented to person, place, and time [Impaired Insight] : insight and judgment were intact [Affect] : the affect was normal [Mood] : the mood was normal [FreeTextEntry1] : TTP R thigh and hip. Limited ROM due to discomfort

## 2024-11-12 NOTE — ASSESSMENT
[FreeTextEntry1] : 62yo F with:  # Metastatic triple negative breast cancer - Primary areas involved are the right femoral neck/head, R ilium, L posterior acetabulum, L ischium, L femoral shaft, and R scapula - S/p radiation while at [facility] - On chemotherapy  # Pain - Continue MSER 30mg BID - Continue Dilaudid PO 6mg - using 3-4 times daily - Continue tizanidine 2mg, taking about once to twice tablet daily - Counseled on importance of maintaining bowel regularity in light of regular opioid use.   # B/l lower extremity Neuropathy  - Gabapentin 300mg TID  # Constipation - Has not been regimented with medication - Has chronic constipation - Instructed to take Senna two tab twice daily and Miralax twice daily  # Debility - Pt is physically limited due to malignancy and its sequelae  - Getting home care services  #LE Edema - DESTINEY LE Doppler US ordered R/O DVT -Remains on Eliquis 5mg daily for hx clot to port  # Encounter for Palliative Care - Explained the role of palliative care in enhancing quality of life in the setting of serious illness. Emotional support provided.     Follow up in 2 weeks. Instructed to call office with any questions or concerns.

## 2024-11-12 NOTE — HISTORY OF PRESENT ILLNESS
[FreeTextEntry1] : 60yo F presents for follow up, referred by oncology.  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   Oncology hx: She presented with a right axillary mass and arm swelling in July 2023. She went to an emergency room in [place] and diagnosed with metastatic triple negative breast cancer. She received palliative Taxol and pembrolizumab in July 2023. She underwent a course of palliative radiation therapy to the lower spine, completing treatment in September 2023. Progression of disease was noted in January 2024, so treatment was switched to carboplatin gemcitabine and pembrolizumab in February 2024.  In May 2024, she relocated from [place] to [place]. She has resumed treatment under the care of Dr. Arciniega.  CT angiogram on 7/14/24 showed extensive right axillary and supraclavicular adenopathy measuring up to 4.9 cm. CT abdomen and pelvis showed multiple lytic bone lesions involving L5, the sacrum, pelvic bones and right femoral head.  PET/CT on 8/21/24 showed avid right supraclavicular, subpectoral, and axillary lymph nodes. Multiple lytic and soft tissue avid bone metastases were present associated with pathological fractures of the right superior pubic ramus and left proximal fibula. There were multiple lesions of the right breast and skin.  She started treatment with Trodelvy on 8/23/24.  [facility] visit 9/4: She is having pain from her waist down since March 2024. The pain is most severe in the right hip region and the left lower leg. The pain is worse with weight bearing. She cannot walk without assistance. She is also having pain in her right shoulder, clavicle region when she moves her arm a certain way. She is taking hydromorphone 4mg every 6 hours and gabapentin 300mg tid, with some temporary relief. She will meet with orthopedic surgery, Dr. Lauren, next week.  Ortho visit 9/11: 61F w/ multifocal skeletal metastatic breast ca to bone. The primary areas involved are the right femoral neck/head, R ilium, L posterior acetabulum, L ischium, L femoral shaft, and R scapula. The current PET was not a full body study therefore I have rec'd a bone scan for further assessment and to establish a baseline of her current skeletal disease involvement. She is symptomatic with regards to the right hip which is at risk for developing a pathologic fracture. I would not recommend IMN at this stage given the lytic region in the femoral head. For this reason the best surgical option would be right hip hemiarthroplasty- a procedure which can be done electively or if she breaks at some point in the future. At this stage the main focus is on delivering chemotherapy, a treatment which would be halted if we proceed with surgery. For this reason, I think it's best to hold off on surgery for now while she gets systemic treatment, with the understanding that she will require hemiarthroplasty if she develops a fracture in the future. I have also recommended that she follow up with a general surgeon with regards to the right axillary adenopathy for further evaluation.    Onc 10/11/24: Patient is here now for cycle 3D1. She was admitted at [facility] with intractable pain. she received palliative RT. Pain level is 5-7 out of 10. She is followed by palliative. She is on Dilaudid and morphine. She is able to shuffle slowly and get around. Not able to bear weight.  She states that her appetite and fatigue has improved secondary to transient chemo break. Weight is stable. No neuropathy or fevers. I recommend to complete 2 additional cycles of Trodelvy followed by repeat scans.  She had low hemoglobin which is stable now. she has met with orthopedic oncology and is refusing pending surgical fixation. 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 Denies any SOB or palpitations. ----------------------------------------------------  Pt was initially referred to supportive oncology for symptom management. She is in a wheelchair, but usually ambulates with a walker. She describes pain located in R hip, radiates to R knee and anterior thigh. No numbness. Described as severe ache and spasms. Currently at 7/10. Dilaudid brings it down to 7/10. Last taken around 1pm today. Following a schedule with Dilaudid 4mg, taking at 1am, 7am, 1pm, 7pm. Relief lasts about 4 hours. The last 5 days, pain has significantly worsened. Heating pads provide some relief. The pain never goes below 7/10, and at this level she is unable to carry out ADLs, interact with family or socialize. She is also taking Gabapentin 300mg TID. Using Senna and Miralax. Had BM yesterday. Denies nausea, vomiting. Poor appetite due to pain. Poor sleep, laying in bed a lot and waking up due to pain. Difficult to express emotions due to the pain, remains quiet.  Social: Lives with mom (Kathryn) and dad (Henrik Chisholm), daughter (Irina Singh), and son. Moved from [place] 3 months ago. Worked as a nurse in [place] Advance Directives / Decision Makers: Father and daughter help with medical decisions  10/14/24 Interval History: She was recently at [facility] for uncontrolled pain and radiation treatment. She is S/p 5 fractions of radiation, ended on 10/2/24. During that admission, she was seen by palliative care team and started on Morphine ER 30mg q8h, and Dilaudid PO 4-6mg q4h PRN. She states her pain seemed to be well controlled up until 2 days ago where the pain in her hip/legs have worsened. She denies any recent trauma or fall. She is taking Dilaudid PO 4mg once in the morning and Tizanidine 2mg as needed, on average once or less per day. When she takes the Dialudid 4mg PO in the morning, she gets 1-2 hrs of relief. She was hesitant to take it any more times per day because she was concerned to mix it with her long-acting morphine. She also continues on Gabapentin 300mg TID. She continues on a bowel regimen with regular BMs. She has no other concerns today. Next chemo is this Friday.  10/21/24 Interval History: Ran out of Morphine ER 10/16. Taking Dilaudid PO 4mg 1.5 tab about 2-3x daily. She states since being off the Morphine ER, she feels she may not need to restart a long acting. She believes the radiation may be helping her pain and she doesn't need a long acting. She takes Claritin after Nulesta injections with relief for about 5 days. She is also taking Tizanidine 2mg about once daily with relief. Admits to constipation, taking Senna sporadically but now taking 3 tab at bedtime.  11/1/2024 Interval History: Since d/c the MSER she reports pain has risen, stating it rises to 10/10. She has been utilizing hydromorphone 6mg three to four times daily. She utilizes Tizanidine 2mg once to twice daily with limited relief. She states the pain lessens if she lays still. She reports pain to sacral stage III continues to offset weight, apply barrier creams and dressings. Pain from the pressure injury often wakes her at night. She is eating two meals daily, denies N/V/D. Today she presents chairside with swelling to RLE, + pedal pulses. She states she is compliant with Eliquis regimen.   11/8/2024 Interval History: Pt seen in treatment room, accompanied by daughter. She states her pain is better controlled with the resumption of MSER 30mg BID, hydromorphone 6mg PRN which she utilizes once to twice daily, tizanidine 2mg sparingly. She reports pain, in particular, to her RLE with movement. She rates the pain as 'ok' in general with exacerbations with movement, rating 10/10 on occasion. She states the hydromorphone works well in controlling these spikes in pain. She reports moderate pain to her pressure injury and reports it is 'healing nicely'. She continues to offset her weight, apply barrier cream and apply mepilex dressing regularly. She reports difficulty sleeping, no more than three hours at a time, attributes to positioning and healing pressure injury. Moderate swelling continues to DESTINEY LE, encouraged to ambulate, elevate, and maintain protein intake. She states oral intake is low, forces herself to eat. She states she has little appetite and altered taste. Defers medical cannabis at this time. Denies N/V, reports bowels are regular every 1-2 days with bowel regimen in place.     Istop Ref#: [id number]

## 2024-11-12 NOTE — ASSESSMENT
[FreeTextEntry1] : 62yo F with:  # Metastatic triple negative breast cancer - Primary areas involved are the right femoral neck/head, R ilium, L posterior acetabulum, L ischium, L femoral shaft, and R scapula - S/p radiation while at [facility] - On chemotherapy  # Pain - Continue MSER 30mg BID - Continue Dilaudid PO 6mg - using 3-4 times daily - Continue tizanidine 2mg, taking about once to twice tablet daily - Counseled on importance of maintaining bowel regularity in light of regular opioid use.   # B/l lower extremity Neuropathy  - Gabapentin 300mg TID  # Constipation - Has not been regimented with medication - Has chronic constipation - Instructed to take Senna two tab twice daily and Miralax twice daily  # Debility - Pt is physically limited due to malignancy and its sequelae  - Getting home care services  #LE Edema - DESTINEY LE Doppler US ordered R/O DVT -Remains on Eliquis 5mg daily for hx clot to port  # Encounter for Palliative Care - Explained the role of palliative care in enhancing quality of life in the setting of serious illness. Emotional support provided.     Follow up in 2 weeks. Instructed to call office with any questions or concerns.

## 2024-11-12 NOTE — ASSESSMENT
[FreeTextEntry1] : 60yo F with:  # Metastatic triple negative breast cancer - Primary areas involved are the right femoral neck/head, R ilium, L posterior acetabulum, L ischium, L femoral shaft, and R scapula - S/p radiation while at [facility] - On chemotherapy  # Pain - Continue MSER 30mg BID - Continue Dilaudid PO 6mg - using 3-4 times daily - Continue tizanidine 2mg, taking about once to twice tablet daily - Counseled on importance of maintaining bowel regularity in light of regular opioid use.   # B/l lower extremity Neuropathy  - Gabapentin 300mg TID  # Constipation - Has not been regimented with medication - Has chronic constipation - Instructed to take Senna two tab twice daily and Miralax twice daily  # Debility - Pt is physically limited due to malignancy and its sequelae  - Getting home care services  #LE Edema - DESTINEY LE Doppler US ordered R/O DVT -Remains on Eliquis 5mg daily for hx clot to port  # Encounter for Palliative Care - Explained the role of palliative care in enhancing quality of life in the setting of serious illness. Emotional support provided.     Follow up in 2 weeks. Instructed to call office with any questions or concerns.

## 2024-11-12 NOTE — ASSESSMENT
[FreeTextEntry1] : 62yo F with:  # Metastatic triple negative breast cancer - Primary areas involved are the right femoral neck/head, R ilium, L posterior acetabulum, L ischium, L femoral shaft, and R scapula - S/p radiation while at [facility] - On chemotherapy  # Pain - Continue MSER 30mg BID - Continue Dilaudid PO 6mg - using 3-4 times daily - Continue tizanidine 2mg, taking about once to twice tablet daily - Counseled on importance of maintaining bowel regularity in light of regular opioid use.   # B/l lower extremity Neuropathy  - Gabapentin 300mg TID  # Constipation - Has not been regimented with medication - Has chronic constipation - Instructed to take Senna two tab twice daily and Miralax twice daily  # Debility - Pt is physically limited due to malignancy and its sequelae  - Getting home care services  #LE Edema - DESTINEY LE Doppler US ordered R/O DVT -Remains on Eliquis 5mg daily for hx clot to port  # Encounter for Palliative Care - Explained the role of palliative care in enhancing quality of life in the setting of serious illness. Emotional support provided.     Follow up in 2 weeks. Instructed to call office with any questions or concerns.

## 2024-11-12 NOTE — HISTORY OF PRESENT ILLNESS
[FreeTextEntry1] : 60yo F presents for follow up, referred by oncology.  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   Oncology hx: She presented with a right axillary mass and arm swelling in July 2023. She went to an emergency room in [place] and diagnosed with metastatic triple negative breast cancer. She received palliative Taxol and pembrolizumab in July 2023. She underwent a course of palliative radiation therapy to the lower spine, completing treatment in September 2023. Progression of disease was noted in January 2024, so treatment was switched to carboplatin gemcitabine and pembrolizumab in February 2024.  In May 2024, she relocated from [place] to [place]. She has resumed treatment under the care of Dr. Arciniega.  CT angiogram on 7/14/24 showed extensive right axillary and supraclavicular adenopathy measuring up to 4.9 cm. CT abdomen and pelvis showed multiple lytic bone lesions involving L5, the sacrum, pelvic bones and right femoral head.  PET/CT on 8/21/24 showed avid right supraclavicular, subpectoral, and axillary lymph nodes. Multiple lytic and soft tissue avid bone metastases were present associated with pathological fractures of the right superior pubic ramus and left proximal fibula. There were multiple lesions of the right breast and skin.  She started treatment with Trodelvy on 8/23/24.  [facility] visit 9/4: She is having pain from her waist down since March 2024. The pain is most severe in the right hip region and the left lower leg. The pain is worse with weight bearing. She cannot walk without assistance. She is also having pain in her right shoulder, clavicle region when she moves her arm a certain way. She is taking hydromorphone 4mg every 6 hours and gabapentin 300mg tid, with some temporary relief. She will meet with orthopedic surgery, Dr. Lauren, next week.  Ortho visit 9/11: 61F w/ multifocal skeletal metastatic breast ca to bone. The primary areas involved are the right femoral neck/head, R ilium, L posterior acetabulum, L ischium, L femoral shaft, and R scapula. The current PET was not a full body study therefore I have rec'd a bone scan for further assessment and to establish a baseline of her current skeletal disease involvement. She is symptomatic with regards to the right hip which is at risk for developing a pathologic fracture. I would not recommend IMN at this stage given the lytic region in the femoral head. For this reason the best surgical option would be right hip hemiarthroplasty- a procedure which can be done electively or if she breaks at some point in the future. At this stage the main focus is on delivering chemotherapy, a treatment which would be halted if we proceed with surgery. For this reason, I think it's best to hold off on surgery for now while she gets systemic treatment, with the understanding that she will require hemiarthroplasty if she develops a fracture in the future. I have also recommended that she follow up with a general surgeon with regards to the right axillary adenopathy for further evaluation.    Onc 10/11/24: Patient is here now for cycle 3D1. She was admitted at [facility] with intractable pain. she received palliative RT. Pain level is 5-7 out of 10. She is followed by palliative. She is on Dilaudid and morphine. She is able to shuffle slowly and get around. Not able to bear weight.  She states that her appetite and fatigue has improved secondary to transient chemo break. Weight is stable. No neuropathy or fevers. I recommend to complete 2 additional cycles of Trodelvy followed by repeat scans.  She had low hemoglobin which is stable now. she has met with orthopedic oncology and is refusing pending surgical fixation. 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 Denies any SOB or palpitations. ----------------------------------------------------  Pt was initially referred to supportive oncology for symptom management. She is in a wheelchair, but usually ambulates with a walker. She describes pain located in R hip, radiates to R knee and anterior thigh. No numbness. Described as severe ache and spasms. Currently at 7/10. Dilaudid brings it down to 7/10. Last taken around 1pm today. Following a schedule with Dilaudid 4mg, taking at 1am, 7am, 1pm, 7pm. Relief lasts about 4 hours. The last 5 days, pain has significantly worsened. Heating pads provide some relief. The pain never goes below 7/10, and at this level she is unable to carry out ADLs, interact with family or socialize. She is also taking Gabapentin 300mg TID. Using Senna and Miralax. Had BM yesterday. Denies nausea, vomiting. Poor appetite due to pain. Poor sleep, laying in bed a lot and waking up due to pain. Difficult to express emotions due to the pain, remains quiet.  Social: Lives with mom (Kathryn) and dad (Henrik Chisholm), daughter (Irina Singh), and son. Moved from [place] 3 months ago. 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 She is S/p 5 fractions of radiation, ended on 10/2/24. During that admission, she was seen by palliative care team and started on Morphine ER 30mg q8h, and Dilaudid PO 4-6mg q4h PRN. She states her pain seemed to be well controlled up until 2 days ago where the pain in her hip/legs have worsened. She denies any recent trauma or fall. She is taking Dilaudid PO 4mg once in the morning and Tizanidine 2mg as needed, on average once or less per day. When she takes the Dialudid 4mg PO in the morning, she gets 1-2 hrs of relief. She was hesitant to take it any more times per day because she was concerned to mix it with her long-acting morphine. She also continues on Gabapentin 300mg TID. She continues on a bowel regimen with regular BMs. She has no other concerns today. Next chemo is this Friday.  10/21/24 Interval History: Ran out of Morphine ER 10/16. Taking Dilaudid PO 4mg 1.5 tab about 2-3x daily. She states since being off the Morphine ER, she feels she may not need to restart a long acting. She believes the radiation may be helping her pain and she doesn't need a long acting. She takes Claritin after Nulesta injections with relief for about 5 days. She is also taking Tizanidine 2mg about once daily with relief. Admits to constipation, taking Senna sporadically but now taking 3 tab at bedtime.  11/1/2024 Interval History: Since d/c the MSER she reports pain has risen, stating it rises to 10/10. She has been utilizing hydromorphone 6mg three to four times daily. She utilizes Tizanidine 2mg once to twice daily with limited relief. She states the pain lessens if she lays still. She reports pain to sacral stage III continues to offset weight, apply barrier creams and dressings. Pain from the pressure injury often wakes her at night. She is eating two meals daily, denies N/V/D. Today she presents chairside with swelling to RLE, + pedal pulses. She states she is compliant with Eliquis regimen.   11/8/2024 Interval History: Pt seen in treatment room, accompanied by daughter. She states her pain is better controlled with the resumption of MSER 30mg BID, hydromorphone 6mg PRN which she utilizes once to twice daily, tizanidine 2mg sparingly. She reports pain, in particular, to her RLE with movement. She rates the pain as 'ok' in general with exacerbations with movement, rating 10/10 on occasion. She states the hydromorphone works well in controlling these spikes in pain. She reports moderate pain to her pressure injury and reports it is 'healing nicely'. She continues to offset her weight, apply barrier cream and apply mepilex dressing regularly. She reports difficulty sleeping, no more than three hours at a time, attributes to positioning and healing pressure injury. Moderate swelling continues to DESTINEY LE, encouraged to ambulate, elevate, and maintain protein intake. She states oral intake is low, forces herself to eat. She states she has little appetite and altered taste. Defers medical cannabis at this time. Denies N/V, reports bowels are regular every 1-2 days with bowel regimen in place.     Istop Ref#: [id number]

## 2024-11-12 NOTE — DATA REVIEWED
[FreeTextEntry1] : US Hernandez MERCEDES (11/1/2024)  FINDINGS:  RIGHT: Normal compressibility of the RIGHT common femoral, femoral and popliteal veins. Doppler examination shows normal spontaneous and phasic flow. No RIGHT calf vein thrombosis is detected.  LEFT: Normal compressibility of the LEFT common femoral, femoral and popliteal veins. Doppler examination shows normal spontaneous and phasic flow. No LEFT calf vein thrombosis is detected.  IMPRESSION: No evidence of deep venous thrombosis in either lower extremity.   EXAM: [id number] - PETCT SKUL-THI ONC FDG SUBS  - ORDERED BY: GREGOR ARCINIEGA   PROCEDURE DATE:  08/21/2024    INTERPRETATION:  CLINICAL INFORMATION: Right breast cancer, metastatic. Evaluate extent of disease.  TREATMENT STRATEGY EVALUATION: Subsequent AREA IMAGED: Skull base-to-thigh FASTING BLOOD SUGAR: 84 mg/dl RADIOPHARMACEUTICAL: 10.69 mCi F-18 FDG, I.V. I.V. SITE: hand left UPTAKE PERIOD: 56 min SCANNER: [employer] Gen2 ORAL CONTRAST: Omnipaque 300 PHARMACOLOGIC INTERVENTION: None.  TECHNIQUE: Following intravenous injection of above radiopharmaceutical and uptake period, PET/CT was performed. CT protocol was optimized for PET attenuation correction and anatomic localization and was not designed to produce and cannot replace state-of-the-art diagnostic CT images with specific imaging protocols for different body parts and indications. Images were reconstructed and reviewed in axial, coronal and sagittal views and three-dimensional MIP.  The standardized uptake values (SUV) are normalized to patient body weight and indicate the highest activity concentration (SUVmax) in a given site. All image numbers refer to axial image number.  COMPARISON:  None.  OTHER STUDIES USED FOR CORRELATION: CT 7/14/2024  FINDINGS:  HEAD/NECK: Physiologic FDG activity in visualized brain, head, and neck.  THORAX: Low-level FDG uptake in asymmetrical soft tissue in the right breast and cutaneous tissues. FDG avid right supraclavicular, right axillary and right subpectoral lymphadenopathy. Index nodes: Right supraclavicular, 2.5 x 1.9 cm, SUV 17 (image 59). Largest lymph node in the right axilla, 5.2 x 5.0 cm, SUV 16 (image 86).  Left Port-A-Cath with tip in the superior vena cava focal FDG uptake along the course of the catheter likely due to focal retention of radiotracer within the catheter.  LUNGS: No abnormal FDG activity. No nodule.  PLEURA/PERICARDIUM: No abnormal FDG activity. No effusion.  HEPATOBILIARY/PANCREAS: Physiologic FDG activity.  For reference, normal liver demonstrates SUV mean 2.3. Cholecystectomy.  SPLEEN: Physiologic FDG activity. Normal in size.  ADRENAL GLANDS: No abnormal FDG activity. No nodule.  KIDNEYS/URINARY BLADDER: Physiologic excreted FDG activity. Cystocele.  REPRODUCTIVE ORGANS: No abnormal FDG activity.  ABDOMINOPELVIC LYMPH NODES/RETROPERITONEUM: A few FDG avid lymph nodes are evident in the pelvis. For example, a right inguinal lymph node measures 1.6 x 1.2 cm, SUV 11 (image 253).  ESOPHAGUS/STOMACH/BOWEL/PERITONEUM/MESENTERY: No abnormal FDG activity. Bowel activity appears physiological.  VESSELS: Unremarkable.  BONES/SOFT TISSUES: Multiple FDG avid bone lesions, some of which are large, for example in the right scapula, right iliac, left iliac, left ischium, right femoral head, left distal femoral shaft, and left fibula. Index lesions: Large right scapular destructive lytic and soft tissue lesion, SUV 18 (image 82). Large destructive and soft tissue lesion in the right iliac, SUV 17 (image 224). Lytic lesion in the right femoral head, SUV 12 (image 242). FDG avid lesion of the right superior pubic ramus also shows fracture. FDG avid lesion associated with the left proximal fibula also shows fracture.   Radiopharmaceutical contamination evident posterior to the pelvis. Mild FDG uptake and soft tissue stranding in the muscles of the lower back and pelvis may relate to recent trauma.  IMPRESSION:  1. Multiple FDG avid right supraclavicular, subpectoral and axillary lymph nodes, consistent with metastases. FDG avid pelvic lymphadenopathy is also evident.  2. Multiple destructive lytic and soft tissue FDG avid bone metastases, several of which are large. Pathologic fractures in the right superior pubic ramus and left proximal fibula.  3. Mildly asymmetrical FDG uptake in the right breast and skin.  Report emailed via secure email to Dr. Gregor Arciniega at time of interpretation.  --- End of Report ---

## 2024-11-25 NOTE — HISTORY OF PRESENT ILLNESS
[FreeTextEntry1] : 60yo F presents for follow up, referred by oncology.  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   Oncology hx: She presented with a right axillary mass and arm swelling in July 2023. She went to an emergency room in [place] and diagnosed with metastatic triple negative breast cancer. She received palliative Taxol and pembrolizumab in July 2023. She underwent a course of palliative radiation therapy to the lower spine, completing treatment in September 2023. Progression of disease was noted in January 2024, so treatment was switched to carboplatin gemcitabine and pembrolizumab in February 2024.  In May 2024, she relocated from [place] to [place]. She has resumed treatment under the care of Dr. Arciniega.  CT angiogram on 7/14/24 showed extensive right axillary and supraclavicular adenopathy measuring up to 4.9 cm. CT abdomen and pelvis showed multiple lytic bone lesions involving L5, the sacrum, pelvic bones and right femoral head.  PET/CT on 8/21/24 showed avid right supraclavicular, subpectoral, and axillary lymph nodes. Multiple lytic and soft tissue avid bone metastases were present associated with pathological fractures of the right superior pubic ramus and left proximal fibula. There were multiple lesions of the right breast and skin.  She started treatment with Trodelvy on 8/23/24.  [facility] visit 9/4: She is having pain from her waist down since March 2024. The pain is most severe in the right hip region and the left lower leg. The pain is worse with weight bearing. She cannot walk without assistance. She is also having pain in her right shoulder, clavicle region when she moves her arm a certain way. She is taking hydromorphone 4mg every 6 hours and gabapentin 300mg tid, with some temporary relief. She will meet with orthopedic surgery, Dr. Lauren, next week.  Ortho visit 9/11: 61F w/ multifocal skeletal metastatic breast ca to bone. The primary areas involved are the right femoral neck/head, R ilium, L posterior acetabulum, L ischium, L femoral shaft, and R scapula. The current PET was not a full body study therefore I have rec'd a bone scan for further assessment and to establish a baseline of her current skeletal disease involvement. She is symptomatic with regards to the right hip which is at risk for developing a pathologic fracture. I would not recommend IMN at this stage given the lytic region in the femoral head. For this reason the best surgical option would be right hip hemiarthroplasty- a procedure which can be done electively or if she breaks at some point in the future. At this stage the main focus is on delivering chemotherapy, a treatment which would be halted if we proceed with surgery. For this reason, I think it's best to hold off on surgery for now while she gets systemic treatment, with the understanding that she will require hemiarthroplasty if she develops a fracture in the future. I have also recommended that she follow up with a general surgeon with regards to the right axillary adenopathy for further evaluation.    Onc 10/11/24: Patient is here now for cycle 3D1. She was admitted at [facility] with intractable pain. she received palliative RT. Pain level is 5-7 out of 10. She is followed by palliative. She is on Dilaudid and morphine. She is able to shuffle slowly and get around. Not able to bear weight.  She states that her appetite and fatigue has improved secondary to transient chemo break. Weight is stable. No neuropathy or fevers. I recommend to complete 2 additional cycles of Trodelvy followed by repeat scans.  She had low hemoglobin which is stable now. she has met with orthopedic oncology and is refusing pending surgical fixation. 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 Denies any SOB or palpitations. ----------------------------------------------------  Pt was initially referred to supportive oncology for symptom management. She is in a wheelchair, but usually ambulates with a walker. She describes pain located in R hip, radiates to R knee and anterior thigh. No numbness. Described as severe ache and spasms. Currently at 7/10. Dilaudid brings it down to 7/10. Last taken around 1pm today. Following a schedule with Dilaudid 4mg, taking at 1am, 7am, 1pm, 7pm. Relief lasts about 4 hours. The last 5 days, pain has significantly worsened. Heating pads provide some relief. The pain never goes below 7/10, and at this level she is unable to carry out ADLs, interact with family or socialize. She is also taking Gabapentin 300mg TID. Using Senna and Miralax. Had BM yesterday. Denies nausea, vomiting. Poor appetite due to pain. Poor sleep, laying in bed a lot and waking up due to pain. Difficult to express emotions due to the pain, remains quiet.  Social: Lives with mom (Kathryn) and dad (Henrik Chisholm), daughter (Irina Singh), and son. Moved from [place] 3 months ago. 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 She is S/p 5 fractions of radiation, ended on 10/2/24. During that admission, she was seen by palliative care team and started on Morphine ER 30mg q8h, and Dilaudid PO 4-6mg q4h PRN. She states her pain seemed to be well controlled up until 2 days ago where the pain in her hip/legs have worsened. She denies any recent trauma or fall. She is taking Dilaudid PO 4mg once in the morning and Tizanidine 2mg as needed, on average once or less per day. When she takes the Dialudid 4mg PO in the morning, she gets 1-2 hrs of relief. She was hesitant to take it any more times per day because she was concerned to mix it with her long-acting morphine. She also continues on Gabapentin 300mg TID. She continues on a bowel regimen with regular BMs. She has no other concerns today. Next chemo is this Friday.  10/21/24 Interval History: Ran out of Morphine ER 10/16. Taking Dilaudid PO 4mg 1.5 tab about 2-3x daily. She states since being off the Morphine ER, she feels she may not need to restart a long acting. She believes the radiation may be helping her pain and she doesn't need a long acting. She takes Claritin after Nulesta injections with relief for about 5 days. She is also taking Tizanidine 2mg about once daily with relief. Admits to constipation, taking Senna sporadically but now taking 3 tab at bedtime.  11/1/2024 Interval History: Since d/c the MSER she reports pain has risen, stating it rises to 10/10. She has been utilizing hydromorphone 6mg three to four times daily. She utilizes Tizanidine 2mg once to twice daily with limited relief. She states the pain lessens if she lays still. She reports pain to sacral stage III continues to offset weight, apply barrier creams and dressings. Pain from the pressure injury often wakes her at night. She is eating two meals daily, denies N/V/D. Today she presents chairside with swelling to RLE, + pedal pulses. She states she is compliant with Eliquis regimen.   11/8/2024 Interval History: Pt seen in treatment room, accompanied by daughter. She states her pain is better controlled with the resumption of MSER 30mg BID, hydromorphone 6mg PRN which she utilizes once to twice daily, tizanidine 2mg sparingly. She reports pain, in particular, to her RLE with movement. She rates the pain as 'ok' in general with exacerbations with movement, rating 10/10 on occasion. She states the hydromorphone works well in controlling these spikes in pain. She reports moderate pain to her pressure injury and reports it is 'healing nicely'. She continues to offset her weight, apply barrier cream and apply mepilex dressing regularly. She reports difficulty sleeping, no more than three hours at a time, attributes to positioning and healing pressure injury. Moderate swelling continues to DESTINEY LE, encouraged to ambulate, elevate, and maintain protein intake. She states oral intake is low, forces herself to eat. She states she has little appetite and altered taste. Defers medical cannabis at this time. Denies N/V, reports bowels are regular every 1-2 days with bowel regimen in place.   11/22/ Interval History: Pt seen in treatment room, accompanied by her daughter.  Recent PET revealed mixed response noting areas of POD. She states there will be an alteration to her chemotherapy regimen, unsure at this time. She c/w MSER 30mg BID and utilizing hydromorphone 6mg twice daily. She describes the pain often as an intermittent cramping pain to the RLE, relieved by pain regimen and tizanidine 2mg which she reports as most helpful for cramps. RLE continues to be edematous, will begin furosemide.  She continues w in home PT with passive exercises, she is unable to bear weight at this time. States pressure ulcer is healed at this time, continues to offset weight as often as possible. Appetite remains stable, bowels are regular. Mood is stable and positive.   I Stop Ref#: [id number]

## 2024-11-25 NOTE — HISTORY OF PRESENT ILLNESS
[FreeTextEntry1] : 62yo F presents for follow up, referred by oncology.  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   Oncology hx: She presented with a right axillary mass and arm swelling in July 2023. She went to an emergency room in [place] and diagnosed with metastatic triple negative breast cancer. She received palliative Taxol and pembrolizumab in July 2023. She underwent a course of palliative radiation therapy to the lower spine, completing treatment in September 2023. Progression of disease was noted in January 2024, so treatment was switched to carboplatin gemcitabine and pembrolizumab in February 2024.  In May 2024, she relocated from [place] to [place]. She has resumed treatment under the care of Dr. Arciniega.  CT angiogram on 7/14/24 showed extensive right axillary and supraclavicular adenopathy measuring up to 4.9 cm. CT abdomen and pelvis showed multiple lytic bone lesions involving L5, the sacrum, pelvic bones and right femoral head.  PET/CT on 8/21/24 showed avid right supraclavicular, subpectoral, and axillary lymph nodes. Multiple lytic and soft tissue avid bone metastases were present associated with pathological fractures of the right superior pubic ramus and left proximal fibula. There were multiple lesions of the right breast and skin.  She started treatment with Trodelvy on 8/23/24.  [facility] visit 9/4: She is having pain from her waist down since March 2024. The pain is most severe in the right hip region and the left lower leg. The pain is worse with weight bearing. She cannot walk without assistance. She is also having pain in her right shoulder, clavicle region when she moves her arm a certain way. She is taking hydromorphone 4mg every 6 hours and gabapentin 300mg tid, with some temporary relief. She will meet with orthopedic surgery, Dr. Lauren, next week.  Ortho visit 9/11: 61F w/ multifocal skeletal metastatic breast ca to bone. The primary areas involved are the right femoral neck/head, R ilium, L posterior acetabulum, L ischium, L femoral shaft, and R scapula. The current PET was not a full body study therefore I have rec'd a bone scan for further assessment and to establish a baseline of her current skeletal disease involvement. She is symptomatic with regards to the right hip which is at risk for developing a pathologic fracture. I would not recommend IMN at this stage given the lytic region in the femoral head. For this reason the best surgical option would be right hip hemiarthroplasty- a procedure which can be done electively or if she breaks at some point in the future. At this stage the main focus is on delivering chemotherapy, a treatment which would be halted if we proceed with surgery. For this reason, I think it's best to hold off on surgery for now while she gets systemic treatment, with the understanding that she will require hemiarthroplasty if she develops a fracture in the future. I have also recommended that she follow up with a general surgeon with regards to the right axillary adenopathy for further evaluation.    Onc 10/11/24: Patient is here now for cycle 3D1. She was admitted at [facility] with intractable pain. she received palliative RT. Pain level is 5-7 out of 10. She is followed by palliative. She is on Dilaudid and morphine. She is able to shuffle slowly and get around. Not able to bear weight.  She states that her appetite and fatigue has improved secondary to transient chemo break. Weight is stable. No neuropathy or fevers. I recommend to complete 2 additional cycles of Trodelvy followed by repeat scans.  She had low hemoglobin which is stable now. she has met with orthopedic oncology and is refusing pending surgical fixation. 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 Denies any SOB or palpitations. ----------------------------------------------------  Pt was initially referred to supportive oncology for symptom management. She is in a wheelchair, but usually ambulates with a walker. She describes pain located in R hip, radiates to R knee and anterior thigh. No numbness. Described as severe ache and spasms. Currently at 7/10. Dilaudid brings it down to 7/10. Last taken around 1pm today. Following a schedule with Dilaudid 4mg, taking at 1am, 7am, 1pm, 7pm. Relief lasts about 4 hours. The last 5 days, pain has significantly worsened. Heating pads provide some relief. The pain never goes below 7/10, and at this level she is unable to carry out ADLs, interact with family or socialize. She is also taking Gabapentin 300mg TID. Using Senna and Miralax. Had BM yesterday. Denies nausea, vomiting. Poor appetite due to pain. Poor sleep, laying in bed a lot and waking up due to pain. Difficult to express emotions due to the pain, remains quiet.  Social: Lives with mom (Kathryn) and dad (Henrik Chisholm), daughter (Irina Singh), and son. Moved from [place] 3 months ago. Worked as a nurse in [place] Advance Directives / Decision Makers: Father and daughter help with medical decisions  10/14/24 Interval History: She was recently at [facility] for uncontrolled pain and radiation treatment. She is S/p 5 fractions of radiation, ended on 10/2/24. During that admission, she was seen by palliative care team and started on Morphine ER 30mg q8h, and Dilaudid PO 4-6mg q4h PRN. She states her pain seemed to be well controlled up until 2 days ago where the pain in her hip/legs have worsened. She denies any recent trauma or fall. She is taking Dilaudid PO 4mg once in the morning and Tizanidine 2mg as needed, on average once or less per day. When she takes the Dialudid 4mg PO in the morning, she gets 1-2 hrs of relief. She was hesitant to take it any more times per day because she was concerned to mix it with her long-acting morphine. She also continues on Gabapentin 300mg TID. She continues on a bowel regimen with regular BMs. She has no other concerns today. Next chemo is this Friday.  10/21/24 Interval History: Ran out of Morphine ER 10/16. Taking Dilaudid PO 4mg 1.5 tab about 2-3x daily. She states since being off the Morphine ER, she feels she may not need to restart a long acting. She believes the radiation may be helping her pain and she doesn't need a long acting. She takes Claritin after Nulesta injections with relief for about 5 days. She is also taking Tizanidine 2mg about once daily with relief. Admits to constipation, taking Senna sporadically but now taking 3 tab at bedtime.  11/1/2024 Interval History: Since d/c the MSER she reports pain has risen, stating it rises to 10/10. She has been utilizing hydromorphone 6mg three to four times daily. She utilizes Tizanidine 2mg once to twice daily with limited relief. She states the pain lessens if she lays still. She reports pain to sacral stage III continues to offset weight, apply barrier creams and dressings. Pain from the pressure injury often wakes her at night. She is eating two meals daily, denies N/V/D. Today she presents chairside with swelling to RLE, + pedal pulses. She states she is compliant with Eliquis regimen.   11/8/2024 Interval History: Pt seen in treatment room, accompanied by daughter. She states her pain is better controlled with the resumption of MSER 30mg BID, hydromorphone 6mg PRN which she utilizes once to twice daily, tizanidine 2mg sparingly. She reports pain, in particular, to her RLE with movement. She rates the pain as 'ok' in general with exacerbations with movement, rating 10/10 on occasion. She states the hydromorphone works well in controlling these spikes in pain. She reports moderate pain to her pressure injury and reports it is 'healing nicely'. She continues to offset her weight, apply barrier cream and apply mepilex dressing regularly. She reports difficulty sleeping, no more than three hours at a time, attributes to positioning and healing pressure injury. Moderate swelling continues to DESTINEY LE, encouraged to ambulate, elevate, and maintain protein intake. She states oral intake is low, forces herself to eat. She states she has little appetite and altered taste. Defers medical cannabis at this time. Denies N/V, reports bowels are regular every 1-2 days with bowel regimen in place.   11/22/ Interval History: Pt seen in treatment room, accompanied by her daughter.  Recent PET revealed mixed response noting areas of POD. She states there will be an alteration to her chemotherapy regimen, unsure at this time. She c/w MSER 30mg BID and utilizing hydromorphone 6mg twice daily. She describes the pain often as an intermittent cramping pain to the RLE, relieved by pain regimen and tizanidine 2mg which she reports as most helpful for cramps. RLE continues to be edematous, will begin furosemide.  She continues w in home PT with passive exercises, she is unable to bear weight at this time. States pressure ulcer is healed at this time, continues to offset weight as often as possible. Appetite remains stable, bowels are regular. Mood is stable and positive.   I Stop Ref#: [id number]

## 2024-11-25 NOTE — DATA REVIEWED
[FreeTextEntry1] : PET Skull to Thigh (11/19/2024)  FINDINGS:  HEAD/NECK: Physiologic FDG activity in visualized brain, head, and neck.  THORAX: Decreased though persistent hypermetabolism to lymph nodes in the chest including the right prepectoral and axillary nodes, for reference:  -Decreased size of 4.1 x 3 cm Right axillary node, SUV 7.6, image 77, (previously measured 5.2 x 5 cm, SUV 16)  -Interval decreased size and FDG activity of other upper right hemithorax lymph nodes including currently 1.4 cm moderately intense right supraclavicular node, SUV 6.6 image 57, (previously 3 x 2.3 cm, SUV 18.7.)  Right chest wall port catheter's distal tip terminates in the right atrium.  LUNGS: NEW non-FDG avid pulmonary nodules, too small for resolution of PET/CT for detection including: medial 4 mm right middle lobe nodule, image 92; 5 mm perifissural nodule, image 85; 3 mm right upper/apical nodule, image 67; 3 mm anterior left upper lobe, image 61; 5 mm left lung base image 96.  PLEURA/PERICARDIUM: No abnormal FDG activity. No effusion.  HEPATOBILIARY/PANCREAS: Physiologic FDG activity.  For reference, normal liver demonstrates SUV mean 2.1, previously 2.3. Cholecystectomy.  SPLEEN: Physiologic FDG activity. Normal in size.  ADRENAL GLANDS: No abnormal FDG activity. No nodule.  KIDNEYS/URINARY BLADDER: Physiologic excreted FDG activity. Interval development of mild right hydronephrosis, cause of which is not identified. Cystocele.  REPRODUCTIVE ORGANS: No abnormal FDG activity. Uterus in situ with coarse calcifications.  ABDOMINOPELVIC LYMPH NODES/RETROPERITONEUM: New retroperitoneal mildly enlarged lymph nodes, including along the left periaortic chain, measuring up to 1 x 0.9 cm for left para-aortic node, image 146, SUV 4.4; Newly avid enlarged 1.1 x 2.3 cm right distal external iliac node, SUV 8.6, image 217.  ESOPHAGUS/STOMACH/BOWEL/PERITONEUM/MESENTERY: No abnormal FDG activity.  VESSELS: Unremarkable.  BONES/SOFT TISSUES: Multiple FDG avid bone lesions again visualized, some which have decreased intensity others with new uptake, for reference: Mildly avid right scapular uptake, SUV 6.6, previously 18.1, image 77; Decreasing hypermetabolism to both iliac wings, SUV 6.2 for right iliac tip, image 199, previously was SUV 13.9; as well as decreased intensity of the left iliac wing, however there is NEW osseous lucency with cortical breakthrough of the left iliac. Interval decreased hypermetabolism of the left distal femoral lesion.  Overall, there is new mild diffuse uptake throughout most of the osseous structures, for which anemia or stimulating factors. Sparing of a few lumbar vertebra, which may be secondary to post radiation changes, retrospectively stable.  Focal hypermetabolism corresponding to a nondisplaced right coracoid process fracture, appears acute/subacute.  Radiopharmaceutical contamination adjacent to medial upper thighs and posterior to left coccygeal region.  IMPRESSION: Compared to 8/21/2024 FDG PET/CT:  1. Interval mixed response to therapy (but with overall progression of disease). Areas of decreased activity include decreased hypermetabolism and decreased size of right hemithorax lymph nodes including axillary, prepectoral, and supraclavicular nodes.  2. A few previously hypermetabolic osseous regions have interval decreased intensity, however there is NEW osseous lucency and cortical breakthrough of LEFT iliac wing. New mostly diffuse uptake throughout the osseous structures, which may seen in the setting of anemia or granulocyte stimulating factors limiting sensitivity for lesion detection.  3. Mildly avid FDG avid nondisplaced acute versus subacute right coracoid process fracture, potential pathologic fracture is not excluded.  4. New FDG avid mildly enlarged distal right external iliac node, and NEW left retroperitoneal nodes concerning for potential new metastasis.  5. Interval development of NEW subcentimeter pulmonary nodules, the largest measuring 5 mm and nonavid but too small for resolution of PET/CT for detection. Findings raise concern for pulmonary metastasis; recommend interval follow-up chest CT consider chest CT imaging in 3 months.  6. Interval development of NEW mild right hydronephrosis, cause is not identified.  These findings were communicated via secure [employer] email to referring physician Dr. Minnie Arciniega.  --- End of Report ---

## 2024-11-25 NOTE — DATA REVIEWED
[FreeTextEntry1] : PET Skull to Thigh (11/19/2024)  FINDINGS:  HEAD/NECK: Physiologic FDG activity in visualized brain, head, and neck.  THORAX: Decreased though persistent hypermetabolism to lymph nodes in the chest including the right prepectoral and axillary nodes, for reference:  -Decreased size of 4.1 x 3 cm Right axillary node, SUV 7.6, image 77, (previously measured 5.2 x 5 cm, SUV 16)  -Interval decreased size and FDG activity of other upper right hemithorax lymph nodes including currently 1.4 cm moderately intense right supraclavicular node, SUV 6.6 image 57, (previously 3 x 2.3 cm, SUV 18.7.)  Right chest wall port catheter's distal tip terminates in the right atrium.  LUNGS: NEW non-FDG avid pulmonary nodules, too small for resolution of PET/CT for detection including: medial 4 mm right middle lobe nodule, image 92; 5 mm perifissural nodule, image 85; 3 mm right upper/apical nodule, image 67; 3 mm anterior left upper lobe, image 61; 5 mm left lung base image 96.  PLEURA/PERICARDIUM: No abnormal FDG activity. No effusion.  HEPATOBILIARY/PANCREAS: Physiologic FDG activity.  For reference, normal liver demonstrates SUV mean 2.1, previously 2.3. Cholecystectomy.  SPLEEN: Physiologic FDG activity. Normal in size.  ADRENAL GLANDS: No abnormal FDG activity. No nodule.  KIDNEYS/URINARY BLADDER: Physiologic excreted FDG activity. Interval development of mild right hydronephrosis, cause of which is not identified. Cystocele.  REPRODUCTIVE ORGANS: No abnormal FDG activity. Uterus in situ with coarse calcifications.  ABDOMINOPELVIC LYMPH NODES/RETROPERITONEUM: New retroperitoneal mildly enlarged lymph nodes, including along the left periaortic chain, measuring up to 1 x 0.9 cm for left para-aortic node, image 146, SUV 4.4; Newly avid enlarged 1.1 x 2.3 cm right distal external iliac node, SUV 8.6, image 217.  ESOPHAGUS/STOMACH/BOWEL/PERITONEUM/MESENTERY: No abnormal FDG activity.  VESSELS: Unremarkable.  BONES/SOFT TISSUES: Multiple FDG avid bone lesions again visualized, some which have decreased intensity others with new uptake, for reference: Mildly avid right scapular uptake, SUV 6.6, previously 18.1, image 77; Decreasing hypermetabolism to both iliac wings, SUV 6.2 for right iliac tip, image 199, previously was SUV 13.9; as well as decreased intensity of the left iliac wing, however there is NEW osseous lucency with cortical breakthrough of the left iliac. Interval decreased hypermetabolism of the left distal femoral lesion.  Overall, there is new mild diffuse uptake throughout most of the osseous structures, for which anemia or stimulating factors. Sparing of a few lumbar vertebra, which may be secondary to post radiation changes, retrospectively stable.  Focal hypermetabolism corresponding to a nondisplaced right coracoid process fracture, appears acute/subacute.  Radiopharmaceutical contamination adjacent to medial upper thighs and posterior to left coccygeal region.  IMPRESSION: Compared to 8/21/2024 FDG PET/CT:  1. Interval mixed response to therapy (but with overall progression of disease). Areas of decreased activity include decreased hypermetabolism and decreased size of right hemithorax lymph nodes including axillary, prepectoral, and supraclavicular nodes.  2. A few previously hypermetabolic osseous regions have interval decreased intensity, however there is NEW osseous lucency and cortical breakthrough of LEFT iliac wing. New mostly diffuse uptake throughout the osseous structures, which may seen in the setting of anemia or granulocyte stimulating factors limiting sensitivity for lesion detection.  3. Mildly avid FDG avid nondisplaced acute versus subacute right coracoid process fracture, potential pathologic fracture is not excluded.  4. New FDG avid mildly enlarged distal right external iliac node, and NEW left retroperitoneal nodes concerning for potential new metastasis.  5. Interval development of NEW subcentimeter pulmonary nodules, the largest measuring 5 mm and nonavid but too small for resolution of PET/CT for detection. Findings raise concern for pulmonary metastasis; recommend interval follow-up chest CT consider chest CT imaging in 3 months.  6. Interval development of NEW mild right hydronephrosis, cause is not identified.  These findings were communicated via secure [employer] email to referring physician Dr. Minnie Arciniega.  --- End of Report ---

## 2024-11-25 NOTE — ASSESSMENT
[FreeTextEntry1] : 62yo F with:  # Metastatic triple negative breast cancer - Primary areas involved are the right femoral neck/head, R ilium, L posterior acetabulum, L ischium, L femoral shaft, and R scapula - S/p radiation while at [facility] - On chemotherapy  # Pain - Continue MSER 30mg BID - Continue Dilaudid PO 6mg - using twice daily - Continue tizanidine 2mg, taking twice tablet daily - Counseled on importance of maintaining bowel regularity in light of regular opioid use.   # B/l lower extremity Neuropathy  - Gabapentin 300mg TID  # Constipation - Has not been regimented with medication - Has chronic constipation - Instructed to take Senna two tab twice daily and Miralax twice daily  # Debility - Pt is physically limited due to malignancy and its sequelae  - Getting home care services  #LE Edema - DESTINEY LE Doppler US ordered R/O DVT -Remains on Eliquis 5mg daily for hx clot to port  # Encounter for Palliative Care - Explained the role of palliative care in enhancing quality of life in the setting of serious illness. Emotional support provided.     Follow up in 2 weeks. Instructed to call office with any questions or concerns.

## 2024-11-25 NOTE — HISTORY OF PRESENT ILLNESS
[FreeTextEntry1] : 62yo F presents for follow up, referred by oncology.  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   Oncology hx: She presented with a right axillary mass and arm swelling in July 2023. She went to an emergency room in [place] and diagnosed with metastatic triple negative breast cancer. She received palliative Taxol and pembrolizumab in July 2023. She underwent a course of palliative radiation therapy to the lower spine, completing treatment in September 2023. Progression of disease was noted in January 2024, so treatment was switched to carboplatin gemcitabine and pembrolizumab in February 2024.  In May 2024, she relocated from [place] to [place]. She has resumed treatment under the care of Dr. Arciniega.  CT angiogram on 7/14/24 showed extensive right axillary and supraclavicular adenopathy measuring up to 4.9 cm. CT abdomen and pelvis showed multiple lytic bone lesions involving L5, the sacrum, pelvic bones and right femoral head.  PET/CT on 8/21/24 showed avid right supraclavicular, subpectoral, and axillary lymph nodes. Multiple lytic and soft tissue avid bone metastases were present associated with pathological fractures of the right superior pubic ramus and left proximal fibula. There were multiple lesions of the right breast and skin.  She started treatment with Trodelvy on 8/23/24.  [facility] visit 9/4: She is having pain from her waist down since March 2024. The pain is most severe in the right hip region and the left lower leg. The pain is worse with weight bearing. She cannot walk without assistance. She is also having pain in her right shoulder, clavicle region when she moves her arm a certain way. She is taking hydromorphone 4mg every 6 hours and gabapentin 300mg tid, with some temporary relief. She will meet with orthopedic surgery, Dr. Lauren, next week.  Ortho visit 9/11: 61F w/ multifocal skeletal metastatic breast ca to bone. The primary areas involved are the right femoral neck/head, R ilium, L posterior acetabulum, L ischium, L femoral shaft, and R scapula. The current PET was not a full body study therefore I have rec'd a bone scan for further assessment and to establish a baseline of her current skeletal disease involvement. She is symptomatic with regards to the right hip which is at risk for developing a pathologic fracture. I would not recommend IMN at this stage given the lytic region in the femoral head. For this reason the best surgical option would be right hip hemiarthroplasty- a procedure which can be done electively or if she breaks at some point in the future. At this stage the main focus is on delivering chemotherapy, a treatment which would be halted if we proceed with surgery. For this reason, I think it's best to hold off on surgery for now while she gets systemic treatment, with the understanding that she will require hemiarthroplasty if she develops a fracture in the future. I have also recommended that she follow up with a general surgeon with regards to the right axillary adenopathy for further evaluation.    Onc 10/11/24: Patient is here now for cycle 3D1. She was admitted at [facility] with intractable pain. she received palliative RT. Pain level is 5-7 out of 10. She is followed by palliative. She is on Dilaudid and morphine. She is able to shuffle slowly and get around. Not able to bear weight.  She states that her appetite and fatigue has improved secondary to transient chemo break. Weight is stable. No neuropathy or fevers. I recommend to complete 2 additional cycles of Trodelvy followed by repeat scans.  She had low hemoglobin which is stable now. she has met with orthopedic oncology and is refusing pending surgical fixation. 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 Denies any SOB or palpitations. ----------------------------------------------------  Pt was initially referred to supportive oncology for symptom management. She is in a wheelchair, but usually ambulates with a walker. She describes pain located in R hip, radiates to R knee and anterior thigh. No numbness. Described as severe ache and spasms. Currently at 7/10. Dilaudid brings it down to 7/10. Last taken around 1pm today. Following a schedule with Dilaudid 4mg, taking at 1am, 7am, 1pm, 7pm. Relief lasts about 4 hours. The last 5 days, pain has significantly worsened. Heating pads provide some relief. The pain never goes below 7/10, and at this level she is unable to carry out ADLs, interact with family or socialize. She is also taking Gabapentin 300mg TID. Using Senna and Miralax. Had BM yesterday. Denies nausea, vomiting. Poor appetite due to pain. Poor sleep, laying in bed a lot and waking up due to pain. Difficult to express emotions due to the pain, remains quiet.  Social: Lives with mom (Kathryn) and dad (Henrik Chisholm), daughter (Irina Singh), and son. Moved from [place] 3 months ago. Worked as a nurse in [place] Advance Directives / Decision Makers: Father and daughter help with medical decisions  10/14/24 Interval History: She was recently at [facility] for uncontrolled pain and radiation treatment. She is S/p 5 fractions of radiation, ended on 10/2/24. During that admission, she was seen by palliative care team and started on Morphine ER 30mg q8h, and Dilaudid PO 4-6mg q4h PRN. She states her pain seemed to be well controlled up until 2 days ago where the pain in her hip/legs have worsened. She denies any recent trauma or fall. She is taking Dilaudid PO 4mg once in the morning and Tizanidine 2mg as needed, on average once or less per day. When she takes the Dialudid 4mg PO in the morning, she gets 1-2 hrs of relief. She was hesitant to take it any more times per day because she was concerned to mix it with her long-acting morphine. She also continues on Gabapentin 300mg TID. She continues on a bowel regimen with regular BMs. She has no other concerns today. Next chemo is this Friday.  10/21/24 Interval History: Ran out of Morphine ER 10/16. Taking Dilaudid PO 4mg 1.5 tab about 2-3x daily. She states since being off the Morphine ER, she feels she may not need to restart a long acting. She believes the radiation may be helping her pain and she doesn't need a long acting. She takes Claritin after Nulesta injections with relief for about 5 days. She is also taking Tizanidine 2mg about once daily with relief. Admits to constipation, taking Senna sporadically but now taking 3 tab at bedtime.  11/1/2024 Interval History: Since d/c the MSER she reports pain has risen, stating it rises to 10/10. She has been utilizing hydromorphone 6mg three to four times daily. She utilizes Tizanidine 2mg once to twice daily with limited relief. She states the pain lessens if she lays still. She reports pain to sacral stage III continues to offset weight, apply barrier creams and dressings. Pain from the pressure injury often wakes her at night. She is eating two meals daily, denies N/V/D. Today she presents chairside with swelling to RLE, + pedal pulses. She states she is compliant with Eliquis regimen.   11/8/2024 Interval History: Pt seen in treatment room, accompanied by daughter. She states her pain is better controlled with the resumption of MSER 30mg BID, hydromorphone 6mg PRN which she utilizes once to twice daily, tizanidine 2mg sparingly. She reports pain, in particular, to her RLE with movement. She rates the pain as 'ok' in general with exacerbations with movement, rating 10/10 on occasion. She states the hydromorphone works well in controlling these spikes in pain. She reports moderate pain to her pressure injury and reports it is 'healing nicely'. She continues to offset her weight, apply barrier cream and apply mepilex dressing regularly. She reports difficulty sleeping, no more than three hours at a time, attributes to positioning and healing pressure injury. Moderate swelling continues to DESTINEY LE, encouraged to ambulate, elevate, and maintain protein intake. She states oral intake is low, forces herself to eat. She states she has little appetite and altered taste. Defers medical cannabis at this time. Denies N/V, reports bowels are regular every 1-2 days with bowel regimen in place.   11/22/ Interval History: Pt seen in treatment room, accompanied by her daughter.  Recent PET revealed mixed response noting areas of POD. She states there will be an alteration to her chemotherapy regimen, unsure at this time. She c/w MSER 30mg BID and utilizing hydromorphone 6mg twice daily. She describes the pain often as an intermittent cramping pain to the RLE, relieved by pain regimen and tizanidine 2mg which she reports as most helpful for cramps. RLE continues to be edematous, will begin furosemide.  She continues w in home PT with passive exercises, she is unable to bear weight at this time. States pressure ulcer is healed at this time, continues to offset weight as often as possible. Appetite remains stable, bowels are regular. Mood is stable and positive.   I Stop Ref#: [id number]

## 2024-11-25 NOTE — DATA REVIEWED
[FreeTextEntry1] : PET Skull to Thigh (11/19/2024)  FINDINGS:  HEAD/NECK: Physiologic FDG activity in visualized brain, head, and neck.  THORAX: Decreased though persistent hypermetabolism to lymph nodes in the chest including the right prepectoral and axillary nodes, for reference:  -Decreased size of 4.1 x 3 cm Right axillary node, SUV 7.6, image 77, (previously measured 5.2 x 5 cm, SUV 16)  -Interval decreased size and FDG activity of other upper right hemithorax lymph nodes including currently 1.4 cm moderately intense right supraclavicular node, SUV 6.6 image 57, (previously 3 x 2.3 cm, SUV 18.7.)  Right chest wall port catheter's distal tip terminates in the right atrium.  LUNGS: NEW non-FDG avid pulmonary nodules, too small for resolution of PET/CT for detection including: medial 4 mm right middle lobe nodule, image 92; 5 mm perifissural nodule, image 85; 3 mm right upper/apical nodule, image 67; 3 mm anterior left upper lobe, image 61; 5 mm left lung base image 96.  PLEURA/PERICARDIUM: No abnormal FDG activity. No effusion.  HEPATOBILIARY/PANCREAS: Physiologic FDG activity.  For reference, normal liver demonstrates SUV mean 2.1, previously 2.3. Cholecystectomy.  SPLEEN: Physiologic FDG activity. Normal in size.  ADRENAL GLANDS: No abnormal FDG activity. No nodule.  KIDNEYS/URINARY BLADDER: Physiologic excreted FDG activity. Interval development of mild right hydronephrosis, cause of which is not identified. Cystocele.  REPRODUCTIVE ORGANS: No abnormal FDG activity. Uterus in situ with coarse calcifications.  ABDOMINOPELVIC LYMPH NODES/RETROPERITONEUM: New retroperitoneal mildly enlarged lymph nodes, including along the left periaortic chain, measuring up to 1 x 0.9 cm for left para-aortic node, image 146, SUV 4.4; Newly avid enlarged 1.1 x 2.3 cm right distal external iliac node, SUV 8.6, image 217.  ESOPHAGUS/STOMACH/BOWEL/PERITONEUM/MESENTERY: No abnormal FDG activity.  VESSELS: Unremarkable.  BONES/SOFT TISSUES: Multiple FDG avid bone lesions again visualized, some which have decreased intensity others with new uptake, for reference: Mildly avid right scapular uptake, SUV 6.6, previously 18.1, image 77; Decreasing hypermetabolism to both iliac wings, SUV 6.2 for right iliac tip, image 199, previously was SUV 13.9; as well as decreased intensity of the left iliac wing, however there is NEW osseous lucency with cortical breakthrough of the left iliac. Interval decreased hypermetabolism of the left distal femoral lesion.  Overall, there is new mild diffuse uptake throughout most of the osseous structures, for which anemia or stimulating factors. Sparing of a few lumbar vertebra, which may be secondary to post radiation changes, retrospectively stable.  Focal hypermetabolism corresponding to a nondisplaced right coracoid process fracture, appears acute/subacute.  Radiopharmaceutical contamination adjacent to medial upper thighs and posterior to left coccygeal region.  IMPRESSION: Compared to 8/21/2024 FDG PET/CT:  1. Interval mixed response to therapy (but with overall progression of disease). Areas of decreased activity include decreased hypermetabolism and decreased size of right hemithorax lymph nodes including axillary, prepectoral, and supraclavicular nodes.  2. A few previously hypermetabolic osseous regions have interval decreased intensity, however there is NEW osseous lucency and cortical breakthrough of LEFT iliac wing. New mostly diffuse uptake throughout the osseous structures, which may seen in the setting of anemia or granulocyte stimulating factors limiting sensitivity for lesion detection.  3. Mildly avid FDG avid nondisplaced acute versus subacute right coracoid process fracture, potential pathologic fracture is not excluded.  4. New FDG avid mildly enlarged distal right external iliac node, and NEW left retroperitoneal nodes concerning for potential new metastasis.  5. Interval development of NEW subcentimeter pulmonary nodules, the largest measuring 5 mm and nonavid but too small for resolution of PET/CT for detection. Findings raise concern for pulmonary metastasis; recommend interval follow-up chest CT consider chest CT imaging in 3 months.  6. Interval development of NEW mild right hydronephrosis, cause is not identified.  These findings were communicated via secure [employer] email to referring physician Dr. Minnie Arciniega.  --- End of Report ---

## 2024-11-25 NOTE — ASSESSMENT
[FreeTextEntry1] : 60yo F with:  # Metastatic triple negative breast cancer - Primary areas involved are the right femoral neck/head, R ilium, L posterior acetabulum, L ischium, L femoral shaft, and R scapula - S/p radiation while at [facility] - On chemotherapy  # Pain - Continue MSER 30mg BID - Continue Dilaudid PO 6mg - using twice daily - Continue tizanidine 2mg, taking twice tablet daily - Counseled on importance of maintaining bowel regularity in light of regular opioid use.   # B/l lower extremity Neuropathy  - Gabapentin 300mg TID  # Constipation - Has not been regimented with medication - Has chronic constipation - Instructed to take Senna two tab twice daily and Miralax twice daily  # Debility - Pt is physically limited due to malignancy and its sequelae  - Getting home care services  #LE Edema - DESTINEY LE Doppler US ordered R/O DVT -Remains on Eliquis 5mg daily for hx clot to port  # Encounter for Palliative Care - Explained the role of palliative care in enhancing quality of life in the setting of serious illness. Emotional support provided.     Follow up in 2 weeks. Instructed to call office with any questions or concerns.

## 2024-11-27 NOTE — PHYSICAL EXAM
[Ambulatory and capable of all self care but unable to carry out any work activities] : Status 2- Ambulatory and capable of all self care but unable to carry out any work activities. Up and about more than 50% of waking hours [Normal] : affect appropriate [de-identified] : in wheelchair  [de-identified] : large right axillary and right cervical LN

## 2024-11-27 NOTE — ASSESSMENT
[FreeTextEntry1] : This is a very pleasant 61-year-old female diagnosed with metastatic triple negative breast cancer (metastatic disease to bones and lymph nodes) in July 2023, received Taxol Keytruda 7/202 3-1/2024, POD bones, Carboplatin Gemcitabine and Keytruda 2/202 4-5/2024, then she decided to take a treatment break.  She has been off treatment and reports worsening of palpable metastatic lymph nodes and worsening bone pain. Trodelvy initiated on 8/23/2024.     METASTATIC BREAST CA: Triple Negative (ER: Negative < 1%, ME: Negative < 1%, HER2: Negative, 1+ staining). Slide Review completed by [facility] on 8/14/2024. Original biopsy site: Right Axilla. Biopsy completed at [facility] on 7/12/2023.    11/22/24 Patient had imaging done on 11/2024 . Upon review of PET/CT films, my interpretation is that patient has mixed response.  Some of the valentin disease and bone mets are improved but she has new and worsening bony lesions as well as fracture causing symptoms.  She has new lung metastases.  Imaging was reviewed in the tumor board and it was consensus to switch her treatment due to overall worsening disease. I reviewed these findings with the patient and daughter.  We will switch to eribulin She has worsening bilateral lower extremity swelling.  She had Doppler done recently to rule out DVT.  She is mostly sedentary due to pelvic fractures and pain.  She is taking pain medication for pain control.  Recommend Lasix for lower extremity swelling.  She is also referred to radiation oncology for radiation to painful bony left hip metastasis  She has malignant bone pain.  She is followed by palliative.  She is on Dilaudid and morphine.  She is able to shuffle slowly and get around.  Not able to bear weight. She had low hemoglobin which is stable now. she has met with orthopedic oncology and is refusing surgical fixation. 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 Denies any SOB or palpitations.  Port was replaced last week.  Lower extremity Doppler 11/2024 did not show evidence of DVT   CBC reviewed with the patient today to make sure she is not neutropenic from high-risk cancer therapy drug.  We will continue to monitor CBC every 2 to 4 weeks for intense drug monitoring. Patient is on cytotoxic chemotherapy and needs intensive monitoring for severe toxicity.  [id number] testing sent during last visit Plan to re-biopsy on progression   MALIGNANT BONE PAIN:  PET baseline 8/2024 d/w her. She has several pathologic fractures. Ms. Singh has established care with Palliative Care on 18 September 2024 (follow-up scheduled in one week/Telemedicine Visit). Patient reports she enjoyed meeting this provider and was thankful for her recommendations to change the Hydromorphone from Q6 hours to Q4 hours. Additionally, she is now taking Tizanidine 2mg, 1 to 2 tablets every 8 hours for muscle spasms. She reports this medication has made a big difference and reports her overall pain is much more manageable/tolerable. Patient will also continue to take Gabapentin 300 mg TID.   Ms. Singh also met with Dr. Serena Bustos from Radiation Oncology on 9 September 2024. She reports being much more open to the idea of RT for pain relief and signed informed consent at the initial consultation. It was discussed at that visit a plan of care will be created once the patient established care with Dr. Lauren.  Ms. Singh established care with Dr. Subhash Lauren from Orthopedic Surgery. Patient was offered Right Hip Hemiarthroplasty and surgical stabilization of the Left femur. The patient declined surgical intervention and was instructed to return to the office in 1 month for repeat x-ray imaging.    LYMPHADENOPATHY: Visible and palpable Right Cervical and Axillary lymphadenopathy. Mild lymphedema secondary. Will continue to monitor.  CHEMO INDUCED NEUROPATHY: Mild. Continue Gabapentin, 300 mg TID.   WEIGHT LOSS: Likely secondary to malignancy as well as intermittent fasting.  Recommend not to fast during chemotherapy as she will need calorie intake. Nutrition consulted on 8/23/2024; patient seen chairside during Trodelvy Cycle 1, Day 1. Appreciate note and recommendations.   PORT ISSUES: Patient continues to take Eliquis daily (5 mg PO BID) for port r/t thrombus.   GENETICS: Obtain BRCA test results to see if she is a candidate for Olaparib.  FATIGUE:  2/2 MALIGNANCY and treatment: encourage increase activity as tolerated. Mild fatigue tolerable/stable, energy waxes/wanes.  - Chemotherapy induced anemia- Grade 3. Recommend PRBC transfusion to maintain Hb> 8.  At this time, the patient denies being symptomatic and wishes to see if her bone marrow can recover on its own. She has been attending numerous appointments (Palliative, Orthopedics, RT) and is becoming overwhelmed.  - Risk for Chemotherapy induced diarrhea- Imodium PRN. Maintain PO hydration. BRAT diet - Risk for Chemotherapy induced N/V- Will get pre-medications with Emend, Dexamethasone and Aloxi. She will continue Dexamethasone for next 3 days for antinausea prophylaxis. She will use Reglan as needed.  - GF induced bone pain- Patient to take Claritin Days 2-7. - Chemotherapy induced dysgeusia, wt loss and fatigue: Encourage oral fluids, small frequent meals.  - Chemo induced neuropathy- continue to monitor. - Alopecia- prescription for wig given.  - Instructed to call office and go directly to the emergency room with fever more than 100.4, shaking chills, productive cough, sore throat, shortness of breath or urinary symptoms. Patient verbalized understanding and agreement. - Emotional support provided, all questions answered.  CHEMO AND BLOOD WORK ORDERED IN [place] FOR TODAY APPTS REVIEWED AND SCHEDULED DURING THIS VISIT

## 2024-11-27 NOTE — HISTORY OF PRESENT ILLNESS
[Disease: _____________________] : Disease: [unfilled] [de-identified] :  This is a very pleasant 61-year-old female presented today for an evaluation for metastatic triple negative breast cancer.  Patient reports she noticed a large lump under her right arm and lymphedema in July 2023.  She went to the emergency room in [place] and workup showed metastatic triple negative breast cancer.  She underwent a right lymph node biopsy which was reportedly triple negative.  Biopsy report is not available for review.  She started palliative intent Taxol and pembrolizumab July 2023.  A PET/CT September 2023 showed mixed response, and she continued on the same regimen.  A PET/CT in January 2024 showed marked progression of disease in the bones.  Treatment was changed to carboplatin gemcitabine and pembrolizumab in February 2024.  She reportedly developed neutropenia and needed Neulasta for subsequent cycles.  PET/CT May 2024 showed stable disease.  Her last chemotherapy treatment was on 5/17/2024.  She decided to move to [place] close to family.  She has not had any treatment since then.  She reports she wanted to take a treatment break and has been taking holistic treatments.  She was disappointed that  neck and armpit masses grew during treatment break.  She was seen at [facility] last month and is referred here for continuation of care.  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  She is fasting 16 to 24 hours and has lost 40 pounds in the last 4 months.  She has significant right hip and left leg pain.  She is taking Dilaudid as needed and gabapentin.  Currently pain is 5 out of 10.  She walks with a cane and pain gets worse on walking after 1-2 blocks.  She had radiation to spine last year and developed significant toxicity and does not want to take radiation anymore. She is currently getting home PT. she reports skin discoloration from radiation and Neulasta.  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  Port has not been placed in 3 months.  She has double-lumen port.  She reports BRCA testing was done but she is not aware of results.  She lives with her parents and her daughter is supporting her.  8/23/2024 Patient presents for Trodelvy Cycle 1, Day 1. She is accompanied by her daughter. All questions asked/answered prior to initiation of treatment.  Patient underwent Port Study on 8/2124 at [facility].  Procedure Findings demonstrated a left-sided double port with tip of catheter in the SVC. The left port was accessed under sterile conditions with blood return and appropriate flushing was demonstrated. Hand injection of contrast demonstrates filling of port well and lumen without evidence of fibrin sheath. Unfortunately, multiple attempts were made with right port however it was unsuccessful. The plan per the Interventional Radiologist included: 1. Left port may be accessed and utilized for her upcoming treatment of chemotherapy. 2. Suggestion of possible replacement with a single lumen Mediport. Ms. Singh c/o of significant pain 10/10 on the pain scale. Reports she ran out of Hydromorphone and Gabapentin that was dispensed in the ER since last week. Ms. Singh reported that her pain was well controlled when taking Hydromorphone and Gabapentin as prescribed. Refills sent to [facility], and delivered at chairside. 4mg of Hydromorphone prescribed to be given at chairside prior to treatment. 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 Pathologic fractures in the right superior pubic ramus and left proximal fibula were noted on recent PET CT scan completed on 8/21/2024. Patient continues to take Eliquis daily (5 mg PO BID) for port r/t thrombus. Patient denies any s/s c/w easily bleeding or bruising.  Ms. Singh requested to speak with Nutrition today. Nutrition team was able to see the patient at the chairside. Appreciate note and recommendations. RTO: with each Cycle of Trodelvy, on Day 1. Appointments reviewed with patient and print out given.   8/28/2024 Patient presents today for repeat blood work and to complete [employer] Testing.  We again discussed the recent results of PTT/INR lab work that was significantly elevated on 8/23/2024. Patient was contacted on 8/26/2024 to initially discuss the elevated lab results. Ms. Singh reported that she was taking the Eliquis 5 mg PO BID as indicated. Upon questioning the patient further regarding the matter, she stated she ran out of the medication and needed a refill. Patient continues to deny as signs of bleeding or bruising. Results of PTT/INR today were WNL, and Eliquis 5mg PO BID was ordered to [facility].   Patient reported taking several supplements to include: Turmeric, Vitamin E, Black Seed Oil and several other non-FDA approved mixed/multi-vitamins up until Day 1 of treatment (8/23/2024). We discussed that these particular supplements may have unknown drug interactions with treatment and can also elevate both PTT & INR. The patient has since discontinued the supplements.   Discussed that [id number] testing is used to identify genomic alterations within the cancer's DNA that may make the patient eligible for a specific therapy/personalized treatment for her cancer. Patient verbalized consent and signed the necessary paperwork. Our office to convey results when they are made available.   Ms. Singh reports her pain is slightly improved since she started re-taking Dilaudid and Gabapentin as prescribed. Referral was made to Palliative Care, and an appointment was scheduled for 18 September 2024.   8/30/2024: day8 of Trodelvy,  She reports significant fatigue and altered taste x 3-4 days after chemo. She was able to function, maintained PO intake, weight stable. She had mild nausea, took Reglan, no vomiting, no diarrhea or mouth sores. No neuropathy, no fevers. Hb low, discussed PRBC tx PET baseline 8/2024 d/w her. She has several pathologic fractures. She doesnt want to go to ER, thinks they arent new. She has been hesitant about RT. Discussed importance of ortho onc consultation and pall RT for bone fractures and pain. No wt beaning until ortho consult.  She is on Eliquis 5 mg PO BID for port-related thrombus. PT/INR was significantly high, normalized on repeat labs  Has R shoulder, pelvis, LE pain somewhat controlled on Dilaudid every 4 hours, gabapentin Q3H. A. Pending palliative care appointment for pain management next month.  Does not have appetite but is taking small portions of meals. lso taking ensure Holding supplements 2 days before and after the chemo: Turmeric, Vitamin E, Black Seed Oil and several other non-FDA approved mixed/multi-vitamins up  [id number] testing sent during last visit Palliative Care, and an appointment was scheduled for 18 September 2024.   9/20/2024 Patient presents today for Cycle 2, Day 8 of Trodelvy.  She reports significant fatigue and altered taste x 3-4 days after chemo. She remains able to function, maintained PO intake, and her weight is stable. She continues to experience mild nausea, but it is improving, and no Reglan was needed after Cycle 2, Day 1. Denies fevers, vomiting, diarrhea, neuropathy or mouth sores.  Discussed low Hgb (8.1 g/dL), and possible need for PRBC transfusion. At this time, the patient denies being symptomatic and wishes to see if her bone marrow can recover on its own. She has been attending numerous appointments (Palliative, Orthopedics, RT) and is becoming overwhelmed.  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 Denies any SOB or palpitations. Ms. Singh has established care with Palliative Care on 18 September 2024 (follow-up scheduled in one week/Telemedicine Visit). Patient reports she enjoyed meeting this provider and was thankful for her recommendations to change the Hydromorphone from Q6 hours to Q4 hours. Additionally, she is now taking Tizanidine 2mg, 1 to 2 tablets every 8 hours for muscle spasms. She reports this medication has made a big difference and reports her overall pain is much more manageable/tolerable. Patient will also continue to take Gabapentin 300 mg TID. She is also now on a stricter bowel regimen taking Colace TID and Miralax daily. She is having BM's every other day.  Ms. Singh also met with Dr. Serena Bustos from Radiation Oncology on 9 September 2024. She reports being much more open to the idea of RT for pain relief and signed informed consent at the initial consultation. It was discussed at that visit a plan of care will be created once the patient established care with Dr. Lauren.  Ms. Singh established care with Dr. Subhash Lauren from Orthopedic Surgery. Patient was offered Right Hip Hemiarthroplasty and surgical stabilization of the Left femur. The patient declined surgical intervention and was instructed to return to the office in 1 month for repeat x-ray imaging.  Patient continues to hold supplements 2 days before and after the chemo: Turmeric, Vitamin E, Black Seed Oil and several other non-FDA approved mixed/multi-vitamins. RTO: with each Cycle of Trodelvy, on Day 8. [de-identified] : This is a very pleasant 61-year-old female diagnosed with metastatic triple negative breast cancer (metastatic disease to bones and lymph nodes) in July 2023, received Taxol Keytruda 7/202 3-1/2024, POD bones, Carboplatin Gemcitabine and Keytruda 2/202 4-5/2024, then she decided to take a treatment break.  She has been off treatment and reports worsening of palpable metastatic lymph nodes and worsening bone pain. Trodelvy initiated on 8/23/2024.  Palliative RT to the bone 9/2024 11/22/24 Patient had imaging done on 11/2024 . Upon review of PET/CT films, my interpretation is that patient has mixed response.  Some of the valentin disease and bone mets are improved but she has new and worsening bony lesions as well as fracture causing symptoms.  She has new lung metastases.  Imaging was reviewed in the tumor board and it was consensus to switch her treatment due to overall worsening disease. I reviewed these findings with the patient and daughter.  We will switch to eribulin She has worsening bilateral lower extremity swelling.  She had Doppler done recently to rule out DVT.  She is mostly sedentary due to pelvic fractures and pain.  She is taking pain medication for pain control.  Recommend Lasix for lower extremity swelling.  She is also referred to radiation oncology for radiation to painful bony left hip metastasis  She has malignant bone pain.  She is followed by palliative.  She is on Dilaudid and morphine.  She is able to shuffle slowly and get around.  Not able to bear weight. She had low hemoglobin which is stable now. she has met with orthopedic oncology and is refusing surgical fixation. 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 Denies any SOB or palpitations.  Port was replaced last week.  Lower extremity Doppler 11/2024 did not show evidence of DVT

## 2024-11-27 NOTE — PHYSICAL EXAM
[Ambulatory and capable of all self care but unable to carry out any work activities] : Status 2- Ambulatory and capable of all self care but unable to carry out any work activities. Up and about more than 50% of waking hours [Normal] : affect appropriate [de-identified] : in wheelchair  [de-identified] : large right axillary and right cervical LN

## 2024-11-27 NOTE — ASSESSMENT
[FreeTextEntry1] : This is a very pleasant 61-year-old female diagnosed with metastatic triple negative breast cancer (metastatic disease to bones and lymph nodes) in July 2023, received Taxol Keytruda 7/202 3-1/2024, POD bones, Carboplatin Gemcitabine and Keytruda 2/202 4-5/2024, then she decided to take a treatment break.  She has been off treatment and reports worsening of palpable metastatic lymph nodes and worsening bone pain. Trodelvy initiated on 8/23/2024.     METASTATIC BREAST CA: Triple Negative (ER: Negative < 1%, [place]: Negative < 1%, HER2: Negative, 1+ staining). Slide Review completed by [employer] on 8/14/2024. Original biopsy site: Right Axilla. Biopsy completed at [facility] on 7/12/2023.    11/22/24 Patient had imaging done on 11/2024 . Upon review of PET/CT films, my interpretation is that patient has mixed response.  Some of the valentin disease and bone mets are improved but she has new and worsening bony lesions as well as fracture causing symptoms.  She has new lung metastases.  Imaging was reviewed in the tumor board and it was consensus to switch her treatment due to overall worsening disease. I reviewed these findings with the patient and daughter.  We will switch to eribulin She has worsening bilateral lower extremity swelling.  She had Doppler done recently to rule out DVT.  She is mostly sedentary due to pelvic fractures and pain.  She is taking pain medication for pain control.  Recommend Lasix for lower extremity swelling.  She is also referred to radiation oncology for radiation to painful bony left hip metastasis  She has malignant bone pain.  She is followed by palliative.  She is on Dilaudid and morphine.  She is able to shuffle slowly and get around.  Not able to bear weight. She had low hemoglobin which is stable now. she has met with orthopedic oncology and is refusing surgical fixation. 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 Denies any SOB or palpitations.  Port was replaced last week.  Lower extremity Doppler 11/2024 did not show evidence of DVT   CBC reviewed with the patient today to make sure she is not neutropenic from high-risk cancer therapy drug.  We will continue to monitor CBC every 2 to 4 weeks for intense drug monitoring. Patient is on cytotoxic chemotherapy and needs intensive monitoring for severe toxicity.  [id number] testing sent during last visit Plan to re-biopsy on progression   MALIGNANT BONE PAIN:  PET baseline 8/2024 d/w her. She has several pathologic fractures. Ms. Singh has established care with Palliative Care on 18 September 2024 (follow-up scheduled in one week/Telemedicine Visit). Patient reports she enjoyed meeting this provider and was thankful for her recommendations to change the Hydromorphone from Q6 hours to Q4 hours. Additionally, she is now taking Tizanidine 2mg, 1 to 2 tablets every 8 hours for muscle spasms. She reports this medication has made a big difference and reports her overall pain is much more manageable/tolerable. Patient will also continue to take Gabapentin 300 mg TID.   Ms. Singh also met with Dr. Serena Bustos from Radiation Oncology on 9 September 2024. She reports being much more open to the idea of RT for pain relief and signed informed consent at the initial consultation. It was discussed at that visit a plan of care will be created once the patient established care with Dr. Lauren.  Ms. Singh established care with Dr. Subhash Lauren from Orthopedic Surgery. Patient was offered Right Hip Hemiarthroplasty and surgical stabilization of the Left femur. The patient declined surgical intervention and was instructed to return to the office in 1 month for repeat x-ray imaging.    LYMPHADENOPATHY: Visible and palpable Right Cervical and Axillary lymphadenopathy. Mild lymphedema secondary. Will continue to monitor.  CHEMO INDUCED NEUROPATHY: Mild. Continue Gabapentin, 300 mg TID.   WEIGHT LOSS: Likely secondary to malignancy as well as intermittent fasting.  Recommend not to fast during chemotherapy as she will need calorie intake. Nutrition consulted on 8/23/2024; patient seen chairside during Trodelvy Cycle 1, Day 1. Appreciate note and recommendations.   PORT ISSUES: Patient continues to take Eliquis daily (5 mg PO BID) for port r/t thrombus.   GENETICS: Obtain BRCA test results to see if she is a candidate for Olaparib.  FATIGUE:  2/2 MALIGNANCY and treatment: encourage increase activity as tolerated. Mild fatigue tolerable/stable, energy waxes/wanes.  - Chemotherapy induced anemia- Grade 3. Recommend PRBC transfusion to maintain Hb> 8.  At this time, the patient denies being symptomatic and wishes to see if her bone marrow can recover on its own. She has been attending numerous appointments (Palliative, Orthopedics, RT) and is becoming overwhelmed.  - Risk for Chemotherapy induced diarrhea- Imodium PRN. Maintain PO hydration. BRAT diet - Risk for Chemotherapy induced N/V- Will get pre-medications with Emend, Dexamethasone and Aloxi. She will continue Dexamethasone for next 3 days for antinausea prophylaxis. She will use Reglan as needed.  - GF induced bone pain- Patient to take Claritin Days 2-7. - Chemotherapy induced dysgeusia, wt loss and fatigue: Encourage oral fluids, small frequent meals.  - Chemo induced neuropathy- continue to monitor. - Alopecia- prescription for wig given.  - Instructed to call office and go directly to the emergency room with fever more than 100.4, shaking chills, productive cough, sore throat, shortness of breath or urinary symptoms. Patient verbalized understanding and agreement. - Emotional support provided, all questions answered.  CHEMO AND BLOOD WORK ORDERED IN [place] FOR TODAY APPTS REVIEWED AND SCHEDULED DURING THIS VISIT

## 2024-11-27 NOTE — PHYSICAL EXAM
[Ambulatory and capable of all self care but unable to carry out any work activities] : Status 2- Ambulatory and capable of all self care but unable to carry out any work activities. Up and about more than 50% of waking hours [Normal] : affect appropriate [de-identified] : in wheelchair  [de-identified] : large right axillary and right cervical LN

## 2024-11-27 NOTE — HISTORY OF PRESENT ILLNESS
[Disease: _____________________] : Disease: [unfilled] [de-identified] :  This is a very pleasant 61-year-old female presented today for an evaluation for metastatic triple negative breast cancer.  Patient reports she noticed a large lump under her right arm and lymphedema in July 2023.  She went to the emergency room in [place] and workup showed metastatic triple negative breast cancer.  She underwent a right lymph node biopsy which was reportedly triple negative.  Biopsy report is not available for review.  She started palliative intent Taxol and pembrolizumab July 2023.  A PET/CT September 2023 showed mixed response, and she continued on the same regimen.  A PET/CT in January 2024 showed marked progression of disease in the bones.  Treatment was changed to carboplatin gemcitabine and pembrolizumab in February 2024.  She reportedly developed neutropenia and needed Neulasta for subsequent cycles.  PET/CT May 2024 showed stable disease.  Her last chemotherapy treatment was on 5/17/2024.  She decided to move to [place] close to family.  She has not had any treatment since then.  She reports she wanted to take a treatment break and has been taking holistic treatments.  She was disappointed that  neck and armpit masses grew during treatment break.  She was seen at [facility] last month and is referred here for continuation of care.  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  She is fasting 16 to 24 hours and has lost 40 pounds in the last 4 months.  She has significant right hip and left leg pain.  She is taking Dilaudid as needed and gabapentin.  Currently pain is 5 out of 10.  She walks with a cane and pain gets worse on walking after 1-2 blocks.  She had radiation to spine last year and developed significant toxicity and does not want to take radiation anymore. She is currently getting home PT. she reports skin discoloration from radiation and Neulasta.  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  Port has not been placed in 3 months.  She has double-lumen port.  She reports BRCA testing was done but she is not aware of results.  She lives with her parents and her daughter is supporting her.  8/23/2024 Patient presents for Trodelvy Cycle 1, Day 1. She is accompanied by her daughter. All questions asked/answered prior to initiation of treatment.  Patient underwent Port Study on 8/2124 at [facility].  Procedure Findings demonstrated a left-sided double port with tip of catheter in the SVC. The left port was accessed under sterile conditions with blood return and appropriate flushing was demonstrated. Hand injection of contrast demonstrates filling of port well and lumen without evidence of fibrin sheath. Unfortunately, multiple attempts were made with right port however it was unsuccessful. The plan per the Interventional Radiologist included: 1. Left port may be accessed and utilized for her upcoming treatment of chemotherapy. 2. Suggestion of possible replacement with a single lumen Mediport. Ms. Singh c/o of significant pain 10/10 on the pain scale. Reports she ran out of Hydromorphone and Gabapentin that was dispensed in the ER since last week. Ms. Singh reported that her pain was well controlled when taking Hydromorphone and Gabapentin as prescribed. Refills sent to [facility], and delivered at chairside. 4mg of Hydromorphone prescribed to be given at chairside prior to treatment. 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 Pathologic fractures in the right superior pubic ramus and left proximal fibula were noted on recent PET CT scan completed on 8/21/2024. Patient continues to take Eliquis daily (5 mg PO BID) for port r/t thrombus. Patient denies any s/s c/w easily bleeding or bruising.  Ms. Singh requested to speak with Nutrition today. Nutrition team was able to see the patient at the chairside. Appreciate note and recommendations. RTO: with each Cycle of Trodelvy, on Day 1. Appointments reviewed with patient and print out given.   8/28/2024 Patient presents today for repeat blood work and to complete [employer] Testing.  We again discussed the recent results of PTT/INR lab work that was significantly elevated on 8/23/2024. Patient was contacted on 8/26/2024 to initially discuss the elevated lab results. Ms. Singh reported that she was taking the Eliquis 5 mg PO BID as indicated. Upon questioning the patient further regarding the matter, she stated she ran out of the medication and needed a refill. Patient continues to deny as signs of bleeding or bruising. Results of PTT/INR today were WNL, and Eliquis 5mg PO BID was ordered to [facility].   Patient reported taking several supplements to include: Turmeric, Vitamin E, Black Seed Oil and several other non-FDA approved mixed/multi-vitamins up until Day 1 of treatment (8/23/2024). We discussed that these particular supplements may have unknown drug interactions with treatment and can also elevate both PTT & INR. The patient has since discontinued the supplements.   Discussed that [id number] testing is used to identify genomic alterations within the cancer's DNA that may make the patient eligible for a specific therapy/personalized treatment for her cancer. Patient verbalized consent and signed the necessary paperwork. Our office to convey results when they are made available.   Ms. Singh reports her pain is slightly improved since she started re-taking Dilaudid and Gabapentin as prescribed. Referral was made to Palliative Care, and an appointment was scheduled for 18 September 2024.   8/30/2024: day8 of Trodelvy,  She reports significant fatigue and altered taste x 3-4 days after chemo. She was able to function, maintained PO intake, weight stable. She had mild nausea, took Reglan, no vomiting, no diarrhea or mouth sores. No neuropathy, no fevers. Hb low, discussed PRBC tx PET baseline 8/2024 d/w her. She has several pathologic fractures. She doesnt want to go to ER, thinks they arent new. She has been hesitant about RT. Discussed importance of ortho onc consultation and pall RT for bone fractures and pain. No wt beaning until ortho consult.  She is on Eliquis 5 mg PO BID for port-related thrombus. PT/INR was significantly high, normalized on repeat labs  Has R shoulder, pelvis, LE pain somewhat controlled on Dilaudid every 4 hours, gabapentin Q3H. A. Pending palliative care appointment for pain management next month.  Does not have appetite but is taking small portions of meals. lso taking ensure Holding supplements 2 days before and after the chemo: Turmeric, Vitamin E, Black Seed Oil and several other non-FDA approved mixed/multi-vitamins up  [id number] testing sent during last visit Palliative Care, and an appointment was scheduled for 18 September 2024.   9/20/2024 Patient presents today for Cycle 2, Day 8 of Trodelvy.  She reports significant fatigue and altered taste x 3-4 days after chemo. She remains able to function, maintained PO intake, and her weight is stable. She continues to experience mild nausea, but it is improving, and no Reglan was needed after Cycle 2, Day 1. Denies fevers, vomiting, diarrhea, neuropathy or mouth sores.  Discussed low Hgb (8.1 g/dL), and possible need for PRBC transfusion. At this time, the patient denies being symptomatic and wishes to see if her bone marrow can recover on its own. She has been attending numerous appointments (Palliative, Orthopedics, RT) and is becoming overwhelmed.  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 Denies any SOB or palpitations. Ms. Singh has established care with Palliative Care on 18 September 2024 (follow-up scheduled in one week/Telemedicine Visit). Patient reports she enjoyed meeting this provider and was thankful for her recommendations to change the Hydromorphone from Q6 hours to Q4 hours. Additionally, she is now taking Tizanidine 2mg, 1 to 2 tablets every 8 hours for muscle spasms. She reports this medication has made a big difference and reports her overall pain is much more manageable/tolerable. Patient will also continue to take Gabapentin 300 mg TID. She is also now on a stricter bowel regimen taking Colace TID and Miralax daily. She is having BM's every other day.  Ms. Singh also met with Dr. Serena Bustos from Radiation Oncology on 9 September 2024. She reports being much more open to the idea of RT for pain relief and signed informed consent at the initial consultation. It was discussed at that visit a plan of care will be created once the patient established care with Dr. Lauren.  Ms. Singh established care with Dr. Subhash Lauren from Orthopedic Surgery. Patient was offered Right Hip Hemiarthroplasty and surgical stabilization of the Left femur. The patient declined surgical intervention and was instructed to return to the office in 1 month for repeat x-ray imaging.  Patient continues to hold supplements 2 days before and after the chemo: Turmeric, Vitamin E, Black Seed Oil and several other non-FDA approved mixed/multi-vitamins. RTO: with each Cycle of Trodelvy, on Day 8. [de-identified] : This is a very pleasant 61-year-old female diagnosed with metastatic triple negative breast cancer (metastatic disease to bones and lymph nodes) in July 2023, received Taxol Keytruda 7/202 3-1/2024, POD bones, Carboplatin Gemcitabine and Keytruda 2/202 4-5/2024, then she decided to take a treatment break.  She has been off treatment and reports worsening of palpable metastatic lymph nodes and worsening bone pain. Trodelvy initiated on 8/23/2024.  Palliative RT to the bone 9/2024 11/22/24 Patient had imaging done on 11/2024 . Upon review of PET/CT films, my interpretation is that patient has mixed response.  Some of the valentin disease and bone mets are improved but she has new and worsening bony lesions as well as fracture causing symptoms.  She has new lung metastases.  Imaging was reviewed in the tumor board and it was consensus to switch her treatment due to overall worsening disease. I reviewed these findings with the patient and daughter.  We will switch to eribulin She has worsening bilateral lower extremity swelling.  She had Doppler done recently to rule out DVT.  She is mostly sedentary due to pelvic fractures and pain.  She is taking pain medication for pain control.  Recommend Lasix for lower extremity swelling.  She is also referred to radiation oncology for radiation to painful bony left hip metastasis  She has malignant bone pain.  She is followed by palliative.  She is on Dilaudid and morphine.  She is able to shuffle slowly and get around.  Not able to bear weight. She had low hemoglobin which is stable now. she has met with orthopedic oncology and is refusing surgical fixation. 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 Denies any SOB or palpitations.  Port was replaced last week.  Lower extremity Doppler 11/2024 did not show evidence of DVT

## 2024-11-27 NOTE — HISTORY OF PRESENT ILLNESS
[Disease: _____________________] : Disease: [unfilled] [de-identified] :  This is a very pleasant 61-year-old female presented today for an evaluation for metastatic triple negative breast cancer.  Patient reports she noticed a large lump under her right arm and lymphedema in July 2023.  She went to the emergency room in [place] and workup showed metastatic triple negative breast cancer.  She underwent a right lymph node biopsy which was reportedly triple negative.  Biopsy report is not available for review.  She started palliative intent Taxol and pembrolizumab July 2023.  A PET/CT September 2023 showed mixed response, and she continued on the same regimen.  A PET/CT in January 2024 showed marked progression of disease in the bones.  Treatment was changed to carboplatin gemcitabine and pembrolizumab in February 2024.  She reportedly developed neutropenia and needed Neulasta for subsequent cycles.  PET/CT May 2024 showed stable disease.  Her last chemotherapy treatment was on 5/17/2024.  She decided to move to [place] close to family.  She has not had any treatment since then.  She reports she wanted to take a treatment break and has been taking holistic treatments.  She was disappointed that  neck and armpit masses grew during treatment break.  She was seen at [facility] last month and is referred here for continuation of care.  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  She is fasting 16 to 24 hours and has lost 40 pounds in the last 4 months.  She has significant right hip and left leg pain.  She is taking Dilaudid as needed and gabapentin.  Currently pain is 5 out of 10.  She walks with a cane and pain gets worse on walking after 1-2 blocks.  She had radiation to spine last year and developed significant toxicity and does not want to take radiation anymore. She is currently getting home PT. she reports skin discoloration from radiation and Neulasta.  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  Port has not been placed in 3 months.  She has double-lumen port.  She reports BRCA testing was done but she is not aware of results.  She lives with her parents and her daughter is supporting her.  8/23/2024 Patient presents for Trodelvy Cycle 1, Day 1. She is accompanied by her daughter. All questions asked/answered prior to initiation of treatment.  Patient underwent Port Study on 8/2124 at [facility].  Procedure Findings demonstrated a left-sided double port with tip of catheter in the SVC. The left port was accessed under sterile conditions with blood return and appropriate flushing was demonstrated. Hand injection of contrast demonstrates filling of port well and lumen without evidence of fibrin sheath. Unfortunately, multiple attempts were made with right port however it was unsuccessful. The plan per the Interventional Radiologist included: 1. Left port may be accessed and utilized for her upcoming treatment of chemotherapy. 2. Suggestion of possible replacement with a single lumen Mediport. Ms. Singh c/o of significant pain 10/10 on the pain scale. Reports she ran out of Hydromorphone and Gabapentin that was dispensed in the ER since last week. Ms. Singh reported that her pain was well controlled when taking Hydromorphone and Gabapentin as prescribed. Refills sent to [facility], and delivered at chairside. 4mg of Hydromorphone prescribed to be given at chairside prior to treatment. 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 Pathologic fractures in the right superior pubic ramus and left proximal fibula were noted on recent PET CT scan completed on 8/21/2024. Patient continues to take Eliquis daily (5 mg PO BID) for port r/t thrombus. Patient denies any s/s c/w easily bleeding or bruising.  Ms. Singh requested to speak with Nutrition today. Nutrition team was able to see the patient at the chairside. Appreciate note and recommendations. RTO: with each Cycle of Trodelvy, on Day 1. Appointments reviewed with patient and print out given.   8/28/2024 Patient presents today for repeat blood work and to complete [employer] Testing.  We again discussed the recent results of PTT/INR lab work that was significantly elevated on 8/23/2024. Patient was contacted on 8/26/2024 to initially discuss the elevated lab results. Ms. Singh reported that she was taking the Eliquis 5 mg PO BID as indicated. Upon questioning the patient further regarding the matter, she stated she ran out of the medication and needed a refill. Patient continues to deny as signs of bleeding or bruising. Results of PTT/INR today were WNL, and Eliquis 5mg PO BID was ordered to [facility].   Patient reported taking several supplements to include: Turmeric, Vitamin E, Black Seed Oil and several other non-FDA approved mixed/multi-vitamins up until Day 1 of treatment (8/23/2024). We discussed that these particular supplements may have unknown drug interactions with treatment and can also elevate both PTT & INR. The patient has since discontinued the supplements.   Discussed that [id number] testing is used to identify genomic alterations within the cancer's DNA that may make the patient eligible for a specific therapy/personalized treatment for her cancer. Patient verbalized consent and signed the necessary paperwork. Our office to convey results when they are made available.   Ms. Singh reports her pain is slightly improved since she started re-taking Dilaudid and Gabapentin as prescribed. Referral was made to Palliative Care, and an appointment was scheduled for 18 September 2024.   8/30/2024: day8 of Trodelvy,  She reports significant fatigue and altered taste x 3-4 days after chemo. She was able to function, maintained PO intake, weight stable. She had mild nausea, took Reglan, no vomiting, no diarrhea or mouth sores. No neuropathy, no fevers. Hb low, discussed PRBC tx PET baseline 8/2024 d/w her. She has several pathologic fractures. She doesnt want to go to ER, thinks they arent new. She has been hesitant about RT. Discussed importance of ortho onc consultation and pall RT for bone fractures and pain. No wt beaning until ortho consult.  She is on Eliquis 5 mg PO BID for port-related thrombus. PT/INR was significantly high, normalized on repeat labs  Has R shoulder, pelvis, LE pain somewhat controlled on Dilaudid every 4 hours, gabapentin Q3H. A. Pending palliative care appointment for pain management next month.  Does not have appetite but is taking small portions of meals. lso taking ensure Holding supplements 2 days before and after the chemo: Turmeric, Vitamin E, Black Seed Oil and several other non-FDA approved mixed/multi-vitamins up  [id number] testing sent during last visit Palliative Care, and an appointment was scheduled for 18 September 2024.   9/20/2024 Patient presents today for Cycle 2, Day 8 of Trodelvy.  She reports significant fatigue and altered taste x 3-4 days after chemo. She remains able to function, maintained PO intake, and her weight is stable. She continues to experience mild nausea, but it is improving, and no Reglan was needed after Cycle 2, Day 1. Denies fevers, vomiting, diarrhea, neuropathy or mouth sores.  Discussed low Hgb (8.1 g/dL), and possible need for PRBC transfusion. At this time, the patient denies being symptomatic and wishes to see if her bone marrow can recover on its own. She has been attending numerous appointments (Palliative, Orthopedics, RT) and is becoming overwhelmed.  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 Denies any SOB or palpitations. Ms. Singh has established care with Palliative Care on 18 September 2024 (follow-up scheduled in one week/Telemedicine Visit). Patient reports she enjoyed meeting this provider and was thankful for her recommendations to change the Hydromorphone from Q6 hours to Q4 hours. Additionally, she is now taking Tizanidine 2mg, 1 to 2 tablets every 8 hours for muscle spasms. She reports this medication has made a big difference and reports her overall pain is much more manageable/tolerable. Patient will also continue to take Gabapentin 300 mg TID. She is also now on a stricter bowel regimen taking Colace TID and Miralax daily. She is having BM's every other day.  Ms. Singh also met with Dr. Serena Bustos from Radiation Oncology on 9 September 2024. She reports being much more open to the idea of RT for pain relief and signed informed consent at the initial consultation. It was discussed at that visit a plan of care will be created once the patient established care with Dr. Lauren.  Ms. Singh established care with Dr. Subhash Lauren from Orthopedic Surgery. Patient was offered Right Hip Hemiarthroplasty and surgical stabilization of the Left femur. The patient declined surgical intervention and was instructed to return to the office in 1 month for repeat x-ray imaging.  Patient continues to hold supplements 2 days before and after the chemo: Turmeric, Vitamin E, Black Seed Oil and several other non-FDA approved mixed/multi-vitamins. RTO: with each Cycle of Trodelvy, on Day 8. [de-identified] : This is a very pleasant 61-year-old female diagnosed with metastatic triple negative breast cancer (metastatic disease to bones and lymph nodes) in July 2023, received Taxol Keytruda 7/202 3-1/2024, POD bones, Carboplatin Gemcitabine and Keytruda 2/202 4-5/2024, then she decided to take a treatment break.  She has been off treatment and reports worsening of palpable metastatic lymph nodes and worsening bone pain. Trodelvy initiated on 8/23/2024.  Palliative RT to the bone 9/2024 11/22/24 Patient had imaging done on 11/2024 . Upon review of PET/CT films, my interpretation is that patient has mixed response.  Some of the valentin disease and bone mets are improved but she has new and worsening bony lesions as well as fracture causing symptoms.  She has new lung metastases.  Imaging was reviewed in the tumor board and it was consensus to switch her treatment due to overall worsening disease. I reviewed these findings with the patient and daughter.  We will switch to eribulin She has worsening bilateral lower extremity swelling.  She had Doppler done recently to rule out DVT.  She is mostly sedentary due to pelvic fractures and pain.  She is taking pain medication for pain control.  Recommend Lasix for lower extremity swelling.  She is also referred to radiation oncology for radiation to painful bony left hip metastasis  She has malignant bone pain.  She is followed by palliative.  She is on Dilaudid and morphine.  She is able to shuffle slowly and get around.  Not able to bear weight. She had low hemoglobin which is stable now. she has met with orthopedic oncology and is refusing surgical fixation. 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 Denies any SOB or palpitations.  Port was replaced last week.  Lower extremity Doppler 11/2024 did not show evidence of DVT

## 2024-11-27 NOTE — ASSESSMENT
[FreeTextEntry1] : This is a very pleasant 61-year-old female diagnosed with metastatic triple negative breast cancer (metastatic disease to bones and lymph nodes) in July 2023, received Taxol Keytruda 7/202 3-1/2024, POD bones, Carboplatin Gemcitabine and Keytruda 2/202 4-5/2024, then she decided to take a treatment break.  She has been off treatment and reports worsening of palpable metastatic lymph nodes and worsening bone pain. Trodelvy initiated on 8/23/2024.     METASTATIC BREAST CA: Triple Negative (ER: Negative < 1%, MO: Negative < 1%, HER2: Negative, 1+ staining). Slide Review completed by [place] on 8/14/2024. Original biopsy site: Right Axilla. Biopsy completed at [facility] on 7/12/2023.    11/22/24 Patient had imaging done on 11/2024 . Upon review of PET/CT films, my interpretation is that patient has mixed response.  Some of the valentin disease and bone mets are improved but she has new and worsening bony lesions as well as fracture causing symptoms.  She has new lung metastases.  Imaging was reviewed in the tumor board and it was consensus to switch her treatment due to overall worsening disease. I reviewed these findings with the patient and daughter.  We will switch to eribulin She has worsening bilateral lower extremity swelling.  She had Doppler done recently to rule out DVT.  She is mostly sedentary due to pelvic fractures and pain.  She is taking pain medication for pain control.  Recommend Lasix for lower extremity swelling.  She is also referred to radiation oncology for radiation to painful bony left hip metastasis  She has malignant bone pain.  She is followed by palliative.  She is on Dilaudid and morphine.  She is able to shuffle slowly and get around.  Not able to bear weight. She had low hemoglobin which is stable now. she has met with orthopedic oncology and is refusing surgical fixation. 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 Denies any SOB or palpitations.  Port was replaced last week.  Lower extremity Doppler 11/2024 did not show evidence of DVT   CBC reviewed with the patient today to make sure she is not neutropenic from high-risk cancer therapy drug.  We will continue to monitor CBC every 2 to 4 weeks for intense drug monitoring. Patient is on cytotoxic chemotherapy and needs intensive monitoring for severe toxicity.  [id number] testing sent during last visit Plan to re-biopsy on progression   MALIGNANT BONE PAIN:  PET baseline 8/2024 d/w her. She has several pathologic fractures. Ms. Singh has established care with Palliative Care on 18 September 2024 (follow-up scheduled in one week/Telemedicine Visit). Patient reports she enjoyed meeting this provider and was thankful for her recommendations to change the Hydromorphone from Q6 hours to Q4 hours. Additionally, she is now taking Tizanidine 2mg, 1 to 2 tablets every 8 hours for muscle spasms. She reports this medication has made a big difference and reports her overall pain is much more manageable/tolerable. Patient will also continue to take Gabapentin 300 mg TID.   Ms. Singh also met with Dr. Serena Bustos from Radiation Oncology on 9 September 2024. She reports being much more open to the idea of RT for pain relief and signed informed consent at the initial consultation. It was discussed at that visit a plan of care will be created once the patient established care with Dr. Lauren.  Ms. Singh established care with Dr. Subhash Lauren from Orthopedic Surgery. Patient was offered Right Hip Hemiarthroplasty and surgical stabilization of the Left femur. The patient declined surgical intervention and was instructed to return to the office in 1 month for repeat x-ray imaging.    LYMPHADENOPATHY: Visible and palpable Right Cervical and Axillary lymphadenopathy. Mild lymphedema secondary. Will continue to monitor.  CHEMO INDUCED NEUROPATHY: Mild. Continue Gabapentin, 300 mg TID.   WEIGHT LOSS: Likely secondary to malignancy as well as intermittent fasting.  Recommend not to fast during chemotherapy as she will need calorie intake. Nutrition consulted on 8/23/2024; patient seen chairside during Trodelvy Cycle 1, Day 1. Appreciate note and recommendations.   PORT ISSUES: Patient continues to take Eliquis daily (5 mg PO BID) for port r/t thrombus.   GENETICS: Obtain BRCA test results to see if she is a candidate for Olaparib.  FATIGUE:  2/2 MALIGNANCY and treatment: encourage increase activity as tolerated. Mild fatigue tolerable/stable, energy waxes/wanes.  - Chemotherapy induced anemia- Grade 3. Recommend PRBC transfusion to maintain Hb> 8.  At this time, the patient denies being symptomatic and wishes to see if her bone marrow can recover on its own. She has been attending numerous appointments (Palliative, Orthopedics, RT) and is becoming overwhelmed.  - Risk for Chemotherapy induced diarrhea- Imodium PRN. Maintain PO hydration. BRAT diet - Risk for Chemotherapy induced N/V- Will get pre-medications with Emend, Dexamethasone and Aloxi. She will continue Dexamethasone for next 3 days for antinausea prophylaxis. She will use Reglan as needed.  - GF induced bone pain- Patient to take Claritin Days 2-7. - Chemotherapy induced dysgeusia, wt loss and fatigue: Encourage oral fluids, small frequent meals.  - Chemo induced neuropathy- continue to monitor. - Alopecia- prescription for wig given.  - Instructed to call office and go directly to the emergency room with fever more than 100.4, shaking chills, productive cough, sore throat, shortness of breath or urinary symptoms. Patient verbalized understanding and agreement. - Emotional support provided, all questions answered.  CHEMO AND BLOOD WORK ORDERED IN [place] FOR TODAY APPTS REVIEWED AND SCHEDULED DURING THIS VISIT

## 2024-12-02 NOTE — VITALS
[Maximal Pain Intensity: 10/10] : 10/10 [Least Pain Intensity: 6/10] : 6/10 [Pain Description/Quality: ___] : Pain description/quality: [unfilled] [Pain Duration: ___] : Pain duration: [unfilled] [Pain Location: ___] : Pain Location: [unfilled] [Pain Interferes with ADLs] : Pain interferes with activities of daily living. [Opioid] : opioid [Adjuvant (neuroleptics)] : adjuvant (neuroleptics) [60: Requires occasional assistance, but is able to care for most of his/her needs] : 60: Requires occasional assistance, but is able to care for most of his/her needs

## 2024-12-04 NOTE — REVIEW OF SYSTEMS
[Lower Ext Edema] : lower extremity edema [Muscle Weakness] : muscle weakness [Negative] : Respiratory [Skin Rash] : no skin rash [FreeTextEntry2] : appetite loss [FreeTextEntry9] : back, extremity pain

## 2024-12-04 NOTE — HISTORY OF PRESENT ILLNESS
[Home] : at home, [unfilled] , at the time of the visit. [Medical Office: ([facility])___] : at the medical office located in  [Verbal consent obtained from patient] : the patient, [unfilled] [FreeTextEntry1] :  Ms. Singh is a 61-year-old female with triple negative metastatic breast cancer and symptomatic osseous metastatic disease.   She presented with a right axillary mass and arm swelling in July 2023. She went to an emergency room in [place] and diagnosed with metastatic triple negative breast cancer. She received palliative Taxol and pembrolizumab in July 2023. She underwent a course of palliative radiation therapy to the lower spine, completing treatment in September 2023. Progression of disease was noted in January 2024, so treatment was switched to carboplatin gemcitabine and pembrolizumab in February 2024. In May 2024, she relocated from [place] to [place]. She has resumed treatment under the care of Dr. Arciniega.  CT angiogram on 7/14/24 showed extensive right axillary and supraclavicular adenopathy measuring up to 4.9 cm. CT abdomen and pelvis showed multiple lytic bone lesions involving L5, the sacrum, pelvic bones and right femoral head. PET/CT on 8/21/24 showed avid right supraclavicular, subpectoral, and axillary lymph nodes. Multiple lytic and soft tissue avid bone metastases were present associated with pathological fractures of the right superior pubic ramus and left proximal fibula. There were multiple lesions of the right breast and skin.  She started treatment with Trodelvy on 8/23/24.  She was admitted to the hospital for pain management and received a course of palliative radiation therapy to the left femur (2000cGy) and right pelvic bones (2000 cGy) from 9/26/2024 - 10/2/2024.  FDG PET CT on 11/19/24 showed decreased hypermetabolism of the right iliac and left distal femur. There was increased intensity and cortical breakthrough of the left iliac lesion. There was new FDG avid and enlarged right external iliac nodes and new left retroperitoneal nodes. There was interval development of pulmonary nodules and right hydronephrosis.  She noted less right hip and left leg pain after radiation therapy.  More recently, she developed increasing lower back pain radiating to her lower abdomen/pelvis and pain in the left hip region. She explains that this pain is "different" and very severe at times. She developed right leg swelling in the interim as well. She has not been unable to walk. The swelling started to affect the left leg too. She started Lasix but she hasn't noticed any change. She is taking MSER 30mg bid, hydromorphone 6mg 3-4 times per day and Tizanidine 1-2 times per day. She continues systemic therapy under the care of Dr. Arciniega and planning to change to a different therapy on Friday. She uses an ambulette to get to and from appointments.

## 2024-12-04 NOTE — HISTORY OF PRESENT ILLNESS
[Home] : at home, [unfilled] , at the time of the visit. [Medical Office: ([facility])___] : at the medical office located in  [Verbal consent obtained from patient] : the patient, [unfilled] [FreeTextEntry1] :  Ms. Singh is a 61-year-old female with triple negative metastatic breast cancer and symptomatic osseous metastatic disease.   She presented with a right axillary mass and arm swelling in July 2023. She went to an emergency room in [place] and diagnosed with metastatic triple negative breast cancer. She received palliative Taxol and pembrolizumab in July 2023. She underwent a course of palliative radiation therapy to the lower spine, completing treatment in September 2023. Progression of disease was noted in January 2024, so treatment was switched to carboplatin gemcitabine and pembrolizumab in February 2024. In May 2024, she relocated from [place] to [place]. She has resumed treatment under the care of Dr. Arciniega.  CT angiogram on 7/14/24 showed extensive right axillary and supraclavicular adenopathy measuring up to 4.9 cm. CT abdomen and pelvis showed multiple lytic bone lesions involving L5, the sacrum, pelvic bones and right femoral head. PET/CT on 8/21/24 showed avid right supraclavicular, subpectoral, and axillary lymph nodes. Multiple lytic and soft tissue avid bone metastases were present associated with pathological fractures of the right superior pubic ramus and left proximal fibula. There were multiple lesions of the right breast and skin.  She started treatment with Trodelvy on 8/23/24.  She was admitted to the hospital for pain management and received a course of palliative radiation therapy to the left femur (2000cGy) and right pelvic bones (2000 cGy) from 9/26/2024 - 10/2/2024.  FDG PET CT on 11/19/24 showed decreased hypermetabolism of the right iliac and left distal femur. There was increased intensity and cortical breakthrough of the left iliac lesion. There was new FDG avid and enlarged right external iliac nodes and new left retroperitoneal nodes. There was interval development of pulmonary nodules and right hydronephrosis.  She noted less right hip and left leg pain after radiation therapy.  More recently, she developed increasing lower back pain radiating to her lower abdomen/pelvis and pain in the left hip region. She explains that this pain is "different" and very severe at times. She developed right leg swelling in the interim as well. She has not been unable to walk. The swelling started to affect the left leg too. She started Lasix but she hasn't noticed any change. She is taking MSER 30mg bid, hydromorphone 6mg 3-4 times per day and Tizanidine 1-2 times per day. She continues systemic therapy under the care of Dr. Arciniega and planning to change to a different therapy on Friday. She uses an ambulette to get to and from appointments.

## 2024-12-04 NOTE — REASON FOR VISIT
[Post-Treatment Evaluation] : post-treatment evaluation for [Bone Metastasis] : bone metastasis [Re-evaluation] : re-evaluation for

## 2024-12-06 NOTE — PHYSICAL EXAM
[Ambulatory and capable of all self care but unable to carry out any work activities] : Status 2- Ambulatory and capable of all self care but unable to carry out any work activities. Up and about more than 50% of waking hours [Normal] : affect appropriate [de-identified] : in wheelchair  [de-identified] : large right axillary and right cervical LN [de-identified] : +cervical lymph nodes [de-identified] : bilateral edema prevalent in lower extremities [de-identified] : flat

## 2024-12-06 NOTE — HISTORY OF PRESENT ILLNESS
[FreeTextEntry1] : 62yo F presents for follow up, referred by oncology.  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   Oncology hx: She presented with a right axillary mass and arm swelling in July 2023. She went to an emergency room in [place] and diagnosed with metastatic triple negative breast cancer. She received palliative Taxol and pembrolizumab in July 2023. She underwent a course of palliative radiation therapy to the lower spine, completing treatment in September 2023. Progression of disease was noted in January 2024, so treatment was switched to carboplatin gemcitabine and pembrolizumab in February 2024.  In May 2024, she relocated from [place] to [place]. She has resumed treatment under the care of Dr. Arciniega.  CT angiogram on 7/14/24 showed extensive right axillary and supraclavicular adenopathy measuring up to 4.9 cm. CT abdomen and pelvis showed multiple lytic bone lesions involving L5, the sacrum, pelvic bones and right femoral head.  PET/CT on 8/21/24 showed avid right supraclavicular, subpectoral, and axillary lymph nodes. Multiple lytic and soft tissue avid bone metastases were present associated with pathological fractures of the right superior pubic ramus and left proximal fibula. There were multiple lesions of the right breast and skin.  She started treatment with Trodelvy on 8/23/24.  [facility] visit 9/4: She is having pain from her waist down since March 2024. The pain is most severe in the right hip region and the left lower leg. The pain is worse with weight bearing. She cannot walk without assistance. She is also having pain in her right shoulder, clavicle region when she moves her arm a certain way. She is taking hydromorphone 4mg every 6 hours and gabapentin 300mg tid, with some temporary relief. She will meet with orthopedic surgery, Dr. Lauren, next week.  Ortho visit 9/11: 61F w/ multifocal skeletal metastatic breast ca to bone. The primary areas involved are the right femoral neck/head, R ilium, L posterior acetabulum, L ischium, L femoral shaft, and R scapula. The current PET was not a full body study therefore I have rec'd a bone scan for further assessment and to establish a baseline of her current skeletal disease involvement. She is symptomatic with regards to the right hip which is at risk for developing a pathologic fracture. I would not recommend IMN at this stage given the lytic region in the femoral head. For this reason the best surgical option would be right hip hemiarthroplasty- a procedure which can be done electively or if she breaks at some point in the future. At this stage the main focus is on delivering chemotherapy, a treatment which would be halted if we proceed with surgery. For this reason, I think it's best to hold off on surgery for now while she gets systemic treatment, with the understanding that she will require hemiarthroplasty if she develops a fracture in the future. I have also recommended that she follow up with a general surgeon with regards to the right axillary adenopathy for further evaluation.    Onc 10/11/24: Patient is here now for cycle 3D1. She was admitted at [facility] with intractable pain. she received palliative RT. Pain level is 5-7 out of 10. She is followed by palliative. She is on Dilaudid and morphine. She is able to shuffle slowly and get around. Not able to bear weight.  She states that her appetite and fatigue has improved secondary to transient chemo break. Weight is stable. No neuropathy or fevers. I recommend to complete 2 additional cycles of Trodelvy followed by repeat scans.  She had low hemoglobin which is stable now. she has met with orthopedic oncology and is refusing pending surgical fixation. 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 Denies any SOB or palpitations. ----------------------------------------------------  Pt was initially referred to supportive oncology for symptom management. She is in a wheelchair, but usually ambulates with a walker. She describes pain located in R hip, radiates to R knee and anterior thigh. No numbness. Described as severe ache and spasms. Currently at 7/10. Dilaudid brings it down to 7/10. Last taken around 1pm today. Following a schedule with Dilaudid 4mg, taking at 1am, 7am, 1pm, 7pm. Relief lasts about 4 hours. The last 5 days, pain has significantly worsened. Heating pads provide some relief. The pain never goes below 7/10, and at this level she is unable to carry out ADLs, interact with family or socialize. She is also taking Gabapentin 300mg TID. Using Senna and Miralax. Had BM yesterday. Denies nausea, vomiting. Poor appetite due to pain. Poor sleep, laying in bed a lot and waking up due to pain. Difficult to express emotions due to the pain, remains quiet.  Social: Lives with mom (Kathryn) and dad (Henrik Chisholm), daughter (Irina Singh), and son. Moved from [place] 3 months ago. Worked as a nurse in [place] Advance Directives / Decision Makers: Father and daughter help with medical decisions  10/14/24 Interval History: She was recently at [facility] for uncontrolled pain and radiation treatment. She is S/p 5 fractions of radiation, ended on 10/2/24. During that admission, she was seen by palliative care team and started on Morphine ER 30mg q8h, and Dilaudid PO 4-6mg q4h PRN. She states her pain seemed to be well controlled up until 2 days ago where the pain in her hip/legs have worsened. She denies any recent trauma or fall. She is taking Dilaudid PO 4mg once in the morning and Tizanidine 2mg as needed, on average once or less per day. When she takes the Dialudid 4mg PO in the morning, she gets 1-2 hrs of relief. She was hesitant to take it any more times per day because she was concerned to mix it with her long-acting morphine. She also continues on Gabapentin 300mg TID. She continues on a bowel regimen with regular BMs. She has no other concerns today. Next chemo is this Friday.  10/21/24 Interval History: Ran out of Morphine ER 10/16. Taking Dilaudid PO 4mg 1.5 tab about 2-3x daily. She states since being off the Morphine ER, she feels she may not need to restart a long acting. She believes the radiation may be helping her pain and she doesn't need a long acting. She takes Claritin after Nulesta injections with relief for about 5 days. She is also taking Tizanidine 2mg about once daily with relief. Admits to constipation, taking Senna sporadically but now taking 3 tab at bedtime.  11/1/2024 Interval History: Since d/c the MSER she reports pain has risen, stating it rises to 10/10. She has been utilizing hydromorphone 6mg three to four times daily. She utilizes Tizanidine 2mg once to twice daily with limited relief. She states the pain lessens if she lays still. She reports pain to sacral stage III continues to offset weight, apply barrier creams and dressings. Pain from the pressure injury often wakes her at night. She is eating two meals daily, denies N/V/D. Today she presents chairside with swelling to RLE, + pedal pulses. She states she is compliant with Eliquis regimen.   11/8/2024 Interval History: Pt seen in treatment room, accompanied by daughter. She states her pain is better controlled with the resumption of MSER 30mg BID, hydromorphone 6mg PRN which she utilizes once to twice daily, tizanidine 2mg sparingly. She reports pain, in particular, to her RLE with movement. She rates the pain as 'ok' in general with exacerbations with movement, rating 10/10 on occasion. She states the hydromorphone works well in controlling these spikes in pain. She reports moderate pain to her pressure injury and reports it is 'healing nicely'. She continues to offset her weight, apply barrier cream and apply mepilex dressing regularly. She reports difficulty sleeping, no more than three hours at a time, attributes to positioning and healing pressure injury. Moderate swelling continues to DESTINEY LE, encouraged to ambulate, elevate, and maintain protein intake. She states oral intake is low, forces herself to eat. She states she has little appetite and altered taste. Defers medical cannabis at this time. Denies N/V, reports bowels are regular every 1-2 days with bowel regimen in place.   11/22/ Interval History: Pt seen in treatment room, accompanied by her daughter.  Recent PET revealed mixed response noting areas of POD. She states there will be an alteration to her chemotherapy regimen, unsure at this time. She c/w MSER 30mg BID and utilizing hydromorphone 6mg twice daily. She describes the pain often as an intermittent cramping pain to the RLE, relieved by pain regimen and tizanidine 2mg which she reports as most helpful for cramps. RLE continues to be edematous, will begin furosemide.  She continues w in home PT with passive exercises, she is unable to bear weight at this time. States pressure ulcer is healed at this time, continues to offset weight as often as possible. Appetite remains stable, bowels are regular. Mood is stable and positive.   I Stop Ref#: [id number]

## 2024-12-06 NOTE — PHYSICAL EXAM
[Ambulatory and capable of all self care but unable to carry out any work activities] : Status 2- Ambulatory and capable of all self care but unable to carry out any work activities. Up and about more than 50% of waking hours [Normal] : affect appropriate [de-identified] : in wheelchair  [de-identified] : large right axillary and right cervical LN [de-identified] : +cervical lymph nodes [de-identified] : bilateral edema prevalent in lower extremities [de-identified] : flat

## 2024-12-06 NOTE — HISTORY OF PRESENT ILLNESS
[Disease: _____________________] : Disease: [unfilled] [de-identified] :  This is a very pleasant 61-year-old female presented today for an evaluation for metastatic triple negative breast cancer.  Patient reports she noticed a large lump under her right arm and lymphedema in July 2023.  She went to the emergency room in [place] and workup showed metastatic triple negative breast cancer.  She underwent a right lymph node biopsy which was reportedly triple negative.  Biopsy report is not available for review.  She started palliative intent Taxol and pembrolizumab July 2023.  A PET/CT September 2023 showed mixed response, and she continued on the same regimen.  A PET/CT in January 2024 showed marked progression of disease in the bones.  Treatment was changed to carboplatin gemcitabine and pembrolizumab in February 2024.  She reportedly developed neutropenia and needed Neulasta for subsequent cycles.  PET/CT May 2024 showed stable disease.  Her last chemotherapy treatment was on 5/17/2024.  She decided to move to [place] close to family.  She has not had any treatment since then.  She reports she wanted to take a treatment break and has been taking holistic treatments.  She was disappointed that  neck and armpit masses grew during treatment break.  She was seen at [facility] last month and is referred here for continuation of care.  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  She is fasting 16 to 24 hours and has lost 40 pounds in the last 4 months.  She has significant right hip and left leg pain.  She is taking Dilaudid as needed and gabapentin.  Currently pain is 5 out of 10.  She walks with a cane and pain gets worse on walking after 1-2 blocks.  She had radiation to spine last year and developed significant toxicity and does not want to take radiation anymore. She is currently getting home PT. she reports skin discoloration from radiation and Neulasta.  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  Port has not been placed in 3 months.  She has double-lumen port.  She reports BRCA testing was done but she is not aware of results.  She lives with her parents and her daughter is supporting her.  8/23/2024 Patient presents for Trodelvy Cycle 1, Day 1. She is accompanied by her daughter. All questions asked/answered prior to initiation of treatment.  Patient underwent Port Study on 8/2124 at [facility].  Procedure Findings demonstrated a left-sided double port with tip of catheter in the SVC. The left port was accessed under sterile conditions with blood return and appropriate flushing was demonstrated. Hand injection of contrast demonstrates filling of port well and lumen without evidence of fibrin sheath. Unfortunately, multiple attempts were made with right port however it was unsuccessful. The plan per the Interventional Radiologist included: 1. Left port may be accessed and utilized for her upcoming treatment of chemotherapy. 2. Suggestion of possible replacement with a single lumen Mediport. Ms. Singh c/o of significant pain 10/10 on the pain scale. Reports she ran out of Hydromorphone and Gabapentin that was dispensed in the ER since last week. Ms. Singh reported that her pain was well controlled when taking Hydromorphone and Gabapentin as prescribed. Refills sent to [facility], and delivered at chairside. 4mg of Hydromorphone prescribed to be given at chairside prior to treatment. 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 Pathologic fractures in the right superior pubic ramus and left proximal fibula were noted on recent PET CT scan completed on 8/21/2024. Patient continues to take Eliquis daily (5 mg PO BID) for port r/t thrombus. Patient denies any s/s c/w easily bleeding or bruising.  Ms. Singh requested to speak with Nutrition today. Nutrition team was able to see the patient at the chairside. Appreciate note and recommendations. RTO: with each Cycle of Trodelvy, on Day 1. Appointments reviewed with patient and print out given.   8/28/2024 Patient presents today for repeat blood work and to complete [employer] Testing.  We again discussed the recent results of PTT/INR lab work that was significantly elevated on 8/23/2024. Patient was contacted on 8/26/2024 to initially discuss the elevated lab results. Ms. Singh reported that she was taking the Eliquis 5 mg PO BID as indicated. Upon questioning the patient further regarding the matter, she stated she ran out of the medication and needed a refill. Patient continues to deny as signs of bleeding or bruising. Results of PTT/INR today were WNL, and Eliquis 5mg PO BID was ordered to [facility].   Patient reported taking several supplements to include: Turmeric, Vitamin E, Black Seed Oil and several other non-FDA approved mixed/multi-vitamins up until Day 1 of treatment (8/23/2024). We discussed that these particular supplements may have unknown drug interactions with treatment and can also elevate both PTT & INR. The patient has since discontinued the supplements.   Discussed that [id number] testing is used to identify genomic alterations within the cancer's DNA that may make the patient eligible for a specific therapy/personalized treatment for her cancer. Patient verbalized consent and signed the necessary paperwork. Our office to convey results when they are made available.   Ms. Singh reports her pain is slightly improved since she started re-taking Dilaudid and Gabapentin as prescribed. Referral was made to Palliative Care, and an appointment was scheduled for 18 September 2024.   8/30/2024: day8 of Trodelvy,  She reports significant fatigue and altered taste x 3-4 days after chemo. She was able to function, maintained PO intake, weight stable. She had mild nausea, took Reglan, no vomiting, no diarrhea or mouth sores. No neuropathy, no fevers. Hb low, discussed PRBC tx PET baseline 8/2024 d/w her. She has several pathologic fractures. She doesnt want to go to ER, thinks they arent new. She has been hesitant about RT. Discussed importance of ortho onc consultation and pall RT for bone fractures and pain. No wt beaning until ortho consult.  She is on Eliquis 5 mg PO BID for port-related thrombus. PT/INR was significantly high, normalized on repeat labs  Has R shoulder, pelvis, LE pain somewhat controlled on Dilaudid every 4 hours, gabapentin Q3H. A. Pending palliative care appointment for pain management next month.  Does not have appetite but is taking small portions of meals. lso taking ensure Holding supplements 2 days before and after the chemo: Turmeric, Vitamin E, Black Seed Oil and several other non-FDA approved mixed/multi-vitamins up  [id number] testing sent during last visit Palliative Care, and an appointment was scheduled for 18 September 2024.   9/20/2024 Patient presents today for Cycle 2, Day 8 of Trodelvy.  She reports significant fatigue and altered taste x 3-4 days after chemo. She remains able to function, maintained PO intake, and her weight is stable. She continues to experience mild nausea, but it is improving, and no Reglan was needed after Cycle 2, Day 1. Denies fevers, vomiting, diarrhea, neuropathy or mouth sores.  Discussed low Hgb (8.1 g/dL), and possible need for PRBC transfusion. At this time, the patient denies being symptomatic and wishes to see if her bone marrow can recover on its own. She has been attending numerous appointments (Palliative, Orthopedics, RT) and is becoming overwhelmed.  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 Denies any SOB or palpitations. Ms. Singh has established care with Palliative Care on 18 September 2024 (follow-up scheduled in one week/Telemedicine Visit). Patient reports she enjoyed meeting this provider and was thankful for her recommendations to change the Hydromorphone from Q6 hours to Q4 hours. Additionally, she is now taking Tizanidine 2mg, 1 to 2 tablets every 8 hours for muscle spasms. She reports this medication has made a big difference and reports her overall pain is much more manageable/tolerable. Patient will also continue to take Gabapentin 300 mg TID. She is also now on a stricter bowel regimen taking Colace TID and Miralax daily. She is having BM's every other day.  Ms. Singh also met with Dr. Serena Bustos from Radiation Oncology on 9 September 2024. She reports being much more open to the idea of RT for pain relief and signed informed consent at the initial consultation. It was discussed at that visit a plan of care will be created once the patient established care with Dr. Lauren.  Ms. Singh established care with Dr. Subhash Lauren from Orthopedic Surgery. Patient was offered Right Hip Hemiarthroplasty and surgical stabilization of the Left femur. The patient declined surgical intervention and was instructed to return to the office in 1 month for repeat x-ray imaging.  Patient continues to hold supplements 2 days before and after the chemo: Turmeric, Vitamin E, Black Seed Oil and several other non-FDA approved mixed/multi-vitamins. RTO: with each Cycle of Trodelvy, on Day 8.  11/22/24 Patient had imaging done on 11/2024 . Upon review of PET/CT films, my interpretation is that patient has mixed response.  Some of the valentin disease and bone mets are improved but she has new and worsening bony lesions as well as fracture causing symptoms.  She has new lung metastases.  Imaging was reviewed in the tumor board and it was consensus to switch her treatment due to overall worsening disease. I reviewed these findings with the patient and daughter.  We will switch to eribulin She has worsening bilateral lower extremity swelling.  She had Doppler done recently to rule out DVT.  She is mostly sedentary due to pelvic fractures and pain.  She is taking pain medication for pain control.  Recommend Lasix for lower extremity swelling.  She is also referred to radiation oncology for radiation to painful bony left hip metastasis  She has malignant bone pain.  She is followed by palliative.  She is on Dilaudid and morphine.  She is able to shuffle slowly and get around.  Not able to bear weight. She had low hemoglobin which is stable now. she has met with orthopedic oncology and is refusing surgical fixation. 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 Denies any SOB or palpitations.  Port was replaced last week.  Lower extremity Doppler 11/2024 did not show evidence of DVT [de-identified] : This is a very pleasant 61-year-old female diagnosed with metastatic triple negative breast cancer (metastatic disease to bones and lymph nodes) in July 2023, received Taxol Keytruda 7/202 3-1/2024, POD bones, Carboplatin Gemcitabine and Keytruda 2/202 4-5/2024, then she decided to take a treatment break.  She has been off treatment and reports worsening of palpable metastatic lymph nodes and worsening bone pain. Trodelvy initiated on 8/23/2024.  Palliative RT to the bone 9/2024 12/6/2024: Patient presents for New Drug Eribulin, Day 1 Review of CBC revealed a Hgb of 7 g/dL. Discussed delay of treatment today given critical lab value/not meeting parameters to treat. PRBC infusion is necessary at this time. After much discussion surrounding the plan of care, it was suggested the patient be transferred to [facility] via Ambulance secondary to current physical state. The patient and daughter in agreement.  Patient reports she is no longer taking Eliquis OR Lasix daily, but rather every 2-3 days. The patient reports she is now taking Turmeric again because she prefers to be more homeopathic. Additionally, last prescription refill of Eliquis 5 mg refilled on 9/24/24 (30-day supply). Discussed importance of compliance with medication given history of port-related thrombus and hypercoagulable state given chemotherapy.  Ms. Singh reports she is not taking the Lasix as directed; believes it only helped a touch and wishes to seek referral to [facility] Lymphedema program. Swelling in bilateral lower extremities prevalent on PE. Lower extremity Doppler completed on 11/1/2024 did not show evidence of DVT.  Ms. Singh also reports some difficulty swallowing with mild edema being appreciated on the right side of her neck. Patient reports she almost contacted 911 this week secondary.  Ms. Singh is scheduled to undergo Palliative RT from the 19th through the 25th. Patient reports she is anxious about waiting until then given the level of pain she is experiencing secondary to bony metastases. Patient reports she has developed worsening LBP that radiates toward her right lower abdomen/pelvis. Additionally, she reports pain the now affects her left foot, specifically the dorsal aspect. She remains unable to walk, utilizing a wheelchair and rolling walker at home. Utilizing an ambulette to get to and from appointments.  Patient is UTD with Palliative Care. She is currently taking: MSER 30mg bid, hydromorphone 6mg 3-4 times per day and Tizanidine 1-2 times per day. Pain scale at today's appointment: 8/10.  RTC: Once released from [facility]. Will work with in-patient Oncology and schedule in office follow-up as appropriate.

## 2024-12-06 NOTE — ASSESSMENT
[FreeTextEntry1] : This is a very pleasant 61-year-old female diagnosed with metastatic triple negative breast cancer (metastatic disease to bones and lymph nodes) in July 2023, received Taxol Keytruda 7/202 3-1/2024, POD bones, Carboplatin Gemcitabine and Keytruda 2/202 4-5/2024, then she decided to take a treatment break.  She has been off treatment and reports worsening of palpable metastatic lymph nodes and worsening bone pain. Trodelvy initiated on 8/23/2024.     METASTATIC BREAST CA: Triple Negative (ER: Negative < 1%, WI: Negative < 1%, HER2: Negative, 1+ staining). Slide Review completed by [employer] on 8/14/2024. Original biopsy site: Right Axilla. Biopsy completed at [facility] on 7/12/2023.    12/6/2024 Patient presents for New Drug Eribulin, Day 1  Review of CBC revealed a Hgb of 7 g/dL. Discussed delay of treatment today given critical lab value/not meeting parameters to treat. PRBC infusion is necessary at this time. After much discussion surrounding the plan of care, it was suggested the patient be transferred to [facility] via Ambulance secondary to current physical state. The patient and daughter in agreement.   Last imaging: Patient had imaging done on 11/2024. Upon review of PET/CT films, my interpretation is that patient has mixed response.  Some of the valentin disease and bone metastases are improved but she has new and worsening bony lesions as well as fracture causing symptoms.  She has new lung metastases.  Imaging was reviewed in the tumor board, and it was consensus to switch her treatment due to overall worsening disease. I reviewed these findings with the patient and daughter.  Patient was switched to Eribulin Day1, Day 8 + ONPRO for support.   Patient reports she is no longer taking Eliquis OR Lasix daily, but rather every 2-3 days. The patient reports she is now taking Turmeric again because she prefers to be more homeopathic. Additionally, last prescription refill of Eliquis 5 mg refilled on 9/24/24 (30-day supply). Discussed importance of compliance with medication given history of port-related thrombus and hypercoagulable state given chemotherapy.   [id number] testing demonstrated no actionable mutations Plan to re-biopsy on progression.   MALIGNANT BONE PAIN:  PET baseline 8/2024 d/w her. She has several pathologic fractures. Ms. Singh has established care with Palliative Care on 18 September 2024. She is currently taking: MSER 30mg bid, hydromorphone 6mg 3-4 times per day and Tizanidine 1-2 times per day. Pain scale at today's appointment: 8/10.   PALLIATIVE RADIATION: Ms. Singh also met with Dr. Serena Bustos from Radiation Oncology on 9 September 2024. Ms. Singh was ultimately admitted to the hospital for pain management and received a course of palliative radiation therapy to the left femur (2000cGy) and right pelvic bones (2000 cGy) from 9/26/2024 - 10/2/2024.   Ms. Singh also established care with Dr. Subhash Lauren from Orthopedic Surgery. Patient was offered Right Hip Hemiarthroplasty and surgical stabilization of the Left femur. The patient declined surgical intervention and was instructed to return to the office in 1 month for repeat x-ray imaging (patient declines to return to Dr. Lauren's office). Ms. Singh is scheduled to undergo additional Palliative RT from the 19th through the 25th. Patient reports she is anxious about waiting until then given the level of pain she is experiencing secondary to bony metastases. Patient reports she has developed worsening LBP that radiates toward her right lower abdomen/pelvis. Additionally, she reports pain the now affects her left foot, specifically the dorsal aspect. She remains unable to walk, utilizing a wheelchair and rolling walker at home. Utilizing an ambulette to get to and from appointments.   LYMPHADENOPATHY: Visible and palpable Right Cervical and Axillary lymphadenopathy. Mild lymphedema secondary. Ms. Singh also reports some difficulty swallowing with mild edema being appreciated on the right side of her neck. Patient reports she almost contacted 911 this week (12/4/24) secondary.   CHEMOTHERAPY INDUCED NEUROPATHY: Mild. Continue Gabapentin, 300 mg TID.   GENETICS: Obtain BRCA test results to see if she is a candidate for Olaparib.  FATIGUE:  2/2 MALIGNANCY and treatment: encourage increase activity as tolerated. Mild fatigue tolerable/stable, energy waxes/wanes.  - Chemotherapy induced anemia- Grade 3. Recommend PRBC transfusion to maintain Hb> 8.   - Risk for Chemotherapy induced diarrhea- Imodium PRN. Maintain PO hydration. BRAT diet - Risk for Chemotherapy induced N/V- Will get pre-medications with Emend, Dexamethasone and Aloxi. She will continue Dexamethasone for next 3 days for antinausea prophylaxis. She will use Reglan as needed.  - GF induced bone pain- Patient to take Claritin Days 2-7. - Chemotherapy induced dysgeusia, weight loss and fatigue: Encourage oral fluids, small frequent meals.  - Chemo induced neuropathy- continue to monitor. - Alopecia- prescription for wig given.  - Emotional support provided, all questions answered.  CHEMO AND BLOOD WORK ORDERED IN [place] FOR TODAY APPTS REVIEWED AND SCHEDULED DURING THIS VISIT

## 2024-12-06 NOTE — DATA REVIEWED
[FreeTextEntry1] : PET Skull to Thigh (11/19/2024)  FINDINGS:  HEAD/NECK: Physiologic FDG activity in visualized brain, head, and neck.  THORAX: Decreased though persistent hypermetabolism to lymph nodes in the chest including the right prepectoral and axillary nodes, for reference:  -Decreased size of 4.1 x 3 cm Right axillary node, SUV 7.6, image 77, (previously measured 5.2 x 5 cm, SUV 16)  -Interval decreased size and FDG activity of other upper right hemithorax lymph nodes including currently 1.4 cm moderately intense right supraclavicular node, SUV 6.6 image 57, (previously 3 x 2.3 cm, SUV 18.7.)  Right chest wall port catheter's distal tip terminates in the right atrium.  LUNGS: NEW non-FDG avid pulmonary nodules, too small for resolution of PET/CT for detection including: medial 4 mm right middle lobe nodule, image 92; 5 mm perifissural nodule, image 85; 3 mm right upper/apical nodule, image 67; 3 mm anterior left upper lobe, image 61; 5 mm left lung base image 96.  PLEURA/PERICARDIUM: No abnormal FDG activity. No effusion.  HEPATOBILIARY/PANCREAS: Physiologic FDG activity.  For reference, normal liver demonstrates SUV mean 2.1, previously 2.3. Cholecystectomy.  SPLEEN: Physiologic FDG activity. Normal in size.  ADRENAL GLANDS: No abnormal FDG activity. No nodule.  KIDNEYS/URINARY BLADDER: Physiologic excreted FDG activity. Interval development of mild right hydronephrosis, cause of which is not identified. Cystocele.  REPRODUCTIVE ORGANS: No abnormal FDG activity. Uterus in situ with coarse calcifications.  ABDOMINOPELVIC LYMPH NODES/RETROPERITONEUM: New retroperitoneal mildly enlarged lymph nodes, including along the left periaortic chain, measuring up to 1 x 0.9 cm for left para-aortic node, image 146, SUV 4.4; Newly avid enlarged 1.1 x 2.3 cm right distal external iliac node, SUV 8.6, image 217.  ESOPHAGUS/STOMACH/BOWEL/PERITONEUM/MESENTERY: No abnormal FDG activity.  VESSELS: Unremarkable.  BONES/SOFT TISSUES: Multiple FDG avid bone lesions again visualized, some which have decreased intensity others with new uptake, for reference: Mildly avid right scapular uptake, SUV 6.6, previously 18.1, image 77; Decreasing hypermetabolism to both iliac wings, SUV 6.2 for right iliac tip, image 199, previously was SUV 13.9; as well as decreased intensity of the left iliac wing, however there is NEW osseous lucency with cortical breakthrough of the left iliac. Interval decreased hypermetabolism of the left distal femoral lesion.  Overall, there is new mild diffuse uptake throughout most of the osseous structures, for which anemia or stimulating factors. Sparing of a few lumbar vertebra, which may be secondary to post radiation changes, retrospectively stable.  Focal hypermetabolism corresponding to a nondisplaced right coracoid process fracture, appears acute/subacute.  Radiopharmaceutical contamination adjacent to medial upper thighs and posterior to left coccygeal region.  IMPRESSION: Compared to 8/21/2024 FDG PET/CT:  1. Interval mixed response to therapy (but with overall progression of disease). Areas of decreased activity include decreased hypermetabolism and decreased size of right hemithorax lymph nodes including axillary, prepectoral, and supraclavicular nodes.  2. A few previously hypermetabolic osseous regions have interval decreased intensity, however there is NEW osseous lucency and cortical breakthrough of LEFT iliac wing. New mostly diffuse uptake throughout the osseous structures, which may seen in the setting of anemia or granulocyte stimulating factors limiting sensitivity for lesion detection.  3. Mildly avid FDG avid nondisplaced acute versus subacute right coracoid process fracture, potential pathologic fracture is not excluded.  4. New FDG avid mildly enlarged distal right external iliac node, and NEW left retroperitoneal nodes concerning for potential new metastasis.  5. Interval development of NEW subcentimeter pulmonary nodules, the largest measuring 5 mm and nonavid but too small for resolution of PET/CT for detection. Findings raise concern for pulmonary metastasis; recommend interval follow-up chest CT consider chest CT imaging in 3 months.  6. Interval development of NEW mild right hydronephrosis, cause is not identified.  These findings were communicated via secure Take the Interview email to referring physician Dr. Minnie Arciniega.  --- End of Report ---

## 2024-12-06 NOTE — HISTORY OF PRESENT ILLNESS
[Disease: _____________________] : Disease: [unfilled] [de-identified] :  This is a very pleasant 61-year-old female presented today for an evaluation for metastatic triple negative breast cancer.  Patient reports she noticed a large lump under her right arm and lymphedema in July 2023.  She went to the emergency room in [place] and workup showed metastatic triple negative breast cancer.  She underwent a right lymph node biopsy which was reportedly triple negative.  Biopsy report is not available for review.  She started palliative intent Taxol and pembrolizumab July 2023.  A PET/CT September 2023 showed mixed response, and she continued on the same regimen.  A PET/CT in January 2024 showed marked progression of disease in the bones.  Treatment was changed to carboplatin gemcitabine and pembrolizumab in February 2024.  She reportedly developed neutropenia and needed Neulasta for subsequent cycles.  PET/CT May 2024 showed stable disease.  Her last chemotherapy treatment was on 5/17/2024.  She decided to move to [place] close to family.  She has not had any treatment since then.  She reports she wanted to take a treatment break and has been taking holistic treatments.  She was disappointed that  neck and armpit masses grew during treatment break.  She was seen at [facility] last month and is referred here for continuation of care.  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  She is fasting 16 to 24 hours and has lost 40 pounds in the last 4 months.  She has significant right hip and left leg pain.  She is taking Dilaudid as needed and gabapentin.  Currently pain is 5 out of 10.  She walks with a cane and pain gets worse on walking after 1-2 blocks.  She had radiation to spine last year and developed significant toxicity and does not want to take radiation anymore. She is currently getting home PT. she reports skin discoloration from radiation and Neulasta.  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  Port has not been placed in 3 months.  She has double-lumen port.  She reports BRCA testing was done but she is not aware of results.  She lives with her parents and her daughter is supporting her.  8/23/2024 Patient presents for Trodelvy Cycle 1, Day 1. She is accompanied by her daughter. All questions asked/answered prior to initiation of treatment.  Patient underwent Port Study on 8/2124 at [facility].  Procedure Findings demonstrated a left-sided double port with tip of catheter in the SVC. The left port was accessed under sterile conditions with blood return and appropriate flushing was demonstrated. Hand injection of contrast demonstrates filling of port well and lumen without evidence of fibrin sheath. Unfortunately, multiple attempts were made with right port however it was unsuccessful. The plan per the Interventional Radiologist included: 1. Left port may be accessed and utilized for her upcoming treatment of chemotherapy. 2. Suggestion of possible replacement with a single lumen Mediport. Ms. Singh c/o of significant pain 10/10 on the pain scale. Reports she ran out of Hydromorphone and Gabapentin that was dispensed in the ER since last week. Ms. Singh reported that her pain was well controlled when taking Hydromorphone and Gabapentin as prescribed. Refills sent to [facility], and delivered at chairside. 4mg of Hydromorphone prescribed to be given at chairside prior to treatment. 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 Pathologic fractures in the right superior pubic ramus and left proximal fibula were noted on recent PET CT scan completed on 8/21/2024. Patient continues to take Eliquis daily (5 mg PO BID) for port r/t thrombus. Patient denies any s/s c/w easily bleeding or bruising.  Ms. Singh requested to speak with Nutrition today. Nutrition team was able to see the patient at the chairside. Appreciate note and recommendations. RTO: with each Cycle of Trodelvy, on Day 1. Appointments reviewed with patient and print out given.   8/28/2024 Patient presents today for repeat blood work and to complete [employer] Testing.  We again discussed the recent results of PTT/INR lab work that was significantly elevated on 8/23/2024. Patient was contacted on 8/26/2024 to initially discuss the elevated lab results. Ms. Singh reported that she was taking the Eliquis 5 mg PO BID as indicated. Upon questioning the patient further regarding the matter, she stated she ran out of the medication and needed a refill. Patient continues to deny as signs of bleeding or bruising. Results of PTT/INR today were WNL, and Eliquis 5mg PO BID was ordered to [facility].   Patient reported taking several supplements to include: Turmeric, Vitamin E, Black Seed Oil and several other non-FDA approved mixed/multi-vitamins up until Day 1 of treatment (8/23/2024). We discussed that these particular supplements may have unknown drug interactions with treatment and can also elevate both PTT & INR. The patient has since discontinued the supplements.   Discussed that [id number] testing is used to identify genomic alterations within the cancer's DNA that may make the patient eligible for a specific therapy/personalized treatment for her cancer. Patient verbalized consent and signed the necessary paperwork. Our office to convey results when they are made available.   Ms. Singh reports her pain is slightly improved since she started re-taking Dilaudid and Gabapentin as prescribed. Referral was made to Palliative Care, and an appointment was scheduled for 18 September 2024.   8/30/2024: day8 of Trodelvy,  She reports significant fatigue and altered taste x 3-4 days after chemo. She was able to function, maintained PO intake, weight stable. She had mild nausea, took Reglan, no vomiting, no diarrhea or mouth sores. No neuropathy, no fevers. Hb low, discussed PRBC tx PET baseline 8/2024 d/w her. She has several pathologic fractures. She doesnt want to go to ER, thinks they arent new. She has been hesitant about RT. Discussed importance of ortho onc consultation and pall RT for bone fractures and pain. No wt beaning until ortho consult.  She is on Eliquis 5 mg PO BID for port-related thrombus. PT/INR was significantly high, normalized on repeat labs  Has R shoulder, pelvis, LE pain somewhat controlled on Dilaudid every 4 hours, gabapentin Q3H. A. Pending palliative care appointment for pain management next month.  Does not have appetite but is taking small portions of meals. lso taking ensure Holding supplements 2 days before and after the chemo: Turmeric, Vitamin E, Black Seed Oil and several other non-FDA approved mixed/multi-vitamins up  [id number] testing sent during last visit Palliative Care, and an appointment was scheduled for 18 September 2024.   9/20/2024 Patient presents today for Cycle 2, Day 8 of Trodelvy.  She reports significant fatigue and altered taste x 3-4 days after chemo. She remains able to function, maintained PO intake, and her weight is stable. She continues to experience mild nausea, but it is improving, and no Reglan was needed after Cycle 2, Day 1. Denies fevers, vomiting, diarrhea, neuropathy or mouth sores.  Discussed low Hgb (8.1 g/dL), and possible need for PRBC transfusion. At this time, the patient denies being symptomatic and wishes to see if her bone marrow can recover on its own. She has been attending numerous appointments (Palliative, Orthopedics, RT) and is becoming overwhelmed.  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 Denies any SOB or palpitations. Ms. Singh has established care with Palliative Care on 18 September 2024 (follow-up scheduled in one week/Telemedicine Visit). Patient reports she enjoyed meeting this provider and was thankful for her recommendations to change the Hydromorphone from Q6 hours to Q4 hours. Additionally, she is now taking Tizanidine 2mg, 1 to 2 tablets every 8 hours for muscle spasms. She reports this medication has made a big difference and reports her overall pain is much more manageable/tolerable. Patient will also continue to take Gabapentin 300 mg TID. She is also now on a stricter bowel regimen taking Colace TID and Miralax daily. She is having BM's every other day.  Ms. Singh also met with Dr. Serena Bustos from Radiation Oncology on 9 September 2024. She reports being much more open to the idea of RT for pain relief and signed informed consent at the initial consultation. It was discussed at that visit a plan of care will be created once the patient established care with Dr. Lauren.  Ms. Singh established care with Dr. Subhash Lauren from Orthopedic Surgery. Patient was offered Right Hip Hemiarthroplasty and surgical stabilization of the Left femur. The patient declined surgical intervention and was instructed to return to the office in 1 month for repeat x-ray imaging.  Patient continues to hold supplements 2 days before and after the chemo: Turmeric, Vitamin E, Black Seed Oil and several other non-FDA approved mixed/multi-vitamins. RTO: with each Cycle of Trodelvy, on Day 8.  11/22/24 Patient had imaging done on 11/2024 . Upon review of PET/CT films, my interpretation is that patient has mixed response.  Some of the valentin disease and bone mets are improved but she has new and worsening bony lesions as well as fracture causing symptoms.  She has new lung metastases.  Imaging was reviewed in the tumor board and it was consensus to switch her treatment due to overall worsening disease. I reviewed these findings with the patient and daughter.  We will switch to eribulin She has worsening bilateral lower extremity swelling.  She had Doppler done recently to rule out DVT.  She is mostly sedentary due to pelvic fractures and pain.  She is taking pain medication for pain control.  Recommend Lasix for lower extremity swelling.  She is also referred to radiation oncology for radiation to painful bony left hip metastasis  She has malignant bone pain.  She is followed by palliative.  She is on Dilaudid and morphine.  She is able to shuffle slowly and get around.  Not able to bear weight. She had low hemoglobin which is stable now. she has met with orthopedic oncology and is refusing surgical fixation. 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 Denies any SOB or palpitations.  Port was replaced last week.  Lower extremity Doppler 11/2024 did not show evidence of DVT [de-identified] : This is a very pleasant 61-year-old female diagnosed with metastatic triple negative breast cancer (metastatic disease to bones and lymph nodes) in July 2023, received Taxol Keytruda 7/202 3-1/2024, POD bones, Carboplatin Gemcitabine and Keytruda 2/202 4-5/2024, then she decided to take a treatment break.  She has been off treatment and reports worsening of palpable metastatic lymph nodes and worsening bone pain. Trodelvy initiated on 8/23/2024.  Palliative RT to the bone 9/2024 12/6/2024: Patient presents for New Drug Eribulin, Day 1 Review of CBC revealed a Hgb of 7 g/dL. Discussed delay of treatment today given critical lab value/not meeting parameters to treat. PRBC infusion is necessary at this time. After much discussion surrounding the plan of care, it was suggested the patient be transferred to [facility] via Ambulance secondary to current physical state. The patient and daughter in agreement.  Patient reports she is no longer taking Eliquis OR Lasix daily, but rather every 2-3 days. The patient reports she is now taking Turmeric again because she prefers to be more homeopathic. Additionally, last prescription refill of Eliquis 5 mg refilled on 9/24/24 (30-day supply). Discussed importance of compliance with medication given history of port-related thrombus and hypercoagulable state given chemotherapy.  Ms. Singh reports she is not taking the Lasix as directed; believes it only helped a touch and wishes to seek referral to [facility] Lymphedema program. Swelling in bilateral lower extremities prevalent on PE. Lower extremity Doppler completed on 11/1/2024 did not show evidence of DVT.  Ms. Singh also reports some difficulty swallowing with mild edema being appreciated on the right side of her neck. Patient reports she almost contacted 911 this week secondary.  Ms. Singh is scheduled to undergo Palliative RT from the 19th through the 25th. Patient reports she is anxious about waiting until then given the level of pain she is experiencing secondary to bony metastases. Patient reports she has developed worsening LBP that radiates toward her right lower abdomen/pelvis. Additionally, she reports pain the now affects her left foot, specifically the dorsal aspect. She remains unable to walk, utilizing a wheelchair and rolling walker at home. Utilizing an ambulette to get to and from appointments.  Patient is UTD with Palliative Care. She is currently taking: MSER 30mg bid, hydromorphone 6mg 3-4 times per day and Tizanidine 1-2 times per day. Pain scale at today's appointment: 8/10.  RTC: Once released from [facility]. Will work with in-patient Oncology and schedule in office follow-up as appropriate.

## 2024-12-06 NOTE — ASSESSMENT
[FreeTextEntry1] : This is a very pleasant 61-year-old female diagnosed with metastatic triple negative breast cancer (metastatic disease to bones and lymph nodes) in July 2023, received Taxol Keytruda 7/202 3-1/2024, POD bones, Carboplatin Gemcitabine and Keytruda 2/202 4-5/2024, then she decided to take a treatment break.  She has been off treatment and reports worsening of palpable metastatic lymph nodes and worsening bone pain. Trodelvy initiated on 8/23/2024.     METASTATIC BREAST CA: Triple Negative (ER: Negative < 1%, [place]: Negative < 1%, HER2: Negative, 1+ staining). Slide Review completed by [facility] on 8/14/2024. Original biopsy site: Right Axilla. Biopsy completed at [facility] on 7/12/2023.    12/6/2024 Patient presents for New Drug Eribulin, Day 1  Review of CBC revealed a Hgb of 7 g/dL. Discussed delay of treatment today given critical lab value/not meeting parameters to treat. PRBC infusion is necessary at this time. After much discussion surrounding the plan of care, it was suggested the patient be transferred to [facility] via Ambulance secondary to current physical state. The patient and daughter in agreement.   Last imaging: Patient had imaging done on 11/2024. Upon review of PET/CT films, my interpretation is that patient has mixed response.  Some of the valentin disease and bone metastases are improved but she has new and worsening bony lesions as well as fracture causing symptoms.  She has new lung metastases.  Imaging was reviewed in the tumor board, and it was consensus to switch her treatment due to overall worsening disease. I reviewed these findings with the patient and daughter.  Patient was switched to Eribulin Day1, Day 8 + ONPRO for support.   Patient reports she is no longer taking Eliquis OR Lasix daily, but rather every 2-3 days. The patient reports she is now taking Turmeric again because she prefers to be more homeopathic. Additionally, last prescription refill of Eliquis 5 mg refilled on 9/24/24 (30-day supply). Discussed importance of compliance with medication given history of port-related thrombus and hypercoagulable state given chemotherapy.   [id number] testing demonstrated no actionable mutations Plan to re-biopsy on progression.   MALIGNANT BONE PAIN:  PET baseline 8/2024 d/w her. She has several pathologic fractures. Ms. Singh has established care with Palliative Care on 18 September 2024. She is currently taking: MSER 30mg bid, hydromorphone 6mg 3-4 times per day and Tizanidine 1-2 times per day. Pain scale at today's appointment: 8/10.   PALLIATIVE RADIATION: Ms. Singh also met with Dr. Serena Bustos from Radiation Oncology on 9 September 2024. Ms. Singh was ultimately admitted to the hospital for pain management and received a course of palliative radiation therapy to the left femur (2000cGy) and right pelvic bones (2000 cGy) from 9/26/2024 - 10/2/2024.   Ms. Singh also established care with Dr. Subhash Lauren from Orthopedic Surgery. Patient was offered Right Hip Hemiarthroplasty and surgical stabilization of the Left femur. The patient declined surgical intervention and was instructed to return to the office in 1 month for repeat x-ray imaging (patient declines to return to Dr. Lauren's office). Ms. Singh is scheduled to undergo additional Palliative RT from the 19th through the 25th. Patient reports she is anxious about waiting until then given the level of pain she is experiencing secondary to bony metastases. Patient reports she has developed worsening LBP that radiates toward her right lower abdomen/pelvis. Additionally, she reports pain the now affects her left foot, specifically the dorsal aspect. She remains unable to walk, utilizing a wheelchair and rolling walker at home. Utilizing an ambulette to get to and from appointments.   LYMPHADENOPATHY: Visible and palpable Right Cervical and Axillary lymphadenopathy. Mild lymphedema secondary. Ms. Singh also reports some difficulty swallowing with mild edema being appreciated on the right side of her neck. Patient reports she almost contacted 911 this week (12/4/24) secondary.   CHEMOTHERAPY INDUCED NEUROPATHY: Mild. Continue Gabapentin, 300 mg TID.   GENETICS: Obtain BRCA test results to see if she is a candidate for Olaparib.  FATIGUE:  2/2 MALIGNANCY and treatment: encourage increase activity as tolerated. Mild fatigue tolerable/stable, energy waxes/wanes.  - Chemotherapy induced anemia- Grade 3. Recommend PRBC transfusion to maintain Hb> 8.   - Risk for Chemotherapy induced diarrhea- Imodium PRN. Maintain PO hydration. BRAT diet - Risk for Chemotherapy induced N/V- Will get pre-medications with Emend, Dexamethasone and Aloxi. She will continue Dexamethasone for next 3 days for antinausea prophylaxis. She will use Reglan as needed.  - GF induced bone pain- Patient to take Claritin Days 2-7. - Chemotherapy induced dysgeusia, weight loss and fatigue: Encourage oral fluids, small frequent meals.  - Chemo induced neuropathy- continue to monitor. - Alopecia- prescription for wig given.  - Emotional support provided, all questions answered.  CHEMO AND BLOOD WORK ORDERED IN [facility] FOR TODAY APPTS REVIEWED AND SCHEDULED DURING THIS VISIT

## 2024-12-06 NOTE — ASSESSMENT
[FreeTextEntry1] : 60yo F with:  # Metastatic triple negative breast cancer - Primary areas involved are the right femoral neck/head, R ilium, L posterior acetabulum, L ischium, L femoral shaft, and R scapula - S/p radiation while at [facility] - On chemotherapy  # Pain - Continue MSER 30mg BID - Continue Dilaudid PO 6mg - using twice daily - Continue tizanidine 2mg, taking twice tablet daily - Counseled on importance of maintaining bowel regularity in light of regular opioid use.   # B/l lower extremity Neuropathy  - Gabapentin 300mg TID  # Constipation - Has not been regimented with medication - Has chronic constipation - Instructed to take Senna two tab twice daily and Miralax twice daily  # Debility - Pt is physically limited due to malignancy and its sequelae  - Getting home care services  #LE Edema - DESTINEY LE Doppler US ordered R/O DVT -Remains on Eliquis 5mg daily for hx clot to port  # Encounter for Palliative Care - Explained the role of palliative care in enhancing quality of life in the setting of serious illness. Emotional support provided.     Follow up in 2 weeks. Instructed to call office with any questions or concerns.

## 2024-12-20 NOTE — PHYSICAL EXAM
[Ambulatory and capable of all self care but unable to carry out any work activities] : Status 2- Ambulatory and capable of all self care but unable to carry out any work activities. Up and about more than 50% of waking hours [Normal] : affect appropriate [de-identified] : +cervical lymph nodes [de-identified] : no exam, in wheelchair  [de-identified] : large right axillary and right cervical LN [de-identified] : bilateral edema prevalent in lower extremities [de-identified] : flat

## 2024-12-20 NOTE — PHYSICAL EXAM
[Ambulatory and capable of all self care but unable to carry out any work activities] : Status 2- Ambulatory and capable of all self care but unable to carry out any work activities. Up and about more than 50% of waking hours [Normal] : affect appropriate [de-identified] : +cervical lymph nodes [de-identified] : no exam, in wheelchair  [de-identified] : large right axillary and right cervical LN [de-identified] : bilateral edema prevalent in lower extremities [de-identified] : flat

## 2024-12-20 NOTE — ASSESSMENT
[FreeTextEntry1] : This is a very pleasant 61-year-old female diagnosed with metastatic triple negative breast cancer (metastatic disease to bones and lymph nodes) in July 2023, received Taxol Keytruda 7/202 3-1/2024, POD bones, Carboplatin Gemcitabine and Keytruda 2/202 4-5/2024, then she decided to take a treatment break.  She has been off treatment and reports worsening of palpable metastatic lymph nodes and worsening bone pain. Trodelvy initiated on 8/23/2024.     METASTATIC BREAST CA: Triple Negative (ER: Negative < 1%, [place]: Negative < 1%, HER2: Negative, 1+ staining). Slide Review completed by [employer] on 8/14/2024. Original biopsy site: Right Axilla. Biopsy completed at [facility] on 7/12/2023.    12/2024 Patient was admitted at [facility] 2 weeks ago with severe bone pain. She had repeat scans in the hospital which showed known osseous metastatic disease and impacted pathologic right femoral fracture.  Ortho oncology saw the patient but patient declined surgical intervention.  She received radiation therapy completed 12/13/2024. She also had severe bilateral lower extremity swelling. Workup showed no DVT, preserved EF.  Lower extremity  swelling is felt to be secondary to lymphedema.  She is currently at a rehab. She is unable to walk. due to debility and swelling.   Pain is reasonably well-controlled.  She is eating okay.  She is hoping to start chemotherapy ASAP.  We discussed 2 to 3 weeks of physical therapy to get stronger.  Will start eribulin January 6.  She is on dexamethasone. Recommend to taper over 2 to 3 weeks  She remains on Eliquis and Lasix.  Will review the med list when she comes in person Xgeva to decrease the risk of skeletal metS, will add on day 1.  Every 6 weeks Continue follow-up with palliative for pain control  [id number] testing demonstrated no actionable mutations Plan to re-biopsy on progression.   MALIGNANT BONE PAIN:  PET baseline 8/2024 d/w her. She has several pathologic fractures. Ms. Singh has established care with Palliative Care on 18 September 2024. She is currently taking: MSER 30mg bid, hydromorphone 6mg 3-4 times per day and Tizanidine 1-2 times per day. Pain scale at today's appointment: 8/10.    LYMPHADENOPATHY: Visible and palpable Right Cervical and Axillary lymphadenopathy. Mild lymphedema secondary. Ms. Singh also reports some difficulty swallowing with mild edema being appreciated on the right side of her neck. Patient reports she almost contacted 911 this week (12/4/24) secondary.   CHEMOTHERAPY INDUCED NEUROPATHY: Mild. Continue Gabapentin, 300 mg TID.   GENETICS: Obtain BRCA test results to see if she is a candidate for Olaparib.  FATIGUE:  2/2 MALIGNANCY and treatment: encourage increase activity as tolerated. Mild fatigue tolerable/stable, energy waxes/wanes.  - Chemotherapy induced anemia- Grade 3. Recommend PRBC transfusion to maintain Hb> 8.   - Risk for Chemotherapy induced diarrhea- Imodium PRN. Maintain PO hydration. BRAT diet - Risk for Chemotherapy induced N/V- Will get pre-medications with Emend, Dexamethasone and Aloxi. She will continue Dexamethasone for next 3 days for antinausea prophylaxis. She will use Reglan as needed.  - GF induced bone pain- Patient to take Claritin Days 2-7. - Chemotherapy induced dysgeusia, weight loss and fatigue: Encourage oral fluids, small frequent meals.  - Chemo induced neuropathy- continue to monitor. - Alopecia- prescription for wig given.  - Emotional support provided, all questions answered.  CHEMO AND BLOOD WORK ORDERED IN [facility] FOR TODAY APPTS REVIEWED AND SCHEDULED DURING THIS VISIT

## 2024-12-20 NOTE — PHYSICAL EXAM
[Ambulatory and capable of all self care but unable to carry out any work activities] : Status 2- Ambulatory and capable of all self care but unable to carry out any work activities. Up and about more than 50% of waking hours [Normal] : affect appropriate [de-identified] : +cervical lymph nodes [de-identified] : no exam, in wheelchair  [de-identified] : large right axillary and right cervical LN [de-identified] : bilateral edema prevalent in lower extremities [de-identified] : flat

## 2024-12-20 NOTE — HISTORY OF PRESENT ILLNESS
[Disease: _____________________] : Disease: [unfilled] [de-identified] :  This is a very pleasant 61-year-old female presented today for an evaluation for metastatic triple negative breast cancer.  Patient reports she noticed a large lump under her right arm and lymphedema in July 2023.  She went to the emergency room in [place] and workup showed metastatic triple negative breast cancer.  She underwent a right lymph node biopsy which was reportedly triple negative.  Biopsy report is not available for review.  She started palliative intent Taxol and pembrolizumab July 2023.  A PET/CT September 2023 showed mixed response, and she continued on the same regimen.  A PET/CT in January 2024 showed marked progression of disease in the bones.  Treatment was changed to carboplatin gemcitabine and pembrolizumab in February 2024.  She reportedly developed neutropenia and needed Neulasta for subsequent cycles.  PET/CT May 2024 showed stable disease.  Her last chemotherapy treatment was on 5/17/2024.  She decided to move to [place] close to family.  She has not had any treatment since then.  She reports she wanted to take a treatment break and has been taking holistic treatments.  She was disappointed that  neck and armpit masses grew during treatment break.  She was seen at [facility] last month and is referred here for continuation of care.  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  She is fasting 16 to 24 hours and has lost 40 pounds in the last 4 months.  She has significant right hip and left leg pain.  She is taking Dilaudid as needed and gabapentin.  Currently pain is 5 out of 10.  She walks with a cane and pain gets worse on walking after 1-2 blocks.  She had radiation to spine last year and developed significant toxicity and does not want to take radiation anymore. She is currently getting home PT. she reports skin discoloration from radiation and Neulasta.  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  Port has not been placed in 3 months.  She has double-lumen port.  She reports BRCA testing was done but she is not aware of results.  She lives with her parents and her daughter is supporting her.  8/23/2024 Patient presents for Trodelvy Cycle 1, Day 1. She is accompanied by her daughter. All questions asked/answered prior to initiation of treatment.  Patient underwent Port Study on 8/2124 at [facility].  Procedure Findings demonstrated a left-sided double port with tip of catheter in the SVC. The left port was accessed under sterile conditions with blood return and appropriate flushing was demonstrated. Hand injection of contrast demonstrates filling of port well and lumen without evidence of fibrin sheath. Unfortunately, multiple attempts were made with right port however it was unsuccessful. The plan per the Interventional Radiologist included: 1. Left port may be accessed and utilized for her upcoming treatment of chemotherapy. 2. Suggestion of possible replacement with a single lumen Mediport. Ms. Singh c/o of significant pain 10/10 on the pain scale. Reports she ran out of Hydromorphone and Gabapentin that was dispensed in the ER since last week. Ms. Singh reported that her pain was well controlled when taking Hydromorphone and Gabapentin as prescribed. Refills sent to [facility], and delivered at chairside. 4mg of Hydromorphone prescribed to be given at chairside prior to treatment. 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 Pathologic fractures in the right superior pubic ramus and left proximal fibula were noted on recent PET CT scan completed on 8/21/2024. Patient continues to take Eliquis daily (5 mg PO BID) for port r/t thrombus. Patient denies any s/s c/w easily bleeding or bruising.  Ms. Singh requested to speak with Nutrition today. Nutrition team was able to see the patient at the chairside. Appreciate note and recommendations. RTO: with each Cycle of Trodelvy, on Day 1. Appointments reviewed with patient and print out given.   8/28/2024 Patient presents today for repeat blood work and to complete [employer] Testing.  We again discussed the recent results of PTT/INR lab work that was significantly elevated on 8/23/2024. Patient was contacted on 8/26/2024 to initially discuss the elevated lab results. Ms. Singh reported that she was taking the Eliquis 5 mg PO BID as indicated. Upon questioning the patient further regarding the matter, she stated she ran out of the medication and needed a refill. Patient continues to deny as signs of bleeding or bruising. Results of PTT/INR today were WNL, and Eliquis 5mg PO BID was ordered to [facility].   Patient reported taking several supplements to include: Turmeric, Vitamin E, Black Seed Oil and several other non-FDA approved mixed/multi-vitamins up until Day 1 of treatment (8/23/2024). We discussed that these particular supplements may have unknown drug interactions with treatment and can also elevate both PTT & INR. The patient has since discontinued the supplements.   Discussed that [id number] testing is used to identify genomic alterations within the cancer's DNA that may make the patient eligible for a specific therapy/personalized treatment for her cancer. Patient verbalized consent and signed the necessary paperwork. Our office to convey results when they are made available.   Ms. Singh reports her pain is slightly improved since she started re-taking Dilaudid and Gabapentin as prescribed. Referral was made to Palliative Care, and an appointment was scheduled for 18 September 2024.   8/30/2024: day8 of Trodelvy,  She reports significant fatigue and altered taste x 3-4 days after chemo. She was able to function, maintained PO intake, weight stable. She had mild nausea, took Reglan, no vomiting, no diarrhea or mouth sores. No neuropathy, no fevers. Hb low, discussed PRBC tx PET baseline 8/2024 d/w her. She has several pathologic fractures. She doesnt want to go to ER, thinks they arent new. She has been hesitant about RT. Discussed importance of ortho onc consultation and pall RT for bone fractures and pain. No wt beaning until ortho consult.  She is on Eliquis 5 mg PO BID for port-related thrombus. PT/INR was significantly high, normalized on repeat labs  Has R shoulder, pelvis, LE pain somewhat controlled on Dilaudid every 4 hours, gabapentin Q3H. A. Pending palliative care appointment for pain management next month.  Does not have appetite but is taking small portions of meals. lso taking ensure Holding supplements 2 days before and after the chemo: Turmeric, Vitamin E, Black Seed Oil and several other non-FDA approved mixed/multi-vitamins up  [id number] testing sent during last visit Palliative Care, and an appointment was scheduled for 18 September 2024.   9/20/2024 Patient presents today for Cycle 2, Day 8 of Trodelvy.  She reports significant fatigue and altered taste x 3-4 days after chemo. She remains able to function, maintained PO intake, and her weight is stable. She continues to experience mild nausea, but it is improving, and no Reglan was needed after Cycle 2, Day 1. Denies fevers, vomiting, diarrhea, neuropathy or mouth sores.  Discussed low Hgb (8.1 g/dL), and possible need for PRBC transfusion. At this time, the patient denies being symptomatic and wishes to see if her bone marrow can recover on its own. She has been attending numerous appointments (Palliative, Orthopedics, RT) and is becoming overwhelmed.  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 Denies any SOB or palpitations. Ms. Singh has established care with Palliative Care on 18 September 2024 (follow-up scheduled in one week/Telemedicine Visit). Patient reports she enjoyed meeting this provider and was thankful for her recommendations to change the Hydromorphone from Q6 hours to Q4 hours. Additionally, she is now taking Tizanidine 2mg, 1 to 2 tablets every 8 hours for muscle spasms. She reports this medication has made a big difference and reports her overall pain is much more manageable/tolerable. Patient will also continue to take Gabapentin 300 mg TID. She is also now on a stricter bowel regimen taking Colace TID and Miralax daily. She is having BM's every other day.  Ms. Singh also met with Dr. Serena Bustos from Radiation Oncology on 9 September 2024. She reports being much more open to the idea of RT for pain relief and signed informed consent at the initial consultation. It was discussed at that visit a plan of care will be created once the patient established care with Dr. Lauren.  Ms. Singh established care with Dr. Subhash Lauren from Orthopedic Surgery. Patient was offered Right Hip Hemiarthroplasty and surgical stabilization of the Left femur. The patient declined surgical intervention and was instructed to return to the office in 1 month for repeat x-ray imaging.  Patient continues to hold supplements 2 days before and after the chemo: Turmeric, Vitamin E, Black Seed Oil and several other non-FDA approved mixed/multi-vitamins. RTO: with each Cycle of Trodelvy, on Day 8.  11/22/24 Patient had imaging done on 11/2024 . Upon review of PET/CT films, my interpretation is that patient has mixed response.  Some of the valentin disease and bone mets are improved but she has new and worsening bony lesions as well as fracture causing symptoms.  She has new lung metastases.  Imaging was reviewed in the tumor board and it was consensus to switch her treatment due to overall worsening disease. I reviewed these findings with the patient and daughter.  We will switch to eribulin She has worsening bilateral lower extremity swelling.  She had Doppler done recently to rule out DVT.  She is mostly sedentary due to pelvic fractures and pain.  She is taking pain medication for pain control.  Recommend Lasix for lower extremity swelling.  She is also referred to radiation oncology for radiation to painful bony left hip metastasis  She has malignant bone pain.  She is followed by palliative.  She is on Dilaudid and morphine.  She is able to shuffle slowly and get around.  Not able to bear weight. She had low hemoglobin which is stable now. she has met with orthopedic oncology and is refusing surgical fixation. 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 Denies any SOB or palpitations.  Port was replaced last week.  Lower extremity Doppler 11/2024 did not show evidence of DVT   12/6/2024: Patient presents for New Drug Eribulin, Day 1 Review of CBC revealed a Hgb of 7 g/dL. Discussed delay of treatment today given critical lab value/not meeting parameters to treat. PRBC infusion is necessary at this time. After much discussion surrounding the plan of care, it was suggested the patient be transferred to [facility] via Ambulance secondary to current physical state. The patient and daughter in agreement.  Patient reports she is no longer taking Eliquis OR Lasix daily, but rather every 2-3 days. The patient reports she is now taking Turmeric again because she prefers to be more homeopathic. Additionally, last prescription refill of Eliquis 5 mg refilled on 9/24/24 (30-day supply). Discussed importance of compliance with medication given history of port-related thrombus and hypercoagulable state given chemotherapy.  Ms. Singh reports she is not taking the Lasix as directed; believes it only helped a touch and wishes to seek referral to [facility] Lymphedema program. Swelling in bilateral lower extremities prevalent on PE. Lower extremity Doppler completed on 11/1/2024 did not show evidence of DVT.  Ms. Singh also reports some difficulty swallowing with mild edema being appreciated on the right side of her neck. Patient reports she almost contacted 911 this week secondary.  Ms. Singh is scheduled to undergo Palliative RT from the 19th through the 25th. Patient reports she is anxious about waiting until then given the level of pain she is experiencing secondary to bony metastases. Patient reports she has developed worsening LBP that radiates toward her right lower abdomen/pelvis. Additionally, she reports pain the now affects her left foot, specifically the dorsal aspect. She remains unable to walk, utilizing a wheelchair and rolling walker at home. Utilizing an ambulette to get to and from appointments.  Patient is UTD with Palliative Care. She is currently taking: MSER 30mg bid, hydromorphone 6mg 3-4 times per day and Tizanidine 1-2 times per day. Pain scale at today's appointment: 8/10.  RTC: Once released from [facility]. Will work with in-patient Oncology and schedule in office follow-up as appropriate.    [de-identified] : This is a very pleasant 61-year-old female diagnosed with metastatic triple negative breast cancer (metastatic disease to bones and lymph nodes) in July 2023, received Taxol Keytruda 7/202 3-1/2024, POD bones, Carboplatin Gemcitabine and Keytruda 2/202 4-5/2024, then she decided to take a treatment break.  She has been off treatment and reports worsening of palpable metastatic lymph nodes and worsening bone pain. Trodelvy initiated on 8/23/2024.  Palliative RT to the bone 9/2024 12/2024 Patient was admitted at [facility] 2 weeks ago with severe bone pain. She had repeat scans in the hospital which showed known osseous metastatic disease and impacted pathologic right femoral fracture.  Ortho oncology saw the patient but patient declined surgical intervention.  She received radiation therapy completed 12/13/2024. She also had severe bilateral lower extremity swelling. Workup showed no DVT, preserved EF.  Lower extremity  swelling is felt to be secondary to lymphedema.  She is currently at a rehab. She is unable to walk. due to debility and swelling.   Pain is reasonably well-controlled.  She is eating okay.  She is hoping to start chemotherapy ASAP.  We discussed 2 to 3 weeks of physical therapy to get stronger.  Will start eribulin January 6.  She is on dexamethasone. Recommend to taper over 2 to 3 weeks  She remains on Eliquis and Lasix.  Will review the med list when she comes in person Xgeva to decrease the risk of skeletal metS, will add on day 1.  Every 6 weeks Continue follow-up with palliative for pain control

## 2024-12-20 NOTE — HISTORY OF PRESENT ILLNESS
[Disease: _____________________] : Disease: [unfilled] [de-identified] :  This is a very pleasant 61-year-old female presented today for an evaluation for metastatic triple negative breast cancer.  Patient reports she noticed a large lump under her right arm and lymphedema in July 2023.  She went to the emergency room in [place] and workup showed metastatic triple negative breast cancer.  She underwent a right lymph node biopsy which was reportedly triple negative.  Biopsy report is not available for review.  She started palliative intent Taxol and pembrolizumab July 2023.  A PET/CT September 2023 showed mixed response, and she continued on the same regimen.  A PET/CT in January 2024 showed marked progression of disease in the bones.  Treatment was changed to carboplatin gemcitabine and pembrolizumab in February 2024.  She reportedly developed neutropenia and needed Neulasta for subsequent cycles.  PET/CT May 2024 showed stable disease.  Her last chemotherapy treatment was on 5/17/2024.  She decided to move to [place] close to family.  She has not had any treatment since then.  She reports she wanted to take a treatment break and has been taking holistic treatments.  She was disappointed that  neck and armpit masses grew during treatment break.  She was seen at [facility] last month and is referred here for continuation of care.  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  She is fasting 16 to 24 hours and has lost 40 pounds in the last 4 months.  She has significant right hip and left leg pain.  She is taking Dilaudid as needed and gabapentin.  Currently pain is 5 out of 10.  She walks with a cane and pain gets worse on walking after 1-2 blocks.  She had radiation to spine last year and developed significant toxicity and does not want to take radiation anymore. She is currently getting home PT. she reports skin discoloration from radiation and Neulasta.  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  Port has not been placed in 3 months.  She has double-lumen port.  She reports BRCA testing was done but she is not aware of results.  She lives with her parents and her daughter is supporting her.  8/23/2024 Patient presents for Trodelvy Cycle 1, Day 1. She is accompanied by her daughter. All questions asked/answered prior to initiation of treatment.  Patient underwent Port Study on 8/2124 at [facility].  Procedure Findings demonstrated a left-sided double port with tip of catheter in the SVC. The left port was accessed under sterile conditions with blood return and appropriate flushing was demonstrated. Hand injection of contrast demonstrates filling of port well and lumen without evidence of fibrin sheath. Unfortunately, multiple attempts were made with right port however it was unsuccessful. The plan per the Interventional Radiologist included: 1. Left port may be accessed and utilized for her upcoming treatment of chemotherapy. 2. Suggestion of possible replacement with a single lumen Mediport. Ms. Singh c/o of significant pain 10/10 on the pain scale. Reports she ran out of Hydromorphone and Gabapentin that was dispensed in the ER since last week. Ms. Singh reported that her pain was well controlled when taking Hydromorphone and Gabapentin as prescribed. Refills sent to [facility], and delivered at chairside. 4mg of Hydromorphone prescribed to be given at chairside prior to treatment. 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 Pathologic fractures in the right superior pubic ramus and left proximal fibula were noted on recent PET CT scan completed on 8/21/2024. Patient continues to take Eliquis daily (5 mg PO BID) for port r/t thrombus. Patient denies any s/s c/w easily bleeding or bruising.  Ms. Singh requested to speak with Nutrition today. Nutrition team was able to see the patient at the chairside. Appreciate note and recommendations. RTO: with each Cycle of Trodelvy, on Day 1. Appointments reviewed with patient and print out given.   8/28/2024 Patient presents today for repeat blood work and to complete [employer] Testing.  We again discussed the recent results of PTT/INR lab work that was significantly elevated on 8/23/2024. Patient was contacted on 8/26/2024 to initially discuss the elevated lab results. Ms. Singh reported that she was taking the Eliquis 5 mg PO BID as indicated. Upon questioning the patient further regarding the matter, she stated she ran out of the medication and needed a refill. Patient continues to deny as signs of bleeding or bruising. Results of PTT/INR today were WNL, and Eliquis 5mg PO BID was ordered to [facility].   Patient reported taking several supplements to include: Turmeric, Vitamin E, Black Seed Oil and several other non-FDA approved mixed/multi-vitamins up until Day 1 of treatment (8/23/2024). We discussed that these particular supplements may have unknown drug interactions with treatment and can also elevate both PTT & INR. The patient has since discontinued the supplements.   Discussed that [id number] testing is used to identify genomic alterations within the cancer's DNA that may make the patient eligible for a specific therapy/personalized treatment for her cancer. Patient verbalized consent and signed the necessary paperwork. Our office to convey results when they are made available.   Ms. Singh reports her pain is slightly improved since she started re-taking Dilaudid and Gabapentin as prescribed. Referral was made to Palliative Care, and an appointment was scheduled for 18 September 2024.   8/30/2024: day8 of Trodelvy,  She reports significant fatigue and altered taste x 3-4 days after chemo. She was able to function, maintained PO intake, weight stable. She had mild nausea, took Reglan, no vomiting, no diarrhea or mouth sores. No neuropathy, no fevers. Hb low, discussed PRBC tx PET baseline 8/2024 d/w her. She has several pathologic fractures. She doesnt want to go to ER, thinks they arent new. She has been hesitant about RT. Discussed importance of ortho onc consultation and pall RT for bone fractures and pain. No wt beaning until ortho consult.  She is on Eliquis 5 mg PO BID for port-related thrombus. PT/INR was significantly high, normalized on repeat labs  Has R shoulder, pelvis, LE pain somewhat controlled on Dilaudid every 4 hours, gabapentin Q3H. A. Pending palliative care appointment for pain management next month.  Does not have appetite but is taking small portions of meals. lso taking ensure Holding supplements 2 days before and after the chemo: Turmeric, Vitamin E, Black Seed Oil and several other non-FDA approved mixed/multi-vitamins up  [id number] testing sent during last visit Palliative Care, and an appointment was scheduled for 18 September 2024.   9/20/2024 Patient presents today for Cycle 2, Day 8 of Trodelvy.  She reports significant fatigue and altered taste x 3-4 days after chemo. She remains able to function, maintained PO intake, and her weight is stable. She continues to experience mild nausea, but it is improving, and no Reglan was needed after Cycle 2, Day 1. Denies fevers, vomiting, diarrhea, neuropathy or mouth sores.  Discussed low Hgb (8.1 g/dL), and possible need for PRBC transfusion. At this time, the patient denies being symptomatic and wishes to see if her bone marrow can recover on its own. She has been attending numerous appointments (Palliative, Orthopedics, RT) and is becoming overwhelmed.  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 Denies any SOB or palpitations. Ms. Singh has established care with Palliative Care on 18 September 2024 (follow-up scheduled in one week/Telemedicine Visit). Patient reports she enjoyed meeting this provider and was thankful for her recommendations to change the Hydromorphone from Q6 hours to Q4 hours. Additionally, she is now taking Tizanidine 2mg, 1 to 2 tablets every 8 hours for muscle spasms. She reports this medication has made a big difference and reports her overall pain is much more manageable/tolerable. Patient will also continue to take Gabapentin 300 mg TID. She is also now on a stricter bowel regimen taking Colace TID and Miralax daily. She is having BM's every other day.  Ms. Singh also met with Dr. Serena Bustos from Radiation Oncology on 9 September 2024. She reports being much more open to the idea of RT for pain relief and signed informed consent at the initial consultation. It was discussed at that visit a plan of care will be created once the patient established care with Dr. Lauren.  Ms. Singh established care with Dr. Subhash Lauren from Orthopedic Surgery. Patient was offered Right Hip Hemiarthroplasty and surgical stabilization of the Left femur. The patient declined surgical intervention and was instructed to return to the office in 1 month for repeat x-ray imaging.  Patient continues to hold supplements 2 days before and after the chemo: Turmeric, Vitamin E, Black Seed Oil and several other non-FDA approved mixed/multi-vitamins. RTO: with each Cycle of Trodelvy, on Day 8.  11/22/24 Patient had imaging done on 11/2024 . Upon review of PET/CT films, my interpretation is that patient has mixed response.  Some of the valentin disease and bone mets are improved but she has new and worsening bony lesions as well as fracture causing symptoms.  She has new lung metastases.  Imaging was reviewed in the tumor board and it was consensus to switch her treatment due to overall worsening disease. I reviewed these findings with the patient and daughter.  We will switch to eribulin She has worsening bilateral lower extremity swelling.  She had Doppler done recently to rule out DVT.  She is mostly sedentary due to pelvic fractures and pain.  She is taking pain medication for pain control.  Recommend Lasix for lower extremity swelling.  She is also referred to radiation oncology for radiation to painful bony left hip metastasis  She has malignant bone pain.  She is followed by palliative.  She is on Dilaudid and morphine.  She is able to shuffle slowly and get around.  Not able to bear weight. She had low hemoglobin which is stable now. she has met with orthopedic oncology and is refusing surgical fixation. 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 Denies any SOB or palpitations.  Port was replaced last week.  Lower extremity Doppler 11/2024 did not show evidence of DVT   12/6/2024: Patient presents for New Drug Eribulin, Day 1 Review of CBC revealed a Hgb of 7 g/dL. Discussed delay of treatment today given critical lab value/not meeting parameters to treat. PRBC infusion is necessary at this time. After much discussion surrounding the plan of care, it was suggested the patient be transferred to [facility] via Ambulance secondary to current physical state. The patient and daughter in agreement.  Patient reports she is no longer taking Eliquis OR Lasix daily, but rather every 2-3 days. The patient reports she is now taking Turmeric again because she prefers to be more homeopathic. Additionally, last prescription refill of Eliquis 5 mg refilled on 9/24/24 (30-day supply). Discussed importance of compliance with medication given history of port-related thrombus and hypercoagulable state given chemotherapy.  Ms. Singh reports she is not taking the Lasix as directed; believes it only helped a touch and wishes to seek referral to [facility]ymphed[facility] program. Swelling in bilateral lower extremities prevalent on PE. Lower extremity Doppler completed on 11/1/2024 did not show evidence of DVT.  Ms. Singh also reports some difficulty swallowing with mild edema being appreciated on the right side of her neck. Patient reports she almost contacted 911 this week secondary.  Ms. Singh is scheduled to undergo Palliative RT from the 19th through the 25th. Patient reports she is anxious about waiting until then given the level of pain she is experiencing secondary to bony metastases. Patient reports she has developed worsening LBP that radiates toward her right lower abdomen/pelvis. Additionally, she reports pain the now affects her left foot, specifically the dorsal aspect. She remains unable to walk, utilizing a wheelchair and rolling walker at home. Utilizing an ambulette to get to and from appointments.  Patient is UTD with Palliative Care. She is currently taking: MSER 30mg bid, hydromorphone 6mg 3-4 times per day and Tizanidine 1-2 times per day. Pain scale at today's appointment: 8/10.  RTC: Once released from [facility]. Will work with in-patient Oncology and schedule in office follow-up as appropriate.    [de-identified] : This is a very pleasant 61-year-old female diagnosed with metastatic triple negative breast cancer (metastatic disease to bones and lymph nodes) in July 2023, received Taxol Keytruda 7/202 3-1/2024, POD bones, Carboplatin Gemcitabine and Keytruda 2/202 4-5/2024, then she decided to take a treatment break.  She has been off treatment and reports worsening of palpable metastatic lymph nodes and worsening bone pain. Trodelvy initiated on 8/23/2024.  Palliative RT to the bone 9/2024 12/2024 Patient was admitted at [facility] 2 weeks ago with severe bone pain. She had repeat scans in the hospital which showed known osseous metastatic disease and impacted pathologic right femoral fracture.  Ortho oncology saw the patient but patient declined surgical intervention.  She received radiation therapy completed 12/13/2024. She also had severe bilateral lower extremity swelling. Workup showed no DVT, preserved EF.  Lower extremity  swelling is felt to be secondary to lymphedema.  She is currently at a rehab. She is unable to walk. due to debility and swelling.   Pain is reasonably well-controlled.  She is eating okay.  She is hoping to start chemotherapy ASAP.  We discussed 2 to 3 weeks of physical therapy to get stronger.  Will start eribulin January 6.  She is on dexamethasone. Recommend to taper over 2 to 3 weeks  She remains on Eliquis and Lasix.  Will review the med list when she comes in person Xgeva to decrease the risk of skeletal metS, will add on day 1.  Every 6 weeks Continue follow-up with palliative for pain control

## 2024-12-20 NOTE — REASON FOR VISIT
[Follow-Up Visit] : a follow-up [Family Member] : family member [Home] : at home, [unfilled] , at the time of the visit. [Medical Office: ([facility])___] : at the medical office located in  [Patient] : the patient [FreeTextEntry2] : metastatic breast ca

## 2024-12-20 NOTE — ASSESSMENT
[FreeTextEntry1] : This is a very pleasant 61-year-old female diagnosed with metastatic triple negative breast cancer (metastatic disease to bones and lymph nodes) in July 2023, received Taxol Keytruda 7/202 3-1/2024, POD bones, Carboplatin Gemcitabine and Keytruda 2/202 4-5/2024, then she decided to take a treatment break.  She has been off treatment and reports worsening of palpable metastatic lymph nodes and worsening bone pain. Trodelvy initiated on 8/23/2024.     METASTATIC BREAST CA: Triple Negative (ER: Negative < 1%, NE: Negative < 1%, HER2: Negative, 1+ staining). Slide Review completed by [employer] on 8/14/2024. Original biopsy site: Right Axilla. Biopsy completed at [facility] on 7/12/2023.    12/2024 Patient was admitted at [facility] 2 weeks ago with severe bone pain. She had repeat scans in the hospital which showed known osseous metastatic disease and impacted pathologic right femoral fracture.  Ortho oncology saw the patient but patient declined surgical intervention.  She received radiation therapy completed 12/13/2024. She also had severe bilateral lower extremity swelling. Workup showed no DVT, preserved EF.  Lower extremity  swelling is felt to be secondary to lymphedema.  She is currently at a rehab. She is unable to walk. due to debility and swelling.   Pain is reasonably well-controlled.  She is eating okay.  She is hoping to start chemotherapy ASAP.  We discussed 2 to 3 weeks of physical therapy to get stronger.  Will start eribulin January 6.  She is on dexamethasone. Recommend to taper over 2 to 3 weeks  She remains on Eliquis and Lasix.  Will review the med list when she comes in person Xgeva to decrease the risk of skeletal metS, will add on day 1.  Every 6 weeks Continue follow-up with palliative for pain control  [id number] testing demonstrated no actionable mutations Plan to re-biopsy on progression.   MALIGNANT BONE PAIN:  PET baseline 8/2024 d/w her. She has several pathologic fractures. Ms. Singh has established care with Palliative Care on 18 September 2024. She is currently taking: MSER 30mg bid, hydromorphone 6mg 3-4 times per day and Tizanidine 1-2 times per day. Pain scale at today's appointment: 8/10.    LYMPHADENOPATHY: Visible and palpable Right Cervical and Axillary lymphadenopathy. Mild lymphedema secondary. Ms. Singh also reports some difficulty swallowing with mild edema being appreciated on the right side of her neck. Patient reports she almost contacted 911 this week (12/4/24) secondary.   CHEMOTHERAPY INDUCED NEUROPATHY: Mild. Continue Gabapentin, 300 mg TID.   GENETICS: Obtain BRCA test results to see if she is a candidate for Olaparib.  FATIGUE:  2/2 MALIGNANCY and treatment: encourage increase activity as tolerated. Mild fatigue tolerable/stable, energy waxes/wanes.  - Chemotherapy induced anemia- Grade 3. Recommend PRBC transfusion to maintain Hb> 8.   - Risk for Chemotherapy induced diarrhea- Imodium PRN. Maintain PO hydration. BRAT diet - Risk for Chemotherapy induced N/V- Will get pre-medications with Emend, Dexamethasone and Aloxi. She will continue Dexamethasone for next 3 days for antinausea prophylaxis. She will use Reglan as needed.  - GF induced bone pain- Patient to take Claritin Days 2-7. - Chemotherapy induced dysgeusia, weight loss and fatigue: Encourage oral fluids, small frequent meals.  - Chemo induced neuropathy- continue to monitor. - Alopecia- prescription for wig given.  - Emotional support provided, all questions answered.  CHEMO AND BLOOD WORK ORDERED IN [place] FOR TODAY APPTS REVIEWED AND SCHEDULED DURING THIS VISIT

## 2024-12-20 NOTE — HISTORY OF PRESENT ILLNESS
[Disease: _____________________] : Disease: [unfilled] [de-identified] :  This is a very pleasant 61-year-old female presented today for an evaluation for metastatic triple negative breast cancer.  Patient reports she noticed a large lump under her right arm and lymphedema in July 2023.  She went to the emergency room in [place] and workup showed metastatic triple negative breast cancer.  She underwent a right lymph node biopsy which was reportedly triple negative.  Biopsy report is not available for review.  She started palliative intent Taxol and pembrolizumab July 2023.  A PET/CT September 2023 showed mixed response, and she continued on the same regimen.  A PET/CT in January 2024 showed marked progression of disease in the bones.  Treatment was changed to carboplatin gemcitabine and pembrolizumab in February 2024.  She reportedly developed neutropenia and needed Neulasta for subsequent cycles.  PET/CT May 2024 showed stable disease.  Her last chemotherapy treatment was on 5/17/2024.  She decided to move to [place] close to family.  She has not had any treatment since then.  She reports she wanted to take a treatment break and has been taking holistic treatments.  She was disappointed that  neck and armpit masses grew during treatment break.  She was seen at [facility] last month and is referred here for continuation of care.  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  She is fasting 16 to 24 hours and has lost 40 pounds in the last 4 months.  She has significant right hip and left leg pain.  She is taking Dilaudid as needed and gabapentin.  Currently pain is 5 out of 10.  She walks with a cane and pain gets worse on walking after 1-2 blocks.  She had radiation to spine last year and developed significant toxicity and does not want to take radiation anymore. She is currently getting home PT. she reports skin discoloration from radiation and Neulasta.  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  Port has not been placed in 3 months.  She has double-lumen port.  She reports BRCA testing was done but she is not aware of results.  She lives with her parents and her daughter is supporting her.  8/23/2024 Patient presents for Trodelvy Cycle 1, Day 1. She is accompanied by her daughter. All questions asked/answered prior to initiation of treatment.  Patient underwent Port Study on 8/2124 at [facility].  Procedure Findings demonstrated a left-sided double port with tip of catheter in the SVC. The left port was accessed under sterile conditions with blood return and appropriate flushing was demonstrated. Hand injection of contrast demonstrates filling of port well and lumen without evidence of fibrin sheath. Unfortunately, multiple attempts were made with right port however it was unsuccessful. The plan per the Interventional Radiologist included: 1. Left port may be accessed and utilized for her upcoming treatment of chemotherapy. 2. Suggestion of possible replacement with a single lumen Mediport. Ms. Singh c/o of significant pain 10/10 on the pain scale. Reports she ran out of Hydromorphone and Gabapentin that was dispensed in the ER since last week. Ms. Singh reported that her pain was well controlled when taking Hydromorphone and Gabapentin as prescribed. Refills sent to [facility], and delivered at chairside. 4mg of Hydromorphone prescribed to be given at chairside prior to treatment. 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 Pathologic fractures in the right superior pubic ramus and left proximal fibula were noted on recent PET CT scan completed on 8/21/2024. Patient continues to take Eliquis daily (5 mg PO BID) for port r/t thrombus. Patient denies any s/s c/w easily bleeding or bruising.  Ms. Singh requested to speak with Nutrition today. Nutrition team was able to see the patient at the chairside. Appreciate note and recommendations. RTO: with each Cycle of Trodelvy, on Day 1. Appointments reviewed with patient and print out given.   8/28/2024 Patient presents today for repeat blood work and to complete [employer] Testing.  We again discussed the recent results of PTT/INR lab work that was significantly elevated on 8/23/2024. Patient was contacted on 8/26/2024 to initially discuss the elevated lab results. Ms. Singh reported that she was taking the Eliquis 5 mg PO BID as indicated. Upon questioning the patient further regarding the matter, she stated she ran out of the medication and needed a refill. Patient continues to deny as signs of bleeding or bruising. Results of PTT/INR today were WNL, and Eliquis 5mg PO BID was ordered to [facility].   Patient reported taking several supplements to include: Turmeric, Vitamin E, Black Seed Oil and several other non-FDA approved mixed/multi-vitamins up until Day 1 of treatment (8/23/2024). We discussed that these particular supplements may have unknown drug interactions with treatment and can also elevate both PTT & INR. The patient has since discontinued the supplements.   Discussed that [id number] testing is used to identify genomic alterations within the cancer's DNA that may make the patient eligible for a specific therapy/personalized treatment for her cancer. Patient verbalized consent and signed the necessary paperwork. Our office to convey results when they are made available.   Ms. Singh reports her pain is slightly improved since she started re-taking Dilaudid and Gabapentin as prescribed. Referral was made to Palliative Care, and an appointment was scheduled for 18 September 2024.   8/30/2024: day8 of Trodelvy,  She reports significant fatigue and altered taste x 3-4 days after chemo. She was able to function, maintained PO intake, weight stable. She had mild nausea, took Reglan, no vomiting, no diarrhea or mouth sores. No neuropathy, no fevers. Hb low, discussed PRBC tx PET baseline 8/2024 d/w her. She has several pathologic fractures. She doesnt want to go to ER, thinks they arent new. She has been hesitant about RT. Discussed importance of ortho onc consultation and pall RT for bone fractures and pain. No wt beaning until ortho consult.  She is on Eliquis 5 mg PO BID for port-related thrombus. PT/INR was significantly high, normalized on repeat labs  Has R shoulder, pelvis, LE pain somewhat controlled on Dilaudid every 4 hours, gabapentin Q3H. A. Pending palliative care appointment for pain management next month.  Does not have appetite but is taking small portions of meals. lso taking ensure Holding supplements 2 days before and after the chemo: Turmeric, Vitamin E, Black Seed Oil and several other non-FDA approved mixed/multi-vitamins up  [id number] testing sent during last visit Palliative Care, and an appointment was scheduled for 18 September 2024.   9/20/2024 Patient presents today for Cycle 2, Day 8 of Trodelvy.  She reports significant fatigue and altered taste x 3-4 days after chemo. She remains able to function, maintained PO intake, and her weight is stable. She continues to experience mild nausea, but it is improving, and no Reglan was needed after Cycle 2, Day 1. Denies fevers, vomiting, diarrhea, neuropathy or mouth sores.  Discussed low Hgb (8.1 g/dL), and possible need for PRBC transfusion. At this time, the patient denies being symptomatic and wishes to see if her bone marrow can recover on its own. She has been attending numerous appointments (Palliative, Orthopedics, RT) and is becoming overwhelmed.  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 Denies any SOB or palpitations. Ms. Singh has established care with Palliative Care on 18 September 2024 (follow-up scheduled in one week/Telemedicine Visit). Patient reports she enjoyed meeting this provider and was thankful for her recommendations to change the Hydromorphone from Q6 hours to Q4 hours. Additionally, she is now taking Tizanidine 2mg, 1 to 2 tablets every 8 hours for muscle spasms. She reports this medication has made a big difference and reports her overall pain is much more manageable/tolerable. Patient will also continue to take Gabapentin 300 mg TID. She is also now on a stricter bowel regimen taking Colace TID and Miralax daily. She is having BM's every other day.  Ms. Singh also met with Dr. Serena Bustos from Radiation Oncology on 9 September 2024. She reports being much more open to the idea of RT for pain relief and signed informed consent at the initial consultation. It was discussed at that visit a plan of care will be created once the patient established care with Dr. Lauren.  Ms. Singh established care with Dr. Subhash Lauren from Orthopedic Surgery. Patient was offered Right Hip Hemiarthroplasty and surgical stabilization of the Left femur. The patient declined surgical intervention and was instructed to return to the office in 1 month for repeat x-ray imaging.  Patient continues to hold supplements 2 days before and after the chemo: Turmeric, Vitamin E, Black Seed Oil and several other non-FDA approved mixed/multi-vitamins. RTO: with each Cycle of Trodelvy, on Day 8.  11/22/24 Patient had imaging done on 11/2024 . Upon review of PET/CT films, my interpretation is that patient has mixed response.  Some of the valentin disease and bone mets are improved but she has new and worsening bony lesions as well as fracture causing symptoms.  She has new lung metastases.  Imaging was reviewed in the tumor board and it was consensus to switch her treatment due to overall worsening disease. I reviewed these findings with the patient and daughter.  We will switch to eribulin She has worsening bilateral lower extremity swelling.  She had Doppler done recently to rule out DVT.  She is mostly sedentary due to pelvic fractures and pain.  She is taking pain medication for pain control.  Recommend Lasix for lower extremity swelling.  She is also referred to radiation oncology for radiation to painful bony left hip metastasis  She has malignant bone pain.  She is followed by palliative.  She is on Dilaudid and morphine.  She is able to shuffle slowly and get around.  Not able to bear weight. She had low hemoglobin which is stable now. she has met with orthopedic oncology and is refusing surgical fixation. 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 Denies any SOB or palpitations.  Port was replaced last week.  Lower extremity Doppler 11/2024 did not show evidence of DVT   12/6/2024: Patient presents for New Drug Eribulin, Day 1 Review of CBC revealed a Hgb of 7 g/dL. Discussed delay of treatment today given critical lab value/not meeting parameters to treat. PRBC infusion is necessary at this time. After much discussion surrounding the plan of care, it was suggested the patient be transferred to [facility] via Ambulance secondary to current physical state. The patient and daughter in agreement.  Patient reports she is no longer taking Eliquis OR Lasix daily, but rather every 2-3 days. The patient reports she is now taking Turmeric again because she prefers to be more homeopathic. Additionally, last prescription refill of Eliquis 5 mg refilled on 9/24/24 (30-day supply). Discussed importance of compliance with medication given history of port-related thrombus and hypercoagulable state given chemotherapy.  Ms. Singh reports she is not taking the Lasix as directed; believes it only helped a touch and wishes to seek referral to [facility]. Swelling in bilateral lower extremities prevalent on PE. Lower extremity Doppler completed on 11/1/2024 did not show evidence of DVT.  Ms. Singh also reports some difficulty swallowing with mild edema being appreciated on the right side of her neck. Patient reports she almost contacted 911 this week secondary.  Ms. Singh is scheduled to undergo Palliative RT from the 19th through the 25th. Patient reports she is anxious about waiting until then given the level of pain she is experiencing secondary to bony metastases. Patient reports she has developed worsening LBP that radiates toward her right lower abdomen/pelvis. Additionally, she reports pain the now affects her left foot, specifically the dorsal aspect. She remains unable to walk, utilizing a wheelchair and rolling walker at home. Utilizing an ambulette to get to and from appointments.  Patient is UTD with Palliative Care. She is currently taking: MSER 30mg bid, hydromorphone 6mg 3-4 times per day and Tizanidine 1-2 times per day. Pain scale at today's appointment: 8/10.  RTC: Once released from [facility]. Will work with in-patient Oncology and schedule in office follow-up as appropriate.    [de-identified] : This is a very pleasant 61-year-old female diagnosed with metastatic triple negative breast cancer (metastatic disease to bones and lymph nodes) in July 2023, received Taxol Keytruda 7/202 3-1/2024, POD bones, Carboplatin Gemcitabine and Keytruda 2/202 4-5/2024, then she decided to take a treatment break.  She has been off treatment and reports worsening of palpable metastatic lymph nodes and worsening bone pain. Trodelvy initiated on 8/23/2024.  Palliative RT to the bone 9/2024 12/2024 Patient was admitted at [facility] 2 weeks ago with severe bone pain. She had repeat scans in the hospital which showed known osseous metastatic disease and impacted pathologic right femoral fracture.  Ortho oncology saw the patient but patient declined surgical intervention.  She received radiation therapy completed 12/13/2024. She also had severe bilateral lower extremity swelling. Workup showed no DVT, preserved EF.  Lower extremity  swelling is felt to be secondary to lymphedema.  She is currently at a rehab. She is unable to walk. due to debility and swelling.   Pain is reasonably well-controlled.  She is eating okay.  She is hoping to start chemotherapy ASAP.  We discussed 2 to 3 weeks of physical therapy to get stronger.  Will start eribulin January 6.  She is on dexamethasone. Recommend to taper over 2 to 3 weeks  She remains on Eliquis and Lasix.  Will review the med list when she comes in person Xgeva to decrease the risk of skeletal metS, will add on day 1.  Every 6 weeks Continue follow-up with palliative for pain control

## 2024-12-20 NOTE — REASON FOR VISIT
[Follow-Up Visit] : a follow-up [Family Member] : family member [Home] : at home, [unfilled] , at the time of the visit. [Medical Office: ([facility])___] : at the medical office located in  [Patient] : the patient [FreeTextEntry2] : metastatic breast ca

## 2024-12-20 NOTE — REASON FOR VISIT
[Follow-Up Visit] : a follow-up [Family Member] : family member [Home] : at home, [unfilled] , at the time of the visit. [Medical Office: ([facility])___] : at the medical office located in  [Patient] : the patient [FreeTextEntry2] : metastatic breast ca

## 2024-12-20 NOTE — ASSESSMENT
[FreeTextEntry1] : This is a very pleasant 61-year-old female diagnosed with metastatic triple negative breast cancer (metastatic disease to bones and lymph nodes) in July 2023, received Taxol Keytruda 7/202 3-1/2024, POD bones, Carboplatin Gemcitabine and Keytruda 2/202 4-5/2024, then she decided to take a treatment break.  She has been off treatment and reports worsening of palpable metastatic lymph nodes and worsening bone pain. Trodelvy initiated on 8/23/2024.     METASTATIC BREAST CA: Triple Negative (ER: Negative < 1%, [place]: Negative < 1%, HER2: Negative, 1+ staining). Slide Review completed by [email or web address] on 8/14/2024. Original biopsy site: Right Axilla. Biopsy completed at [facility] on 7/12/2023.    12/2024 Patient was admitted at [facility] 2 weeks ago with severe bone pain. She had repeat scans in the hospital which showed known osseous metastatic disease and impacted pathologic right femoral fracture.  Ortho oncology saw the patient but patient declined surgical intervention.  She received radiation therapy completed 12/13/2024. She also had severe bilateral lower extremity swelling. Workup showed no DVT, preserved EF.  Lower extremity  swelling is felt to be secondary to lymphedema.  She is currently at a rehab. She is unable to walk. due to debility and swelling.   Pain is reasonably well-controlled.  She is eating okay.  She is hoping to start chemotherapy ASAP.  We discussed 2 to 3 weeks of physical therapy to get stronger.  Will start eribulin January 6.  She is on dexamethasone. Recommend to taper over 2 to 3 weeks  She remains on Eliquis and Lasix.  Will review the med list when she comes in person Xgeva to decrease the risk of skeletal metS, will add on day 1.  Every 6 weeks Continue follow-up with palliative for pain control  [id number] testing demonstrated no actionable mutations Plan to re-biopsy on progression.   MALIGNANT BONE PAIN:  PET baseline 8/2024 d/w her. She has several pathologic fractures. Ms. Singh has established care with Palliative Care on 18 September 2024. She is currently taking: MSER 30mg bid, hydromorphone 6mg 3-4 times per day and Tizanidine 1-2 times per day. Pain scale at today's appointment: 8/10.    LYMPHADENOPATHY: Visible and palpable Right Cervical and Axillary lymphadenopathy. Mild lymphedema secondary. Ms. Singh also reports some difficulty swallowing with mild edema being appreciated on the right side of her neck. Patient reports she almost contacted 911 this week (12/4/24) secondary.   CHEMOTHERAPY INDUCED NEUROPATHY: Mild. Continue Gabapentin, 300 mg TID.   GENETICS: Obtain BRCA test results to see if she is a candidate for Olaparib.  FATIGUE:  2/2 MALIGNANCY and treatment: encourage increase activity as tolerated. Mild fatigue tolerable/stable, energy waxes/wanes.  - Chemotherapy induced anemia- Grade 3. Recommend PRBC transfusion to maintain Hb> 8.   - Risk for Chemotherapy induced diarrhea- Imodium PRN. Maintain PO hydration. BRAT diet - Risk for Chemotherapy induced N/V- Will get pre-medications with Emend, Dexamethasone and Aloxi. She will continue Dexamethasone for next 3 days for antinausea prophylaxis. She will use Reglan as needed.  - GF induced bone pain- Patient to take Claritin Days 2-7. - Chemotherapy induced dysgeusia, weight loss and fatigue: Encourage oral fluids, small frequent meals.  - Chemo induced neuropathy- continue to monitor. - Alopecia- prescription for wig given.  - Emotional support provided, all questions answered.  CHEMO AND BLOOD WORK ORDERED IN [facility] FOR TODAY APPTS REVIEWED AND SCHEDULED DURING THIS VISIT

## 2025-01-06 NOTE — ASSESSMENT
[FreeTextEntry1] : This is a very pleasant 61-year-old female diagnosed with metastatic triple negative breast cancer (metastatic disease to bones and lymph nodes) in July 2023, received Taxol Keytruda 7/202 3-1/2024, POD bones, Carboplatin Gemcitabine and Keytruda 2/202 4-5/2024, then she decided to take a treatment break.  She has been off treatment and reports worsening of palpable metastatic lymph nodes and worsening bone pain. Trodelvy initiated on 8/23/2024.     METASTATIC BREAST CA: Triple Negative (ER: Negative < 1%, [place]: Negative < 1%, HER2: Negative, 1+ staining). Slide Review completed by [employer] on 8/14/2024. Original biopsy site: Right Axilla. Biopsy completed at [facility] on 7/12/2023.   1/6/2025 Patient has completed radiation therapy last month.  She is currently in the rehab.  Still on dexamethasone.  Recommended to taper.  Here to start chemotherapy today.  Eribulin Risks benefits reviewed.  Informed consent obtained. Patient continues to have lower extremity swelling, DVT has been ruled out.  She is on Lasix with minimal improvement.  I recommend leg elevation, mobility as tolerated. She is on a stable pain regimen, followed by palliative Appetite good, weight stable Plan to  give 3-4 cycles of eribulin and repeat scans Xgeva to decrease the risk of skeletal metS, will add on day 1.  Every 6 weeks                                                                                                                                             Continue follow-up with palliative for pain control  [id number] testing demonstrated no actionable mutations Plan to re-biopsy on progression.   MALIGNANT BONE PAIN:  PET baseline 8/2024 d/w her. She has several pathologic fractures. Ms. Singh has established care with Palliative Care on 18 September 2024. She is currently taking: MSER 30mg bid, hydromorphone 6mg 3-4 times per day and Tizanidine 1-2 times per day.  continue pall care f/u   CHEMOTHERAPY INDUCED NEUROPATHY: Mild. Continue Gabapentin, 300 mg TID.   GENETICS: Obtain BRCA test results to see if she is a candidate for Olaparib. BRCA TO BE SENT 1/13/25  FATIGUE:  2/2 MALIGNANCY and treatment: encourage increase activity as tolerated. Mild fatigue tolerable/stable, energy waxes/wanes.  - Chemotherapy induced anemia- Grade 3. Recommend PRBC transfusion to maintain Hb> 8.   - Risk for Chemotherapy induced diarrhea- Imodium PRN. Maintain PO hydration. BRAT diet - Risk for Chemotherapy induced N/V- Will get pre-medications with Emend, Dexamethasone and Aloxi. She will continue Dexamethasone for next 3 days for antinausea prophylaxis. She will use Reglan as needed.  - GF induced bone pain- Patient to take Claritin Days 2-7. - Chemotherapy induced dysgeusia, weight loss and fatigue: Encourage oral fluids, small frequent meals.  - Chemo induced neuropathy- continue to monitor. - Alopecia- prescription for wig given.  - Emotional support provided, all questions answered.  CHEMO AND BLOOD WORK ORDERED IN [facility] FOR TODAY APPTS REVIEWED AND SCHEDULED DURING THIS VISIT

## 2025-01-06 NOTE — HISTORY OF PRESENT ILLNESS
[Disease: _____________________] : Disease: [unfilled] [de-identified] :  This is a very pleasant 61-year-old female presented today for an evaluation for metastatic triple negative breast cancer.  Patient reports she noticed a large lump under her right arm and lymphedema in July 2023.  She went to the emergency room in [place] and workup showed metastatic triple negative breast cancer.  She underwent a right lymph node biopsy which was reportedly triple negative.  Biopsy report is not available for review.  She started palliative intent Taxol and pembrolizumab July 2023.  A PET/CT September 2023 showed mixed response, and she continued on the same regimen.  A PET/CT in January 2024 showed marked progression of disease in the bones.  Treatment was changed to carboplatin gemcitabine and pembrolizumab in February 2024.  She reportedly developed neutropenia and needed Neulasta for subsequent cycles.  PET/CT May 2024 showed stable disease.  Her last chemotherapy treatment was on 5/17/2024.  She decided to move to [place] close to family.  She has not had any treatment since then.  She reports she wanted to take a treatment break and has been taking holistic treatments.  She was disappointed that  neck and armpit masses grew during treatment break.  She was seen at [facility] last month and is referred here for continuation of care.  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  She is fasting 16 to 24 hours and has lost 40 pounds in the last 4 months.  She has significant right hip and left leg pain.  She is taking Dilaudid as needed and gabapentin.  Currently pain is 5 out of 10.  She walks with a cane and pain gets worse on walking after 1-2 blocks.  She had radiation to spine last year and developed significant toxicity and does not want to take radiation anymore. She is currently getting home PT. she reports skin discoloration from radiation and Neulasta.  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  Port has not been placed in 3 months.  She has double-lumen port.  She reports BRCA testing was done but she is not aware of results.  She lives with her parents and her daughter is supporting her.  8/23/2024 Patient presents for Trodelvy Cycle 1, Day 1. She is accompanied by her daughter. All questions asked/answered prior to initiation of treatment.  Patient underwent Port Study on 8/2124 at [facility].  Procedure Findings demonstrated a left-sided double port with tip of catheter in the SVC. The left port was accessed under sterile conditions with blood return and appropriate flushing was demonstrated. Hand injection of contrast demonstrates filling of port well and lumen without evidence of fibrin sheath. Unfortunately, multiple attempts were made with right port however it was unsuccessful. The plan per the Interventional Radiologist included: 1. Left port may be accessed and utilized for her upcoming treatment of chemotherapy. 2. Suggestion of possible replacement with a single lumen Mediport. Ms. Singh c/o of significant pain 10/10 on the pain scale. Reports she ran out of Hydromorphone and Gabapentin that was dispensed in the ER since last week. Ms. Singh reported that her pain was well controlled when taking Hydromorphone and Gabapentin as prescribed. Refills sent to [facility], and delivered at chairside. 4mg of Hydromorphone prescribed to be given at chairside prior to treatment. 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 Pathologic fractures in the right superior pubic ramus and left proximal fibula were noted on recent PET CT scan completed on 8/21/2024. Patient continues to take Eliquis daily (5 mg PO BID) for port r/t thrombus. Patient denies any s/s c/w easily bleeding or bruising.  Ms. Singh requested to speak with Nutrition today. Nutrition team was able to see the patient at the chairside. Appreciate note and recommendations. RTO: with each Cycle of Trodelvy, on Day 1. Appointments reviewed with patient and print out given.   8/28/2024 Patient presents today for repeat blood work and to complete [employer] Testing.  We again discussed the recent results of PTT/INR lab work that was significantly elevated on 8/23/2024. Patient was contacted on 8/26/2024 to initially discuss the elevated lab results. Ms. Singh reported that she was taking the Eliquis 5 mg PO BID as indicated. Upon questioning the patient further regarding the matter, she stated she ran out of the medication and needed a refill. Patient continues to deny as signs of bleeding or bruising. Results of PTT/INR today were WNL, and Eliquis 5mg PO BID was ordered to [facility].   Patient reported taking several supplements to include: Turmeric, Vitamin E, Black Seed Oil and several other non-FDA approved mixed/multi-vitamins up until Day 1 of treatment (8/23/2024). We discussed that these particular supplements may have unknown drug interactions with treatment and can also elevate both PTT & INR. The patient has since discontinued the supplements.   Discussed that [id number] testing is used to identify genomic alterations within the cancer's DNA that may make the patient eligible for a specific therapy/personalized treatment for her cancer. Patient verbalized consent and signed the necessary paperwork. Our office to convey results when they are made available.   Ms. Singh reports her pain is slightly improved since she started re-taking Dilaudid and Gabapentin as prescribed. Referral was made to Palliative Care, and an appointment was scheduled for 18 September 2024.   8/30/2024: day8 of Trodelvy,  She reports significant fatigue and altered taste x 3-4 days after chemo. She was able to function, maintained PO intake, weight stable. She had mild nausea, took Reglan, no vomiting, no diarrhea or mouth sores. No neuropathy, no fevers. Hb low, discussed PRBC tx PET baseline 8/2024 d/w her. She has several pathologic fractures. She doesnt want to go to ER, thinks they arent new. She has been hesitant about RT. Discussed importance of ortho onc consultation and pall RT for bone fractures and pain. No wt beaning until ortho consult.  She is on Eliquis 5 mg PO BID for port-related thrombus. PT/INR was significantly high, normalized on repeat labs  Has R shoulder, pelvis, LE pain somewhat controlled on Dilaudid every 4 hours, gabapentin Q3H. A. Pending palliative care appointment for pain management next month.  Does not have appetite but is taking small portions of meals. lso taking ensure Holding supplements 2 days before and after the chemo: Turmeric, Vitamin E, Black Seed Oil and several other non-FDA approved mixed/multi-vitamins up  [id number] testing sent during last visit Palliative Care, and an appointment was scheduled for 18 September 2024.   9/20/2024 Patient presents today for Cycle 2, Day 8 of Trodelvy.  She reports significant fatigue and altered taste x 3-4 days after chemo. She remains able to function, maintained PO intake, and her weight is stable. She continues to experience mild nausea, but it is improving, and no Reglan was needed after Cycle 2, Day 1. Denies fevers, vomiting, diarrhea, neuropathy or mouth sores.  Discussed low Hgb (8.1 g/dL), and possible need for PRBC transfusion. At this time, the patient denies being symptomatic and wishes to see if her bone marrow can recover on its own. She has been attending numerous appointments (Palliative, Orthopedics, RT) and is becoming overwhelmed.  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 Denies any SOB or palpitations. Ms. Singh has established care with Palliative Care on 18 September 2024 (follow-up scheduled in one week/Telemedicine Visit). Patient reports she enjoyed meeting this provider and was thankful for her recommendations to change the Hydromorphone from Q6 hours to Q4 hours. Additionally, she is now taking Tizanidine 2mg, 1 to 2 tablets every 8 hours for muscle spasms. She reports this medication has made a big difference and reports her overall pain is much more manageable/tolerable. Patient will also continue to take Gabapentin 300 mg TID. She is also now on a stricter bowel regimen taking Colace TID and Miralax daily. She is having BM's every other day.  Ms. Singh also met with Dr. Serena Bustos from Radiation Oncology on 9 September 2024. She reports being much more open to the idea of RT for pain relief and signed informed consent at the initial consultation. It was discussed at that visit a plan of care will be created once the patient established care with Dr. Lauren.  Ms. Singh established care with Dr. Subhash Lauren from Orthopedic Surgery. Patient was offered Right Hip Hemiarthroplasty and surgical stabilization of the Left femur. The patient declined surgical intervention and was instructed to return to the office in 1 month for repeat x-ray imaging.  Patient continues to hold supplements 2 days before and after the chemo: Turmeric, Vitamin E, Black Seed Oil and several other non-FDA approved mixed/multi-vitamins. RTO: with each Cycle of Trodelvy, on Day 8.  11/22/24 Patient had imaging done on 11/2024 . Upon review of PET/CT films, my interpretation is that patient has mixed response.  Some of the valentin disease and bone mets are improved but she has new and worsening bony lesions as well as fracture causing symptoms.  She has new lung metastases.  Imaging was reviewed in the tumor board and it was consensus to switch her treatment due to overall worsening disease. I reviewed these findings with the patient and daughter.  We will switch to eribulin She has worsening bilateral lower extremity swelling.  She had Doppler done recently to rule out DVT.  She is mostly sedentary due to pelvic fractures and pain.  She is taking pain medication for pain control.  Recommend Lasix for lower extremity swelling.  She is also referred to radiation oncology for radiation to painful bony left hip metastasis  She has malignant bone pain.  She is followed by palliative.  She is on Dilaudid and morphine.  She is able to shuffle slowly and get around.  Not able to bear weight. She had low hemoglobin which is stable now. she has met with orthopedic oncology and is refusing surgical fixation. 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 Denies any SOB or palpitations.  Port was replaced last week.  Lower extremity Doppler 11/2024 did not show evidence of DVT   12/6/2024: Patient presents for New Drug Eribulin, Day 1 Review of CBC revealed a Hgb of 7 g/dL. Discussed delay of treatment today given critical lab value/not meeting parameters to treat. PRBC infusion is necessary at this time. After much discussion surrounding the plan of care, it was suggested the [facility] secondary to current physical state. The patient and daughter in agreement.  Patient reports she is no longer taking Eliquis OR Lasix daily, but rather every 2-3 days. The patient reports she is now taking Turmeric again because she prefers to be more homeopathic. Additionally, last prescription refill of Eliquis 5 mg refilled on 9/24/24 (30-day supply). Discussed importance of compliance with medication given history of port-related thrombus and hypercoagulable state given chemotherapy.  Ms. Singh reports she is not taking the Lasix as directed; believes it only helped a touch and wishes to seek referral to \[facility]\"" Lymphedema program. Swelling in bilateral lower extremities prevalent on PE. Lower extremity Doppler completed on 11/1/2024 did not show evidence of DVT.  Ms. Singh also reports some difficulty swallowing with mild edema being appreciated on the right side of her neck. Patient reports she almost contacted 911 this week secondary.  Ms. Singh is scheduled to undergo Palliative RT from the 19th through the 25th. Patient reports she is anxious about waiting until then given the level of pain she is experiencing secondary to bony metastases. Patient reports she has developed worsening LBP that radiates toward her right lower abdomen/pelvis. Additionally, she reports pain the now affects her left foot, specifically the dorsal aspect. She remains unable to walk, utilizing a wheelchair and rolling walker at home. Utilizing an ambulette to get to and from appointments.  Patient is UTD with Palliative Care. She is currently taking: MSER 30mg bid, hydromorphone 6mg 3-4 times per day and Tizanidine 1-2 times per day. Pain scale at today's appointment: 8/10.  RTC: Once released from [facility]. Will work with in-patient Oncology and schedule in office follow-up as appropriate.    12/2024 Patient was admitted [facility] ago with severe bone pain. She had repeat scans in the hospital which showed known osseous metastatic disease and impacted pathologic right femoral fracture.  Ortho oncology saw the patient but patient declined surgical intervention.  She received radiation therapy completed 12/13/2024. She also had severe bilateral lower extremity swelling. Workup showed no DVT, preserved EF.  Lower extremity  swelling is felt to be secondary to lymphedema.  She is currently at a rehab. She is unable to walk. due to debility and swelling.   Pain is reasonably well-controlled.  She is eating okay.  She is hoping to start chemotherapy ASAP.  We discussed 2 to 3 weeks of physical therapy to get stronger.  Will start eribulin January 6.  She is on dexamethasone. Recommend to taper over 2 to 3 weeks [de-identified] : This is a very pleasant 61-year-old female diagnosed with metastatic triple negative breast cancer (metastatic disease to bones and lymph nodes) in July 2023, received Taxol Keytruda 7/202 3-1/2024, POD bones, Carboplatin Gemcitabine and Keytruda 2/202 4-5/2024, then she decided to take a treatment break.  She has been off treatment and reports worsening of palpable metastatic lymph nodes and worsening bone pain. Trodelvy initiated on 8/23/2024.  Palliative RT to the bone 9/2024 1/6/2025 Patient has completed radiation therapy last month.  She is currently in the rehab.  Still on dexamethasone.  Recommended to taper.  Here to start chemotherapy today.  Eribulin Risks benefits reviewed.  Informed consent obtained. Patient continues to have lower extremity swelling, DVT has been ruled out.  She is on Lasix with minimal improvement.  I recommend leg elevation, mobility as tolerated. She is on a stable pain regimen, followed by palliative Appetite good, weight stable Plan to  give 3-4 cycles of eribulin and repeat scans Xgeva to decrease the risk of skeletal metS, will add on day 1.  Every 6 weeks                                                                                                                                             Continue follow-up with palliative for pain control

## 2025-01-06 NOTE — PHYSICAL EXAM
[Ambulatory and capable of all self care but unable to carry out any work activities] : Status 2- Ambulatory and capable of all self care but unable to carry out any work activities. Up and about more than 50% of waking hours [Normal] : affect appropriate [de-identified] : in wheelchair  [de-identified] : large right axillary and right cervical LN [de-identified] : bilateral edema prevalent in lower extremities [de-identified] : flat

## 2025-01-06 NOTE — PHYSICAL EXAM
[Ambulatory and capable of all self care but unable to carry out any work activities] : Status 2- Ambulatory and capable of all self care but unable to carry out any work activities. Up and about more than 50% of waking hours [Normal] : affect appropriate [de-identified] : in wheelchair  [de-identified] : large right axillary and right cervical LN [de-identified] : bilateral edema prevalent in lower extremities [de-identified] : flat

## 2025-01-06 NOTE — REASON FOR VISIT
[Follow-Up Visit] : a follow-up [Family Member] : family member [Formal Caregiver] : formal caregiver [FreeTextEntry2] : metastatic breast ca

## 2025-01-06 NOTE — HISTORY OF PRESENT ILLNESS
[Disease: _____________________] : Disease: [unfilled] [de-identified] :  This is a very pleasant 61-year-old female presented today for an evaluation for metastatic triple negative breast cancer.  Patient reports she noticed a large lump under her right arm and lymphedema in July 2023.  She went to the emergency room in [place] and workup showed metastatic triple negative breast cancer.  She underwent a right lymph node biopsy which was reportedly triple negative.  Biopsy report is not available for review.  She started palliative intent Taxol and pembrolizumab July 2023.  A PET/CT September 2023 showed mixed response, and she continued on the same regimen.  A PET/CT in January 2024 showed marked progression of disease in the bones.  Treatment was changed to carboplatin gemcitabine and pembrolizumab in February 2024.  She reportedly developed neutropenia and needed Neulasta for subsequent cycles.  PET/CT May 2024 showed stable disease.  Her last chemotherapy treatment was on 5/17/2024.  She decided to move to [place] close to family.  She has not had any treatment since then.  She reports she wanted to take a treatment break and has been taking holistic treatments.  She was disappointed that  neck and armpit masses grew during treatment break.  She was seen at [facility] last month and is referred here for continuation of care.  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  She is fasting 16 to 24 hours and has lost 40 pounds in the last 4 months.  She has significant right hip and left leg pain.  She is taking Dilaudid as needed and gabapentin.  Currently pain is 5 out of 10.  She walks with a cane and pain gets worse on walking after 1-2 blocks.  She had radiation to spine last year and developed significant toxicity and does not want to take radiation anymore. She is currently getting home PT. she reports skin discoloration from radiation and Neulasta.  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  Port has not been placed in 3 months.  She has double-lumen port.  She reports BRCA testing was done but she is not aware of results.  She lives with her parents and her daughter is supporting her.  8/23/2024 Patient presents for Trodelvy Cycle 1, Day 1. She is accompanied by her daughter. All questions asked/answered prior to initiation of treatment.  Patient underwent Port Study on 8/2124 at [facility].  Procedure Findings demonstrated a left-sided double port with tip of catheter in the SVC. The left port was accessed under sterile conditions with blood return and appropriate flushing was demonstrated. Hand injection of contrast demonstrates filling of port well and lumen without evidence of fibrin sheath. Unfortunately, multiple attempts were made with right port however it was unsuccessful. The plan per the Interventional Radiologist included: 1. Left port may be accessed and utilized for her upcoming treatment of chemotherapy. 2. Suggestion of possible replacement with a single lumen Mediport. Ms. Singh c/o of significant pain 10/10 on the pain scale. Reports she ran out of Hydromorphone and Gabapentin that was dispensed in the ER since last week. Ms. Singh reported that her pain was well controlled when taking Hydromorphone and Gabapentin as prescribed. Refills sent to [facility], and delivered at chairside. 4mg of Hydromorphone prescribed to be given at chairside prior to treatment. 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 Pathologic fractures in the right superior pubic ramus and left proximal fibula were noted on recent PET CT scan completed on 8/21/2024. Patient continues to take Eliquis daily (5 mg PO BID) for port r/t thrombus. Patient denies any s/s c/w easily bleeding or bruising.  Ms. Singh requested to speak with Nutrition today. Nutrition team was able to see the patient at the chairside. Appreciate note and recommendations. RTO: with each Cycle of Trodelvy, on Day 1. Appointments reviewed with patient and print out given.   8/28/2024 Patient presents today for repeat blood work and to complete [employer] Testing.  We again discussed the recent results of PTT/INR lab work that was significantly elevated on 8/23/2024. Patient was contacted on 8/26/2024 to initially discuss the elevated lab results. Ms. Singh reported that she was taking the Eliquis 5 mg PO BID as indicated. Upon questioning the patient further regarding the matter, she stated she ran out of the medication and needed a refill. Patient continues to deny as signs of bleeding or bruising. Results of PTT/INR today were WNL, and Eliquis 5mg PO BID was ordered to [facility].   Patient reported taking several supplements to include: Turmeric, Vitamin E, Black Seed Oil and several other non-FDA approved mixed/multi-vitamins up until Day 1 of treatment (8/23/2024). We discussed that these particular supplements may have unknown drug interactions with treatment and can also elevate both PTT & INR. The patient has since discontinued the supplements.   Discussed that [id number] testing is used to identify genomic alterations within the cancer's DNA that may make the patient eligible for a specific therapy/personalized treatment for her cancer. Patient verbalized consent and signed the necessary paperwork. Our office to convey results when they are made available.   Ms. Singh reports her pain is slightly improved since she started re-taking Dilaudid and Gabapentin as prescribed. Referral was made to Palliative Care, and an appointment was scheduled for 18 September 2024.   8/30/2024: day8 of Trodelvy,  She reports significant fatigue and altered taste x 3-4 days after chemo. She was able to function, maintained PO intake, weight stable. She had mild nausea, took Reglan, no vomiting, no diarrhea or mouth sores. No neuropathy, no fevers. Hb low, discussed PRBC tx PET baseline 8/2024 d/w her. She has several pathologic fractures. She doesnt want to go to ER, thinks they arent new. She has been hesitant about RT. Discussed importance of ortho onc consultation and pall RT for bone fractures and pain. No wt beaning until ortho consult.  She is on Eliquis 5 mg PO BID for port-related thrombus. PT/INR was significantly high, normalized on repeat labs  Has R shoulder, pelvis, LE pain somewhat controlled on Dilaudid every 4 hours, gabapentin Q3H. A. Pending palliative care appointment for pain management next month.  Does not have appetite but is taking small portions of meals. lso taking ensure Holding supplements 2 days before and after the chemo: Turmeric, Vitamin E, Black Seed Oil and several other non-FDA approved mixed/multi-vitamins up  [id number] testing sent during last visit Palliative Care, and an appointment was scheduled for 18 September 2024.   9/20/2024 Patient presents today for Cycle 2, Day 8 of Trodelvy.  She reports significant fatigue and altered taste x 3-4 days after chemo. She remains able to function, maintained PO intake, and her weight is stable. She continues to experience mild nausea, but it is improving, and no Reglan was needed after Cycle 2, Day 1. Denies fevers, vomiting, diarrhea, neuropathy or mouth sores.  Discussed low Hgb (8.1 g/dL), and possible need for PRBC transfusion. At this time, the patient denies being symptomatic and wishes to see if her bone marrow can recover on its own. She has been attending numerous appointments (Palliative, Orthopedics, RT) and is becoming overwhelmed.  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 Denies any SOB or palpitations. Ms. Singh has established care with Palliative Care on 18 September 2024 (follow-up scheduled in one week/Telemedicine Visit). Patient reports she enjoyed meeting this provider and was thankful for her recommendations to change the Hydromorphone from Q6 hours to Q4 hours. Additionally, she is now taking Tizanidine 2mg, 1 to 2 tablets every 8 hours for muscle spasms. She reports this medication has made a big difference and reports her overall pain is much more manageable/tolerable. Patient will also continue to take Gabapentin 300 mg TID. She is also now on a stricter bowel regimen taking Colace TID and Miralax daily. She is having BM's every other day.  Ms. Singh also met with Dr. Serena Bustos from Radiation Oncology on 9 September 2024. She reports being much more open to the idea of RT for pain relief and signed informed consent at the initial consultation. It was discussed at that visit a plan of care will be created once the patient established care with Dr. Lauren.  Ms. Singh established care with Dr. Subhash Lauren from Orthopedic Surgery. Patient was offered Right Hip Hemiarthroplasty and surgical stabilization of the Left femur. The patient declined surgical intervention and was instructed to return to the office in 1 month for repeat x-ray imaging.  Patient continues to hold supplements 2 days before and after the chemo: Turmeric, Vitamin E, Black Seed Oil and several other non-FDA approved mixed/multi-vitamins. RTO: with each Cycle of Trodelvy, on Day 8.  11/22/24 Patient had imaging done on 11/2024 . Upon review of PET/CT films, my interpretation is that patient has mixed response.  Some of the valentin disease and bone mets are improved but she has new and worsening bony lesions as well as fracture causing symptoms.  She has new lung metastases.  Imaging was reviewed in the tumor board and it was consensus to switch her treatment due to overall worsening disease. I reviewed these findings with the patient and daughter.  We will switch to eribulin She has worsening bilateral lower extremity swelling.  She had Doppler done recently to rule out DVT.  She is mostly sedentary due to pelvic fractures and pain.  She is taking pain medication for pain control.  Recommend Lasix for lower extremity swelling.  She is also referred to radiation oncology for radiation to painful bony left hip metastasis  She has malignant bone pain.  She is followed by palliative.  She is on Dilaudid and morphine.  She is able to shuffle slowly and get around.  Not able to bear weight. She had low hemoglobin which is stable now. she has met with orthopedic oncology and is refusing surgical fixation. 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 Denies any SOB or palpitations.  Port was replaced last week.  Lower extremity Doppler 11/2024 did not show evidence of DVT   12/6/2024: Patient presents for New Drug Eribulin, Day 1 Review of CBC revealed a Hgb of 7 g/dL. Discussed delay of treatment today given critical lab value/not meeting parameters to treat. PRBC infusion is necessary at this time. After much discussion surrounding the plan of care, it was suggested the patient be transferred to [facility] via Ambulance secondary to current physical state. The patient and daughter in agreement.  Patient reports she is no longer taking Eliquis OR Lasix daily, but rather every 2-3 days. The patient reports she is now taking Turmeric again because she prefers to be more homeopathic. Additionally, last prescription refill of Eliquis 5 mg refilled on 9/24/24 (30-day supply). Discussed importance of compliance with medication given history of port-related thrombus and hypercoagulable state given chemotherapy.  Ms. Singh reports she is not taking the Lasix as directed; believes it only helped a touch and wishes to seek referral to [facility] Lymphedema program. Swelling in bilateral lower extremities prevalent on PE. Lower extremity Doppler completed on 11/1/2024 did not show evidence of DVT.  Ms. Singh also reports some difficulty swallowing with mild edema being appreciated on the right side of her neck. Patient reports she almost contacted 911 this week secondary.  Ms. Singh is scheduled to undergo Palliative RT from the 19th through the 25th. Patient reports she is anxious about waiting until then given the level of pain she is experiencing secondary to bony metastases. Patient reports she has developed worsening LBP that radiates toward her right lower abdomen/pelvis. Additionally, she reports pain the now affects her left foot, specifically the dorsal aspect. She remains unable to walk, utilizing a wheelchair and rolling walker at home. Utilizing an ambulette to get to and from appointments.  Patient is UTD with Palliative Care. She is currently taking: MSER 30mg bid, hydromorphone 6mg 3-4 times per day and Tizanidine 1-2 times per day. Pain scale at today's appointment: 8/10.  RTC: Once released from [facility]. Will work with in-patient Oncology and schedule in office follow-up as appropriate.    12/2024 Patient was admitted at [facility] 2 weeks ago with severe bone pain. She had repeat scans in the hospital which showed known osseous metastatic disease and impacted pathologic right femoral fracture.  Ortho oncology saw the patient but patient declined surgical intervention.  She received radiation therapy completed 12/13/2024. She also had severe bilateral lower extremity swelling. Workup showed no DVT, preserved EF.  Lower extremity  swelling is felt to be secondary to lymphedema.  She is currently at a rehab. She is unable to walk. due to debility and swelling.   Pain is reasonably well-controlled.  She is eating okay.  She is hoping to start chemotherapy ASAP.  We discussed 2 to 3 weeks of physical therapy to get stronger.  Will start eribulin January 6.  She is on dexamethasone. Recommend to taper over 2 to 3 weeks [de-identified] : This is a very pleasant 61-year-old female diagnosed with metastatic triple negative breast cancer (metastatic disease to bones and lymph nodes) in July 2023, received Taxol Keytruda 7/202 3-1/2024, POD bones, Carboplatin Gemcitabine and Keytruda 2/202 4-5/2024, then she decided to take a treatment break.  She has been off treatment and reports worsening of palpable metastatic lymph nodes and worsening bone pain. Trodelvy initiated on 8/23/2024.  Palliative RT to the bone 9/2024 1/6/2025 Patient has completed radiation therapy last month.  She is currently in the rehab.  Still on dexamethasone.  Recommended to taper.  Here to start chemotherapy today.  Eribulin Risks benefits reviewed.  Informed consent obtained. Patient continues to have lower extremity swelling, DVT has been ruled out.  She is on Lasix with minimal improvement.  I recommend leg elevation, mobility as tolerated. She is on a stable pain regimen, followed by palliative Appetite good, weight stable Plan to  give 3-4 cycles of eribulin and repeat scans Xgeva to decrease the risk of skeletal metS, will add on day 1.  Every 6 weeks                                                                                                                                             Continue follow-up with palliative for pain control

## 2025-01-06 NOTE — ASSESSMENT
[FreeTextEntry1] : This is a very pleasant 61-year-old female diagnosed with metastatic triple negative breast cancer (metastatic disease to bones and lymph nodes) in July 2023, received Taxol Keytruda 7/202 3-1/2024, POD bones, Carboplatin Gemcitabine and Keytruda 2/202 4-5/2024, then she decided to take a treatment break.  She has been off treatment and reports worsening of palpable metastatic lymph nodes and worsening bone pain. Trodelvy initiated on 8/23/2024.     METASTATIC BREAST CA: Triple Negative (ER: Negative < 1%, NE: Negative < 1%, HER2: Negative, 1+ staining). Slide Review completed by [facility] on 8/14/2024. Original biopsy site: Right Axilla. Biopsy completed at [facility] on 7/12/2023.   1/6/2025 Patient has completed radiation therapy last month.  She is currently in the rehab.  Still on dexamethasone.  Recommended to taper.  Here to start chemotherapy today.  Eribulin Risks benefits reviewed.  Informed consent obtained. Patient continues to have lower extremity swelling, DVT has been ruled out.  She is on Lasix with minimal improvement.  I recommend leg elevation, mobility as tolerated. She is on a stable pain regimen, followed by palliative Appetite good, weight stable Plan to  give 3-4 cycles of eribulin and repeat scans Xgeva to decrease the risk of skeletal metS, will add on day 1.  Every 6 weeks                                                                                                                                             Continue follow-up with palliative for pain control  [id number] testing demonstrated no actionable mutations Plan to re-biopsy on progression.   MALIGNANT BONE PAIN:  PET baseline 8/2024 d/w her. She has several pathologic fractures. Ms. Singh has established care with Palliative Care on 18 September 2024. She is currently taking: MSER 30mg bid, hydromorphone 6mg 3-4 times per day and Tizanidine 1-2 times per day.  continue pall care f/u   CHEMOTHERAPY INDUCED NEUROPATHY: Mild. Continue Gabapentin, 300 mg TID.   GENETICS: Obtain BRCA test results to see if she is a candidate for Olaparib. BRCA TO BE SENT 1/13/25  FATIGUE:  2/2 MALIGNANCY and treatment: encourage increase activity as tolerated. Mild fatigue tolerable/stable, energy waxes/wanes.  - Chemotherapy induced anemia- Grade 3. Recommend PRBC transfusion to maintain Hb> 8.   - Risk for Chemotherapy induced diarrhea- Imodium PRN. Maintain PO hydration. BRAT diet - Risk for Chemotherapy induced N/V- Will get pre-medications with Emend, Dexamethasone and Aloxi. She will continue Dexamethasone for next 3 days for antinausea prophylaxis. She will use Reglan as needed.  - GF induced bone pain- Patient to take Claritin Days 2-7. - Chemotherapy induced dysgeusia, weight loss and fatigue: Encourage oral fluids, small frequent meals.  - Chemo induced neuropathy- continue to monitor. - Alopecia- prescription for wig given.  - Emotional support provided, all questions answered.  CHEMO AND BLOOD WORK ORDERED IN [facility] FOR TODAY APPTS REVIEWED AND SCHEDULED DURING THIS VISIT

## 2025-01-14 NOTE — ASSESSMENT
[FreeTextEntry1] : This is a very pleasant 61-year-old female diagnosed with metastatic triple negative breast cancer (metastatic disease to bones and lymph nodes) in July 2023, received Taxol Keytruda 7/202 3-1/2024, POD bones, Carboplatin Gemcitabine and Keytruda 2/202 4-5/2024, then she decided to take a treatment break.  She has been off treatment and reports worsening of palpable metastatic lymph nodes and worsening bone pain. Trodelvy initiated on 8/23/2024. Palliative RT to the bone 9/2024. Progression noted on PET Scan completed on 11/19/2024. Case discussed with Breast Tumor Board. Patient was to start Eribulin on 12/6/24; treatment delayed secondary to anemia and significant bone pain for which patient was admitted to hospital for symptom management/stabilization. Patient received additional palliative RT while hospitalized; treatment completed 12/13/2024. Patient released from the hospital and entered rehab in late December 2024. Eribulin started 1/6/25.   METASTATIC BREAST CA: Triple Negative (ER: Negative < 1%, ME: Negative < 1%, HER2: Negative, 1+ staining). Slide Review completed by [facility] on 8/14/2024. Original biopsy site: Right Axilla. Biopsy completed at [facility] on 7/12/2023.   1/13/2025: Ms. Singh presents today for Cycle 1, Day 8 of Eribulin + ONPRO. She is accompanied by a member of the healthcare team from the [facility].  Presently, the patient reports she is doing well today, but did go to the [facility] ER on 1/10/25 c/o diffuse abdominal pain, diarrhea, and nausea. Blood work, Chest X-Ray and CT scan of the Abdomen and Pelvis were obtained.  CHEST X-RAY IMPRESSION: Right-sided CT compatible port with tip in the SVC. Elevated right hemidiaphragm. The lungs are clear. Heart is not enlarged considering technique and no effusions or pneumothorax were noted.  CT scan of the ABDOMEN & PELVIS IMPRESSION: Grossly stable study compared to 12/8/2024. 1.  Stable moderate right and moderate left hydronephrosis secondary to mid to distal ureteral encasement/obliteration by retroperitoneal soft tissue density. 2.  Stable retroperitoneal and pelvic lymphadenopathy and osseous metastatic disease. 3.  New small bilateral pleural effusions and severe anasarca suggestive of fluid overload state/third spacing. The patient was not admitted and released that evening.   Ms. Singh reports that she no longer wishes to undergo treatment with XGEVA. She believes that the injection caused abdominal swelling and her recent trip to the hospital for diffuse abdominal pain, nausea and diarrhea. We discussed that XGEVA is used to prevent skeletal-related events (SREs) in patients with bone metastases. I reiterated that common side effects of XGEVA include: fatigue, mild nausea, back pain, and jaw problems (osteonecrosis of the jaw) and that the XGEVA was likely not the root cause of her symptoms. Despite reiteration of the benefits of XGEVA, the patient wishes to decline additional treatment with this medication going forward. Of note, she reports this medication was offered to her while living in [place], where she too declined treatment.   On PE exam today, the appearance of two new palpable cervical lymph nodes were noted (right neck).  BILATERAL LOWER EXTREMITY SWELLING: Patient continues to have bilateral lower extremity swelling, DVT has been ruled out. She is on Lasix with minimal improvement and wishes to discontinue medication.  I recommend leg elevation, mobility as tolerated. Office to discuss discontinuation of treatment with attending physician at [facility]hab Center.  [id number] testing demonstrated no actionable mutations Plan to re-biopsy on progression.   MALIGNANT BONE PAIN:  PET baseline 8/2024 d/w her. She has several pathologic fractures. Ms. Singh has established care with Palliative Care on 18 September 2024. She is currently taking: MSER 30mg bid, hydromorphone 6mg 3-4 times per day and Tizanidine 1-2 times per day.  Continue palliative care f/u; next visit scheduled for 30 JANUARY 2025.  CHEMOTHERAPY INDUCED NEUROPATHY: Mild. Continue Gabapentin, 300 mg TID.   GENETICS: [employer] [facility] Results obtained from company on 1/7/2025. Patient underwent testing in July 2023. 47 genes tested with no deleterious mutations or VUS's identified. Results scanned into the EMR.   FATIGUE:  2/2 MALIGNANCY and treatment: encourage increase activity as tolerated. Mild fatigue tolerable/stable, energy waxes/wanes.  - Chemotherapy induced anemia- Grade 3. Recommend PRBC transfusion to maintain Hb> 8.   - Risk for Chemotherapy induced diarrhea- Imodium PRN. Maintain PO hydration. BRAT diet - Risk for Chemotherapy induced N/V- Will get pre-medications with Dexamethasone and Aloxi.  She will use Reglan as needed.  - GF induced bone pain- Patient to take Claritin Days 2-7. - Chemotherapy induced dysgeusia, weight loss and fatigue: Encourage oral fluids, small frequent meals.  - Chemo induced neuropathy- continue to monitor. - Alopecia- prescription for wig given. Patient utilizing head scarves/hats.  - Emotional support provided, all questions answered.  CHEMO AND BLOOD WORK ORDERED IN [facility] FOR TODAY APPTS REVIEWED AND SCHEDULED DURING THIS VISIT

## 2025-01-14 NOTE — PHYSICAL EXAM
[Ambulatory and capable of all self care but unable to carry out any work activities] : Status 2- Ambulatory and capable of all self care but unable to carry out any work activities. Up and about more than 50% of waking hours [Normal] : affect appropriate [de-identified] : in wheelchair  [de-identified] : two new palpable cervical lymph nodes were noted (right neck)  [de-identified] : large right axillary and right cervical LN [de-identified] : bilateral edema prevalent in lower extremities [de-identified] : flat

## 2025-01-14 NOTE — HISTORY OF PRESENT ILLNESS
[Disease: _____________________] : Disease: [unfilled] [de-identified] :  This is a very pleasant 61-year-old female presented today for an evaluation for metastatic triple negative breast cancer.  Patient reports she noticed a large lump under her right arm and lymphedema in July 2023.  She went to the emergency room in [place] and workup showed metastatic triple negative breast cancer.  She underwent a right lymph node biopsy which was reportedly triple negative.  Biopsy report is not available for review.  She started palliative intent Taxol and pembrolizumab July 2023.  A PET/CT September 2023 showed mixed response, and she continued on the same regimen.  A PET/CT in January 2024 showed marked progression of disease in the bones.  Treatment was changed to carboplatin gemcitabine and pembrolizumab in February 2024.  She reportedly developed neutropenia and needed Neulasta for subsequent cycles.  PET/CT May 2024 showed stable disease.  Her last chemotherapy treatment was on 5/17/2024.  She decided to move to [place] close to family.  She has not had any treatment since then.  She reports she wanted to take a treatment break and has been taking holistic treatments.  She was disappointed that  neck and armpit masses grew during treatment break.  She was seen at [facility] last month and is referred here for continuation of care.  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  She is fasting 16 to 24 hours and has lost 40 pounds in the last 4 months.  She has significant right hip and left leg pain.  She is taking Dilaudid as needed and gabapentin.  Currently pain is 5 out of 10.  She walks with a cane and pain gets worse on walking after 1-2 blocks.  She had radiation to spine last year and developed significant toxicity and does not want to take radiation anymore. She is currently getting home PT. she reports skin discoloration from radiation and Neulasta.  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  Port has not been placed in 3 months.  She has double-lumen port.  She reports BRCA testing was done but she is not aware of results.  She lives with her parents and her daughter is supporting her.  8/23/2024 Patient presents for Trodelvy Cycle 1, Day 1. She is accompanied by her daughter. All questions asked/answered prior to initiation of treatment.  Patient underwent Port Study on 8/2124 at [facility].  Procedure Findings demonstrated a left-sided double port with tip of catheter in the SVC. The left port was accessed under sterile conditions with blood return and appropriate flushing was demonstrated. Hand injection of contrast demonstrates filling of port well and lumen without evidence of fibrin sheath. Unfortunately, multiple attempts were made with right port however it was unsuccessful. The plan per the Interventional Radiologist included: 1. Left port may be accessed and utilized for her upcoming treatment of chemotherapy. 2. Suggestion of possible replacement with a single lumen Mediport. Ms. Singh c/o of significant pain 10/10 on the pain scale. Reports she ran out of Hydromorphone and Gabapentin that was dispensed in the ER since last week. Ms. Singh reported that her pain was well controlled when taking Hydromorphone and Gabapentin as prescribed. Refills sent to [facility], and delivered at chairside. 4mg of Hydromorphone prescribed to be given at chairside prior to treatment. 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 Pathologic fractures in the right superior pubic ramus and left proximal fibula were noted on recent PET CT scan completed on 8/21/2024. Patient continues to take Eliquis daily (5 mg PO BID) for port r/t thrombus. Patient denies any s/s c/w easily bleeding or bruising.  Ms. Singh requested to speak with Nutrition today. Nutrition team was able to see the patient at the chairside. Appreciate note and recommendations. RTO: with each Cycle of Trodelvy, on Day 1. Appointments reviewed with patient and print out given.   8/28/2024 Patient presents today for repeat blood work and to complete [employer] Testing.  We again discussed the recent results of PTT/INR lab work that was significantly elevated on 8/23/2024. Patient was contacted on 8/26/2024 to initially discuss the elevated lab results. Ms. Singh reported that she was taking the Eliquis 5 mg PO BID as indicated. Upon questioning the patient further regarding the matter, she stated she ran out of the medication and needed a refill. Patient continues to deny as signs of bleeding or bruising. Results of PTT/INR today were WNL, and Eliquis 5mg PO BID was ordered to [facility].   Patient reported taking several supplements to include: Turmeric, Vitamin E, Black Seed Oil and several other non-FDA approved mixed/multi-vitamins up until Day 1 of treatment (8/23/2024). We discussed that these particular supplements may have unknown drug interactions with treatment and can also elevate both PTT & INR. The patient has since discontinued the supplements.   Discussed that [id number] testing is used to identify genomic alterations within the cancer's DNA that may make the patient eligible for a specific therapy/personalized treatment for her cancer. Patient verbalized consent and signed the necessary paperwork. Our office to convey results when they are made available.   Ms. Singh reports her pain is slightly improved since she started re-taking Dilaudid and Gabapentin as prescribed. Referral was made to Palliative Care, and an appointment was scheduled for 18 September 2024.   8/30/2024: day8 of Trodelvy,  She reports significant fatigue and altered taste x 3-4 days after chemo. She was able to function, maintained PO intake, weight stable. She had mild nausea, took Reglan, no vomiting, no diarrhea or mouth sores. No neuropathy, no fevers. Hb low, discussed PRBC tx PET baseline 8/2024 d/w her. She has several pathologic fractures. She doesnt want to go to ER, thinks they arent new. She has been hesitant about RT. Discussed importance of ortho onc consultation and pall RT for bone fractures and pain. No wt beaning until ortho consult.  She is on Eliquis 5 mg PO BID for port-related thrombus. PT/INR was significantly high, normalized on repeat labs  Has R shoulder, pelvis, LE pain somewhat controlled on Dilaudid every 4 hours, gabapentin Q3H. A. Pending palliative care appointment for pain management next month.  Does not have appetite but is taking small portions of meals. lso taking ensure Holding supplements 2 days before and after the chemo: Turmeric, Vitamin E, Black Seed Oil and several other non-FDA approved mixed/multi-vitamins up  [id number] testing sent during last visit Palliative Care, and an appointment was scheduled for 18 September 2024.   9/20/2024 Patient presents today for Cycle 2, Day 8 of Trodelvy.  She reports significant fatigue and altered taste x 3-4 days after chemo. She remains able to function, maintained PO intake, and her weight is stable. She continues to experience mild nausea, but it is improving, and no Reglan was needed after Cycle 2, Day 1. Denies fevers, vomiting, diarrhea, neuropathy or mouth sores.  Discussed low Hgb (8.1 g/dL), and possible need for PRBC transfusion. At this time, the patient denies being symptomatic and wishes to see if her bone marrow can recover on its own. She has been attending numerous appointments (Palliative, Orthopedics, RT) and is becoming overwhelmed.  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 Denies any SOB or palpitations. Ms. Singh has established care with Palliative Care on 18 September 2024 (follow-up scheduled in one week/Telemedicine Visit). Patient reports she enjoyed meeting this provider and was thankful for her recommendations to change the Hydromorphone from Q6 hours to Q4 hours. Additionally, she is now taking Tizanidine 2mg, 1 to 2 tablets every 8 hours for muscle spasms. She reports this medication has made a big difference and reports her overall pain is much more manageable/tolerable. Patient will also continue to take Gabapentin 300 mg TID. She is also now on a stricter bowel regimen taking Colace TID and Miralax daily. She is having BM's every other day.  Ms. Singh also met with Dr. Serena Bustos from Radiation Oncology on 9 September 2024. She reports being much more open to the idea of RT for pain relief and signed informed consent at the initial consultation. It was discussed at that visit a plan of care will be created once the patient established care with Dr. Lauren.  Ms. Singh established care with Dr. Subhash Lauren from Orthopedic Surgery. Patient was offered Right Hip Hemiarthroplasty and surgical stabilization of the Left femur. The patient declined surgical intervention and was instructed to return to the office in 1 month for repeat x-ray imaging.  Patient continues to hold supplements 2 days before and after the chemo: Turmeric, Vitamin E, Black Seed Oil and several other non-FDA approved mixed/multi-vitamins. RTO: with each Cycle of Trodelvy, on Day 8.  11/22/24 Patient had imaging done on 11/2024 . Upon review of PET/CT films, my interpretation is that patient has mixed response.  Some of the valentin disease and bone mets are improved but she has new and worsening bony lesions as well as fracture causing symptoms.  She has new lung metastases.  Imaging was reviewed in the tumor board and it was consensus to switch her treatment due to overall worsening disease. I reviewed these findings with the patient and daughter.  We will switch to eribulin She has worsening bilateral lower extremity swelling.  She had Doppler done recently to rule out DVT.  She is mostly sedentary due to pelvic fractures and pain.  She is taking pain medication for pain control.  Recommend Lasix for lower extremity swelling.  She is also referred to radiation oncology for radiation to painful bony left hip metastasis  She has malignant bone pain.  She is followed by palliative.  She is on Dilaudid and morphine.  She is able to shuffle slowly and get around.  Not able to bear weight. She had low hemoglobin which is stable now. she has met with orthopedic oncology and is refusing surgical fixation. 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 Denies any SOB or palpitations.  Port was replaced last week.  Lower extremity Doppler 11/2024 did not show evidence of DVT   12/6/2024: Patient presents for New Drug Eribulin, Day 1 Review of CBC revealed a Hgb of 7 g/dL. Discussed delay of treatment today given critical lab value/not meeting parameters to treat. PRBC infusion is necessary at this time. After much discussion surrounding the plan of care, it was suggested the patient be transferred to [facility] via Ambulance secondary to current physical state. The patient and daughter in agreement.  Patient reports she is no longer taking Eliquis OR Lasix daily, but rather every 2-3 days. The patient reports she is now taking Turmeric again because she prefers to be more homeopathic. Additionally, last prescription refill of Eliquis 5 mg refilled on 9/24/24 (30-day supply). Discussed importance of compliance with medication given history of port-related thrombus and hypercoagulable state given chemotherapy.  Ms. Singh reports she is not taking the Lasix as directed; believes it only helped a touch and wishes to seek referral to [facility]. Swelling in bilateral lower extremities prevalent on PE. Lower extremity Doppler completed on 11/1/2024 did not show evidence of DVT.  Ms. Singh also reports some difficulty swallowing with mild edema being appreciated on the right side of her neck. Patient reports she almost contacted 911 this week secondary.  Ms. Singh is scheduled to undergo Palliative RT from the 19th through the 25th. Patient reports she is anxious about waiting until then given the level of pain she is experiencing secondary to bony metastases. Patient reports she has developed worsening LBP that radiates toward her right lower abdomen/pelvis. Additionally, she reports pain the now affects her left foot, specifically the dorsal aspect. She remains unable to walk, utilizing a wheelchair and rolling walker at home. Utilizing an ambulette to get to and from appointments.  Patient is UTD with Palliative Care. She is currently taking: MSER 30mg bid, hydromorphone 6mg 3-4 times per day and Tizanidine 1-2 times per day. Pain scale at today's appointment: 8/10.  RTC: Once released from [facility]. Will work with in-patient Oncology and schedule in office follow-up as appropriate.   12/2024 Patient was admitted at [facility] 2 weeks ago with severe bone pain. She had repeat scans in the hospital which showed known osseous metastatic disease and impacted pathologic right femoral fracture.  Ortho oncology saw the patient but patient declined surgical intervention.  She received radiation therapy completed 12/13/2024. She also had severe bilateral lower extremity swelling. Workup showed no DVT, preserved EF.  Lower extremity  swelling is felt to be secondary to lymphedema.  She is currently at a rehab. She is unable to walk. due to debility and swelling.   Pain is reasonably well-controlled.  She is eating okay.  She is hoping to start chemotherapy ASAP.  We discussed 2 to 3 weeks of physical therapy to get stronger.  Will start eribulin January 6.  She is on dexamethasone. Recommend to taper over 2 to 3 weeks  1/6/2025 Patient has completed radiation therapy last month.  She is currently in the rehab.  Still on dexamethasone.  Recommended to taper.  Here to start chemotherapy today.  Eribulin Risks benefits reviewed.  Informed consent obtained. Patient continues to have lower extremity swelling, DVT has been ruled out.  She is on Lasix with minimal improvement.  I recommend leg elevation, mobility as tolerated. She is on a stable pain regimen, followed by palliative Appetite good, weight stable Plan to  give 3-4 cycles of eribulin and repeat scans Xgeva to decrease the risk of skeletal metS, will add on day 1.  Every 6 weeks                                                                                                                                             Continue follow-up with palliative for pain control [de-identified] : This is a very pleasant 61-year-old female diagnosed with metastatic triple negative breast cancer (metastatic disease to bones and lymph nodes) in July 2023, received Taxol Keytruda 7/202 3-1/2024, POD bones, Carboplatin Gemcitabine and Keytruda 2/2024 4-5/2024, then she decided to take a treatment break.  She has been off treatment and reports worsening of palpable metastatic lymph nodes and worsening bone pain. Trodelvy initiated on 8/23/2024.  Palliative RT to the bone 9/2024. Progression noted on PET Scan completed on 11/19/2024. Case discussed with Breast Tumor Board. Patient was to start Eribulin on 12/6/24; treatment delayed secondary to anemia and significant bone pain for which patient was admitted to hospital for symptom management/stabilization. Patient received additional palliative RT while hospitalized; treatment completed 12/13/2024. Patient released from the hospital and entered rehab in late December 2024. Eribulin started 1/6/25.   1/13/2025: Ms. Singh presents today for Cycle 1, Day 8 of Eribulin + ONPRO. She is accompanied by a member of the healthcare team [facility]. Presently, the patient reports she is doing well today, but did go to the [facility] ER on 1/10/25 c/o diffuse abdominal pain, diarrhea, and nausea. Blood work, Chest X-Ray and CT scan of the Abdomen and Pelvis were obtained.  CHEST X-RAY IMPRESSION: Right-sided CT compatible port with tip in the SVC. Elevated right hemidiaphragm. The lungs are clear. Heart is not enlarged considering technique and no effusions or pneumothorax were noted.  CT scan of the ABDOMEN & PELVIS IMPRESSION: Grossly stable study compared to 12/8/2024. 1.  Stable moderate right and moderate left hydronephrosis secondary to mid to distal ureteral encasement/obliteration by retroperitoneal soft tissue density. 2.  Stable retroperitoneal and pelvic lymphadenopathy and osseous metastatic disease. 3.  New small bilateral pleural effusions and severe anasarca suggestive of fluid overload state/third spacing. The patient was not admitted and released that evening.  Ms. Singh reports that she no longer wishes to undergo treatment with XGEVA. She believes that the injection caused abdominal swelling and her recent trip to the hospital for diffuse abdominal pain, nausea and diarrhea. We discussed that XGEVA is used to prevent skeletal-related events (SREs) in patients with bone metastases. I reiterated that common side effects of XGEVA include: fatigue, mild nausea, back pain, and jaw problems (osteonecrosis of the jaw) and that the XGEVA was likely not the root cause of her symptoms. Despite reiteration of the benefits of XGEVA, the patient wishes to decline additional treatment with this medication going forward. Of note, she reports this medication was offered to her while living in [place], where she too declined treatment.  Ms. Singh reports Dr. Lind and her team have been instrumental in managing her pain. She is scheduled to participate in a TEB on 30 January 2024. On PE exam today, the appearance of two new palpable cervical lymph nodes were noted (right neck). Patient continues to have bilateral lower extremity swelling, DVT has been ruled out. She is on Lasix with minimal improvement and wishes to discontinue medication.  I recommend leg elevation, mobility as tolerated. Office to discuss discontinuation of treatment with attending physician at [facility]. RTO: With Each Cycle of Eribulin; appointments made through mid-March 2025

## 2025-01-14 NOTE — REASON FOR VISIT
[Follow-Up Visit] : a follow-up [Formal Caregiver] : formal caregiver [FreeTextEntry2] : metastatic breast ca

## 2025-01-14 NOTE — HISTORY OF PRESENT ILLNESS
[Disease: _____________________] : Disease: [unfilled] [de-identified] :  This is a very pleasant 61-year-old female presented today for an evaluation for metastatic triple negative breast cancer.  Patient reports she noticed a large lump under her right arm and lymphedema in July 2023.  She went to the emergency room in [place] and workup showed metastatic triple negative breast cancer.  She underwent a right lymph node biopsy which was reportedly triple negative.  Biopsy report is not available for review.  She started palliative intent Taxol and pembrolizumab July 2023.  A PET/CT September 2023 showed mixed response, and she continued on the same regimen.  A PET/CT in January 2024 showed marked progression of disease in the bones.  Treatment was changed to carboplatin gemcitabine and pembrolizumab in February 2024.  She reportedly developed neutropenia and needed Neulasta for subsequent cycles.  PET/CT May 2024 showed stable disease.  Her last chemotherapy treatment was on 5/17/2024.  She decided to move to [place] close to family.  She has not had any treatment since then.  She reports she wanted to take a treatment break and has been taking holistic treatments.  She was disappointed that  neck and armpit masses grew during treatment break.  She was seen at [facility] last month and is referred here for continuation of care.  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  She is fasting 16 to 24 hours and has lost 40 pounds in the last 4 months.  She has significant right hip and left leg pain.  She is taking Dilaudid as needed and gabapentin.  Currently pain is 5 out of 10.  She walks with a cane and pain gets worse on walking after 1-2 blocks.  She had radiation to spine last year and developed significant toxicity and does not want to take radiation anymore. She is currently getting home PT. she reports skin discoloration from radiation and Neulasta.  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  Port has not been placed in 3 months.  She has double-lumen port.  She reports BRCA testing was done but she is not aware of results.  She lives with her parents and her daughter is supporting her.  8/23/2024 Patient presents for Trodelvy Cycle 1, Day 1. She is accompanied by her daughter. All questions asked/answered prior to initiation of treatment.  Patient underwent Port Study on 8/2124 at [facility].  Procedure Findings demonstrated a left-sided double port with tip of catheter in the SVC. The left port was accessed under sterile conditions with blood return and appropriate flushing was demonstrated. Hand injection of contrast demonstrates filling of port well and lumen without evidence of fibrin sheath. Unfortunately, multiple attempts were made with right port however it was unsuccessful. The plan per the Interventional Radiologist included: 1. Left port may be accessed and utilized for her upcoming treatment of chemotherapy. 2. Suggestion of possible replacement with a single lumen Mediport. Ms. Singh c/o of significant pain 10/10 on the pain scale. Reports she ran out of Hydromorphone and Gabapentin that was dispensed in the ER since last week. Ms. Singh reported that her pain was well controlled when taking Hydromorphone and Gabapentin as prescribed. Refills sent to [facility], and delivered at chairside. 4mg of Hydromorphone prescribed to be given at chairside prior to treatment. 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 Pathologic fractures in the right superior pubic ramus and left proximal fibula were noted on recent PET CT scan completed on 8/21/2024. Patient continues to take Eliquis daily (5 mg PO BID) for port r/t thrombus. Patient denies any s/s c/w easily bleeding or bruising.  Ms. Singh requested to speak with Nutrition today. Nutrition team was able to see the patient at the chairside. Appreciate note and recommendations. RTO: with each Cycle of Trodelvy, on Day 1. Appointments reviewed with patient and print out given.   8/28/2024 Patient presents today for repeat blood work and to complete [employer] Testing.  We again discussed the recent results of PTT/INR lab work that was significantly elevated on 8/23/2024. Patient was contacted on 8/26/2024 to initially discuss the elevated lab results. Ms. Singh reported that she was taking the Eliquis 5 mg PO BID as indicated. Upon questioning the patient further regarding the matter, she stated she ran out of the medication and needed a refill. Patient continues to deny as signs of bleeding or bruising. Results of PTT/INR today were WNL, and Eliquis 5mg PO BID was ordered to [facility].   Patient reported taking several supplements to include: Turmeric, Vitamin E, Black Seed Oil and several other non-FDA approved mixed/multi-vitamins up until Day 1 of treatment (8/23/2024). We discussed that these particular supplements may have unknown drug interactions with treatment and can also elevate both PTT & INR. The patient has since discontinued the supplements.   Discussed that [id number] testing is used to identify genomic alterations within the cancer's DNA that may make the patient eligible for a specific therapy/personalized treatment for her cancer. Patient verbalized consent and signed the necessary paperwork. Our office to convey results when they are made available.   Ms. Singh reports her pain is slightly improved since she started re-taking Dilaudid and Gabapentin as prescribed. Referral was made to Palliative Care, and an appointment was scheduled for 18 September 2024.   8/30/2024: day8 of Trodelvy,  She reports significant fatigue and altered taste x 3-4 days after chemo. She was able to function, maintained PO intake, weight stable. She had mild nausea, took Reglan, no vomiting, no diarrhea or mouth sores. No neuropathy, no fevers. Hb low, discussed PRBC tx PET baseline 8/2024 d/w her. She has several pathologic fractures. She doesnt want to go to ER, thinks they arent new. She has been hesitant about RT. Discussed importance of ortho onc consultation and pall RT for bone fractures and pain. No wt beaning until ortho consult.  She is on Eliquis 5 mg PO BID for port-related thrombus. PT/INR was significantly high, normalized on repeat labs  Has R shoulder, pelvis, LE pain somewhat controlled on Dilaudid every 4 hours, gabapentin Q3H. A. Pending palliative care appointment for pain management next month.  Does not have appetite but is taking small portions of meals. lso taking ensure Holding supplements 2 days before and after the chemo: Turmeric, Vitamin E, Black Seed Oil and several other non-FDA approved mixed/multi-vitamins up  [id number] testing sent during last visit Palliative Care, and an appointment was scheduled for 18 September 2024.   9/20/2024 Patient presents today for Cycle 2, Day 8 of Trodelvy.  She reports significant fatigue and altered taste x 3-4 days after chemo. She remains able to function, maintained PO intake, and her weight is stable. She continues to experience mild nausea, but it is improving, and no Reglan was needed after Cycle 2, Day 1. Denies fevers, vomiting, diarrhea, neuropathy or mouth sores.  Discussed low Hgb (8.1 g/dL), and possible need for PRBC transfusion. At this time, the patient denies being symptomatic and wishes to see if her bone marrow can recover on its own. She has been attending numerous appointments (Palliative, Orthopedics, RT) and is becoming overwhelmed.  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 Denies any SOB or palpitations. Ms. Singh has established care with Palliative Care on 18 September 2024 (follow-up scheduled in one week/Telemedicine Visit). Patient reports she enjoyed meeting this provider and was thankful for her recommendations to change the Hydromorphone from Q6 hours to Q4 hours. Additionally, she is now taking Tizanidine 2mg, 1 to 2 tablets every 8 hours for muscle spasms. She reports this medication has made a big difference and reports her overall pain is much more manageable/tolerable. Patient will also continue to take Gabapentin 300 mg TID. She is also now on a stricter bowel regimen taking Colace TID and Miralax daily. She is having BM's every other day.  Ms. Singh also met with Dr. Serena Bustos from Radiation Oncology on 9 September 2024. She reports being much more open to the idea of RT for pain relief and signed informed consent at the initial consultation. It was discussed at that visit a plan of care will be created once the patient established care with Dr. Lauren.  Ms. Singh established care with Dr. Subhash Lauren from Orthopedic Surgery. Patient was offered Right Hip Hemiarthroplasty and surgical stabilization of the Left femur. The patient declined surgical intervention and was instructed to return to the office in 1 month for repeat x-ray imaging.  Patient continues to hold supplements 2 days before and after the chemo: Turmeric, Vitamin E, Black Seed Oil and several other non-FDA approved mixed/multi-vitamins. RTO: with each Cycle of Trodelvy, on Day 8.  11/22/24 Patient had imaging done on 11/2024 . Upon review of PET/CT films, my interpretation is that patient has mixed response.  Some of the valentin disease and bone mets are improved but she has new and worsening bony lesions as well as fracture causing symptoms.  She has new lung metastases.  Imaging was reviewed in the tumor board and it was consensus to switch her treatment due to overall worsening disease. I reviewed these findings with the patient and daughter.  We will switch to eribulin She has worsening bilateral lower extremity swelling.  She had Doppler done recently to rule out DVT.  She is mostly sedentary due to pelvic fractures and pain.  She is taking pain medication for pain control.  Recommend Lasix for lower extremity swelling.  She is also referred to radiation oncology for radiation to painful bony left hip metastasis  She has malignant bone pain.  She is followed by palliative.  She is on Dilaudid and morphine.  She is able to shuffle slowly and get around.  Not able to bear weight. She had low hemoglobin which is stable now. she has met with orthopedic oncology and is refusing surgical fixation. 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 Denies any SOB or palpitations.  Port was replaced last week.  Lower extremity Doppler 11/2024 did not show evidence of DVT   12/6/2024: Patient presents for New Drug Eribulin, Day 1 Review of CBC revealed a Hgb of 7 g/dL. Discussed delay of treatment today given critical lab value/not meeting parameters to treat. PRBC infusion is necessary at this time. After much discussion surrounding the plan of care, it was suggested the patient be transferred to [facility] via Ambulance secondary to current physical state. The patient and daughter in agreement.  Patient reports she is no longer taking Eliquis OR Lasix daily, but rather every 2-3 days. The patient reports she is now taking Turmeric again because she prefers to be more homeopathic. Additionally, last prescription refill of Eliquis 5 mg refilled on 9/24/24 (30-day supply). Discussed importance of compliance with medication given history of port-related thrombus and hypercoagulable state given chemotherapy.  Ms. Singh reports she is not taking the Lasix as directed; believes it only helped a touch and wishes to seek referral to \[facility]\"" Lymphedema program. Swelling in bilateral lower extremities prevalent on PE. Lower extremity Doppler completed on 11/1/2024 did not show evidence of DVT.  Ms. Singh also reports some difficulty swallowing with mild edema being appreciated on the right side of her neck. Patient reports she almost contacted 911 this week secondary.  Ms. Singh is scheduled to undergo Palliative RT from the 19th through the 25th. Patient reports she is anxious about waiting until then given the level of pain she is experiencing secondary to bony metastases. Patient reports she has developed worsening LBP that radiates toward her right lower abdomen/pelvis. Additionally, she reports pain the now affects her left foot, specifically the dorsal aspect. She remains unable to walk, utilizing a wheelchair and rolling walker at home. Utilizing an ambulette to get to and from appointments.  Patient is UTD with Palliative Care. She is currently taking: MSER 30mg bid, hydromorphone 6mg 3-4 times per day and Tizanidine 1-2 times per day. Pain scale at today's appointment: 8/10.  RTC: Once released from [facility]. Will work with in-patient Oncology and schedule in office follow-up as appropriate.   12/2024 Patient was admitted at [facility] 2 weeks ago with severe bone pain. She had repeat scans in the hospital which showed known osseous metastatic disease and impacted pathologic right femoral fracture.  Ortho oncology saw the patient but patient declined surgical intervention.  She received radiation therapy completed 12/13/2024. She also had severe bilateral lower extremity swelling. Workup showed no DVT, preserved EF.  Lower extremity  swelling is felt to be secondary to lymphedema.  She is currently at a rehab. She is unable to walk. due to debility and swelling.   Pain is reasonably well-controlled.  She is eating okay.  She is hoping to start chemotherapy ASAP.  We discussed 2 to 3 weeks of physical therapy to get stronger.  Will start eribulin January 6.  She is on dexamethasone. Recommend to taper over 2 to 3 weeks  1/6/2025 Patient has completed radiation therapy last month.  She is currently in the rehab.  Still on dexamethasone.  Recommended to taper.  Here to start chemotherapy today.  Eribulin Risks benefits reviewed.  Informed consent obtained. Patient continues to have lower extremity swelling, DVT has been ruled out.  She is on Lasix with minimal improvement.  I recommend leg elevation, mobility as tolerated. She is on a stable pain regimen, followed by palliative Appetite good, weight stable Plan to  give 3-4 cycles of eribulin and repeat scans Xgeva to decrease the risk of skeletal metS, will add on day 1.  Every 6 weeks                                                                                                                                             Continue follow-up with palliative for pain control [de-identified] : This is a very pleasant 61-year-old female diagnosed with metastatic triple negative breast cancer (metastatic disease to bones and lymph nodes) in July 2023, received Taxol Keytruda 7/202 3-1/2024, POD bones, Carboplatin Gemcitabine and Keytruda 2/2024 4-5/2024, then she decided to take a treatment break.  She has been off treatment and reports worsening of palpable metastatic lymph nodes and worsening bone pain. Trodelvy initiated on 8/23/2024.  Palliative RT to the bone 9/2024. Progression noted on PET Scan completed on 11/19/2024. Case discussed with Breast Tumor Board. Patient was to start Eribulin on 12/6/24; treatment delayed secondary to anemia and significant bone pain for which patient was admitted to hospital for symptom management/stabilization. Patient received additional palliative RT while hospitalized; treatment completed 12/13/2024. Patient released from the hospital and entered rehab in late December 2024. Eribulin started 1/6/25.   1/13/2025: Ms. Singh presents today for Cycle 1, Day 8 of Eribulin + ONPRO. She is accompanied by a member of the healthcare team from the [facility]. Presently, the patient reports she is doing well today, but did go to the [facility] ER on 1/10/25 c/o diffuse abdominal pain, diarrhea, and nausea. Blood work, Chest X-Ray and CT scan of the Abdomen and Pelvis were obtained.  CHEST X-RAY IMPRESSION: Right-sided CT compatible port with tip in the SVC. Elevated right hemidiaphragm. The lungs are clear. Heart is not enlarged considering technique and no effusions or pneumothorax were noted.  CT scan of the ABDOMEN & PELVIS IMPRESSION: Grossly stable study compared to 12/8/2024. 1.  Stable moderate right and moderate left hydronephrosis secondary to mid to distal ureteral encasement/obliteration by retroperitoneal soft tissue density. 2.  Stable retroperitoneal and pelvic lymphadenopathy and osseous metastatic disease. 3.  New small bilateral pleural effusions and severe anasarca suggestive of fluid overload state/third spacing. The patient was not admitted and released that evening.  Ms. Singh reports that she no longer wishes to undergo treatment with XGEVA. She believes that the injection caused abdominal swelling and her recent trip to the hospital for diffuse abdominal pain, nausea and diarrhea. We discussed that XGEVA is used to prevent skeletal-related events (SREs) in patients with bone metastases. I reiterated that common side effects of XGEVA include: fatigue, mild nausea, back pain, and jaw problems (osteonecrosis of the jaw) and that the XGEVA was likely not the root cause of her symptoms. Despite reiteration of the benefits of XGEVA, the patient wishes to decline additional treatment with this medication going forward. Of note, she reports this medication was offered to her while living in [place], where she too declined treatment.  Ms. Singh reports Dr. Lind and her team have been instrumental in managing her pain. She is scheduled to participate in a TEB on 30 January 2024. On PE exam today, the appearance of two new palpable cervical lymph nodes were noted (right neck). Patient continues to have bilateral lower extremity swelling, DVT has been ruled out. She is on Lasix with minimal improvement and wishes to discontinue medication.  I recommend leg elevation, mobility as tolerated. Office to discuss discontinuation of treatment with attending physician at [facility]. RTO: With Each Cycle of Eribulin; appointments made through mid-March 2025

## 2025-01-14 NOTE — ASSESSMENT
[FreeTextEntry1] : This is a very pleasant 61-year-old female diagnosed with metastatic triple negative breast cancer (metastatic disease to bones and lymph nodes) in July 2023, received Taxol Keytruda 7/202 3-1/2024, POD bones, Carboplatin Gemcitabine and Keytruda 2/202 4-5/2024, then she decided to take a treatment break.  She has been off treatment and reports worsening of palpable metastatic lymph nodes and worsening bone pain. Trodelvy initiated on 8/23/2024. Palliative RT to the bone 9/2024. Progression noted on PET Scan completed on 11/19/2024. Case discussed with Breast Tumor Board. Patient was to start Eribulin on 12/6/24; treatment delayed secondary to anemia and significant bone pain for which patient was admitted to hospital for symptom management/stabilization. Patient received additional palliative RT while hospitalized; treatment completed 12/13/2024. Patient released from the hospital and entered rehab in late December 2024. Eribulin started 1/6/25.   METASTATIC BREAST CA: Triple Negative (ER: Negative < 1%, [place]: Negative < 1%, HER2: Negative, 1+ staining). Slide Review completed by [employer] on 8/14/2024. Original biopsy site: Right Axilla. Biopsy completed at [facility] on 7/12/2023.   1/13/2025: Ms. Singh presents today for Cycle 1, Day 8 of Eribulin + ONPRO. She is accompanied by a member of the healthcare team from the [facility].  Presently, the patient reports she is doing well today, but did go to the [facility] ER on 1/10/25 c/o diffuse abdominal pain, diarrhea, and nausea. Blood work, Chest X-Ray and CT scan of the Abdomen and Pelvis were obtained.  CHEST X-RAY IMPRESSION: Right-sided CT compatible port with tip in the SVC. Elevated right hemidiaphragm. The lungs are clear. Heart is not enlarged considering technique and no effusions or pneumothorax were noted.  CT scan of the ABDOMEN & PELVIS IMPRESSION: Grossly stable study compared to 12/8/2024. 1.  Stable moderate right and moderate left hydronephrosis secondary to mid to distal ureteral encasement/obliteration by retroperitoneal soft tissue density. 2.  Stable retroperitoneal and pelvic lymphadenopathy and osseous metastatic disease. 3.  New small bilateral pleural effusions and severe anasarca suggestive of fluid overload state/third spacing. The patient was not admitted and released that evening.   Ms. Singh reports that she no longer wishes to undergo treatment with XGEVA. She believes that the injection caused abdominal swelling and her recent trip to the hospital for diffuse abdominal pain, nausea and diarrhea. We discussed that XGEVA is used to prevent skeletal-related events (SREs) in patients with bone metastases. I reiterated that common side effects of XGEVA include: fatigue, mild nausea, back pain, and jaw problems (osteonecrosis of the jaw) and that the XGEVA was likely not the root cause of her symptoms. Despite reiteration of the benefits of XGEVA, the patient wishes to decline additional treatment with this medication going forward. Of note, she reports this medication was offered to her while living in [place], where she too declined treatment.   On PE exam today, the appearance of two new palpable cervical lymph nodes were noted (right neck).  BILATERAL LOWER EXTREMITY SWELLING: Patient continues to have bilateral lower extremity swelling, DVT has been ruled out. She is on Lasix with minimal improvement and wishes to discontinue medication.  I recommend leg elevation, mobility as tolerated. Office to discuss discontinuation of treatment with attending physician at [facility].  [id number] testing demonstrated no actionable mutations Plan to re-biopsy on progression.   MALIGNANT BONE PAIN:  PET baseline 8/2024 d/w her. She has several pathologic fractures. Ms. Singh has established care with Palliative Care on 18 September 2024. She is currently taking: MSER 30mg bid, hydromorphone 6mg 3-4 times per day and Tizanidine 1-2 times per day.  Continue palliative care f/u; next visit scheduled for 30 JANUARY 2025.  CHEMOTHERAPY INDUCED NEUROPATHY: Mild. Continue Gabapentin, 300 mg TID.   GENETICS: [employer] [facility] Results obtained from company on 1/7/2025. Patient underwent testing in July 2023. 47 genes tested with no deleterious mutations or VUS's identified. Results scanned into the EMR.   FATIGUE:  2/2 MALIGNANCY and treatment: encourage increase activity as tolerated. Mild fatigue tolerable/stable, energy waxes/wanes.  - Chemotherapy induced anemia- Grade 3. Recommend PRBC transfusion to maintain Hb> 8.   - Risk for Chemotherapy induced diarrhea- Imodium PRN. Maintain PO hydration. BRAT diet - Risk for Chemotherapy induced N/V- Will get pre-medications with Dexamethasone and Aloxi.  She will use Reglan as needed.  - GF induced bone pain- Patient to take Claritin Days 2-7. - Chemotherapy induced dysgeusia, weight loss and fatigue: Encourage oral fluids, small frequent meals.  - Chemo induced neuropathy- continue to monitor. - Alopecia- prescription for wig given. Patient utilizing head scarves/hats.  - Emotional support provided, all questions answered.  CHEMO AND BLOOD WORK ORDERED IN [place] FOR TODAY APPTS REVIEWED AND SCHEDULED DURING THIS VISIT

## 2025-01-14 NOTE — PHYSICAL EXAM
[Ambulatory and capable of all self care but unable to carry out any work activities] : Status 2- Ambulatory and capable of all self care but unable to carry out any work activities. Up and about more than 50% of waking hours [Normal] : affect appropriate [de-identified] : in wheelchair  [de-identified] : two new palpable cervical lymph nodes were noted (right neck)  [de-identified] : large right axillary and right cervical LN [de-identified] : bilateral edema prevalent in lower extremities [de-identified] : flat

## 2025-01-27 NOTE — HISTORY OF PRESENT ILLNESS
[Disease: _____________________] : Disease: [unfilled] [de-identified] :  This is a very pleasant 61-year-old female presented today for an evaluation for metastatic triple negative breast cancer.  Patient reports she noticed a large lump under her right arm and lymphedema in July 2023.  She went to the emergency room in [place] and workup showed metastatic triple negative breast cancer.  She underwent a right lymph node biopsy which was reportedly triple negative.  Biopsy report is not available for review.  She started palliative intent Taxol and pembrolizumab July 2023.  A PET/CT September 2023 showed mixed response, and she continued on the same regimen.  A PET/CT in January 2024 showed marked progression of disease in the bones.  Treatment was changed to carboplatin gemcitabine and pembrolizumab in February 2024.  She reportedly developed neutropenia and needed Neulasta for subsequent cycles.  PET/CT May 2024 showed stable disease.  Her last chemotherapy treatment was on 5/17/2024.  She decided to move to [place] close to family.  She has not had any treatment since then.  She reports she wanted to take a treatment break and has been taking holistic treatments.  She was disappointed that  neck and armpit masses grew during treatment break.  She was seen at [facility] last month and is referred here for continuation of care.  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  She is fasting 16 to 24 hours and has lost 40 pounds in the last 4 months.  She has significant right hip and left leg pain.  She is taking Dilaudid as needed and gabapentin.  Currently pain is 5 out of 10.  She walks with a cane and pain gets worse on walking after 1-2 blocks.  She had radiation to spine last year and developed significant toxicity and does not want to take radiation anymore. She is currently getting home PT. she reports skin discoloration from radiation and Neulasta.  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  Port has not been placed in 3 months.  She has double-lumen port.  She reports BRCA testing was done but she is not aware of results.  She lives with her parents and her daughter is supporting her.  8/23/2024 Patient presents for Trodelvy Cycle 1, Day 1. She is accompanied by her daughter. All questions asked/answered prior to initiation of treatment.  Patient underwent Port Study on 8/2124 at [facility].  Procedure Findings demonstrated a left-sided double port with tip of catheter in the SVC. The left port was accessed under sterile conditions with blood return and appropriate flushing was demonstrated. Hand injection of contrast demonstrates filling of port well and lumen without evidence of fibrin sheath. Unfortunately, multiple attempts were made with right port however it was unsuccessful. The plan per the Interventional Radiologist included: 1. Left port may be accessed and utilized for her upcoming treatment of chemotherapy. 2. Suggestion of possible replacement with a single lumen Mediport. Ms. Singh c/o of significant pain 10/10 on the pain scale. Reports she ran out of Hydromorphone and Gabapentin that was dispensed in the ER since last week. Ms. Singh reported that her pain was well controlled when taking Hydromorphone and Gabapentin as prescribed. Refills sent to [facility], and delivered at chairside. 4mg of Hydromorphone prescribed to be given at chairside prior to treatment. 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 Pathologic fractures in the right superior pubic ramus and left proximal fibula were noted on recent PET CT scan completed on 8/21/2024. Patient continues to take Eliquis daily (5 mg PO BID) for port r/t thrombus. Patient denies any s/s c/w easily bleeding or bruising.  Ms. Singh requested to speak with Nutrition today. Nutrition team was able to see the patient at the chairside. Appreciate note and recommendations. RTO: with each Cycle of Trodelvy, on Day 1. Appointments reviewed with patient and print out given.   8/28/2024 Patient presents today for repeat blood work and to complete [employer] Testing.  We again discussed the recent results of PTT/INR lab work that was significantly elevated on 8/23/2024. Patient was contacted on 8/26/2024 to initially discuss the elevated lab results. Ms. Singh reported that she was taking the Eliquis 5 mg PO BID as indicated. Upon questioning the patient further regarding the matter, she stated she ran out of the medication and needed a refill. Patient continues to deny as signs of bleeding or bruising. Results of PTT/INR today were WNL, and Eliquis 5mg PO BID was ordered to [facility].   Patient reported taking several supplements to include: Turmeric, Vitamin E, Black Seed Oil and several other non-FDA approved mixed/multi-vitamins up until Day 1 of treatment (8/23/2024). We discussed that these particular supplements may have unknown drug interactions with treatment and can also elevate both PTT & INR. The patient has since discontinued the supplements.   Discussed that [id number] testing is used to identify genomic alterations within the cancer's DNA that may make the patient eligible for a specific therapy/personalized treatment for her cancer. Patient verbalized consent and signed the necessary paperwork. Our office to convey results when they are made available.   Ms. Singh reports her pain is slightly improved since she started re-taking Dilaudid and Gabapentin as prescribed. Referral was made to Palliative Care, and an appointment was scheduled for 18 September 2024.   8/30/2024: day8 of Trodelvy,  She reports significant fatigue and altered taste x 3-4 days after chemo. She was able to function, maintained PO intake, weight stable. She had mild nausea, took Reglan, no vomiting, no diarrhea or mouth sores. No neuropathy, no fevers. Hb low, discussed PRBC tx PET baseline 8/2024 d/w her. She has several pathologic fractures. She doesnt want to go to ER, thinks they arent new. She has been hesitant about RT. Discussed importance of ortho onc consultation and pall RT for bone fractures and pain. No wt beaning until ortho consult.  She is on Eliquis 5 mg PO BID for port-related thrombus. PT/INR was significantly high, normalized on repeat labs  Has R shoulder, pelvis, LE pain somewhat controlled on Dilaudid every 4 hours, gabapentin Q3H. A. Pending palliative care appointment for pain management next month.  Does not have appetite but is taking small portions of meals. lso taking ensure Holding supplements 2 days before and after the chemo: Turmeric, Vitamin E, Black Seed Oil and several other non-FDA approved mixed/multi-vitamins up  [id number] testing sent during last visit Palliative Care, and an appointment was scheduled for 18 September 2024.   9/20/2024 Patient presents today for Cycle 2, Day 8 of Trodelvy.  She reports significant fatigue and altered taste x 3-4 days after chemo. She remains able to function, maintained PO intake, and her weight is stable. She continues to experience mild nausea, but it is improving, and no Reglan was needed after Cycle 2, Day 1. Denies fevers, vomiting, diarrhea, neuropathy or mouth sores.  Discussed low Hgb (8.1 g/dL), and possible need for PRBC transfusion. At this time, the patient denies being symptomatic and wishes to see if her bone marrow can recover on its own. She has been attending numerous appointments (Palliative, Orthopedics, RT) and is becoming overwhelmed.  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 Denies any SOB or palpitations. Ms. Singh has established care with Palliative Care on 18 September 2024 (follow-up scheduled in one week/Telemedicine Visit). Patient reports she enjoyed meeting this provider and was thankful for her recommendations to change the Hydromorphone from Q6 hours to Q4 hours. Additionally, she is now taking Tizanidine 2mg, 1 to 2 tablets every 8 hours for muscle spasms. She reports this medication has made a big difference and reports her overall pain is much more manageable/tolerable. Patient will also continue to take Gabapentin 300 mg TID. She is also now on a stricter bowel regimen taking Colace TID and Miralax daily. She is having BM's every other day.  Ms. Singh also met with Dr. Serena Bustos from Radiation Oncology on 9 September 2024. She reports being much more open to the idea of RT for pain relief and signed informed consent at the initial consultation. It was discussed at that visit a plan of care will be created once the patient established care with Dr. Lauren.  Ms. Singh established care with Dr. Subhash Lauren from Orthopedic Surgery. Patient was offered Right Hip Hemiarthroplasty and surgical stabilization of the Left femur. The patient declined surgical intervention and was instructed to return to the office in 1 month for repeat x-ray imaging.  Patient continues to hold supplements 2 days before and after the chemo: Turmeric, Vitamin E, Black Seed Oil and several other non-FDA approved mixed/multi-vitamins. RTO: with each Cycle of Trodelvy, on Day 8.  11/22/24 Patient had imaging done on 11/2024 . Upon review of PET/CT films, my interpretation is that patient has mixed response.  Some of the valentin disease and bone mets are improved but she has new and worsening bony lesions as well as fracture causing symptoms.  She has new lung metastases.  Imaging was reviewed in the tumor board and it was consensus to switch her treatment due to overall worsening disease. I reviewed these findings with the patient and daughter.  We will switch to eribulin She has worsening bilateral lower extremity swelling.  She had Doppler done recently to rule out DVT.  She is mostly sedentary due to pelvic fractures and pain.  She is taking pain medication for pain control.  Recommend Lasix for lower extremity swelling.  She is also referred to radiation oncology for radiation to painful bony left hip metastasis  She has malignant bone pain.  She is followed by palliative.  She is on Dilaudid and morphine.  She is able to shuffle slowly and get around.  Not able to bear weight. She had low hemoglobin which is stable now. she has met with orthopedic oncology and is refusing surgical fixation. 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 Denies any SOB or palpitations.  Port was replaced last week.  Lower extremity Doppler 11/2024 did not show evidence of DVT   12/6/2024: Patient presents for New Drug Eribulin, Day 1 Review of CBC revealed a Hgb of 7 g/dL. Discussed delay of treatment today given critical lab value/not meeting parameters to treat. PRBC infusion is necessary at this time. After much discussion surrounding the plan of care, it was suggested the patient be transferred to [facility] via Ambulance secondary to current physical state. The patient and daughter in agreement.  Patient reports she is no longer taking Eliquis OR Lasix daily, but rather every 2-3 days. The patient reports she is now taking Turmeric again because she prefers to be more homeopathic. Additionally, last prescription refill of Eliquis 5 mg refilled on 9/24/24 (30-day supply). Discussed importance of compliance with medication given history of port-related thrombus and hypercoagulable state given chemotherapy.  Ms. Singh reports she is not taking the Lasix as directed; believes it only helped a touch and wishes to seek referral to [facility] Lymphedema program. Swelling in bilateral lower extremities prevalent on PE. Lower extremity Doppler completed on 11/1/2024 did not show evidence of DVT.  Ms. Singh also reports some difficulty swallowing with mild edema being appreciated on the right side of her neck. Patient reports she almost contacted 911 this week secondary.  Ms. Singh is scheduled to undergo Palliative RT from the 19th through the 25th. Patient reports she is anxious about waiting until then given the level of pain she is experiencing secondary to bony metastases. Patient reports she has developed worsening LBP that radiates toward her right lower abdomen/pelvis. Additionally, she reports pain the now affects her left foot, specifically the dorsal aspect. She remains unable to walk, utilizing a wheelchair and rolling walker at home. Utilizing an ambulette to get to and from appointments.  Patient is UTD with Palliative Care. She is currently taking: MSER 30mg bid, hydromorphone 6mg 3-4 times per day and Tizanidine 1-2 times per day. Pain scale at today's appointment: 8/10.  RTC: Once released from [facility]. Will work with in-patient Oncology and schedule in office follow-up as appropriate.   12/2024 Patient was admitted at [facility] 2 weeks ago with severe bone pain. She had repeat scans in the hospital which showed known osseous metastatic disease and impacted pathologic right femoral fracture.  Ortho oncology saw the patient but patient declined surgical intervention.  She received radiation therapy completed 12/13/2024. She also had severe bilateral lower extremity swelling. Workup showed no DVT, preserved EF.  Lower extremity  swelling is felt to be secondary to lymphedema.  She is currently at a rehab. She is unable to walk. due to debility and swelling.   Pain is reasonably well-controlled.  She is eating okay.  She is hoping to start chemotherapy ASAP.  We discussed 2 to 3 weeks of physical therapy to get stronger.  Will start eribulin January 6.  She is on dexamethasone. Recommend to taper over 2 to 3 weeks  1/6/2025 Patient has completed radiation therapy last month.  She is currently in the rehab.  Still on dexamethasone.  Recommended to taper.  Here to start chemotherapy today.  Eribulin Risks benefits reviewed.  Informed consent obtained. Patient continues to have lower extremity swelling, DVT has been ruled out.  She is on Lasix with minimal improvement.  I recommend leg elevation, mobility as tolerated. She is on a stable pain regimen, followed by palliative Appetite good, weight stable Plan to  give 3-4 cycles of eribulin and repeat scans Xgeva to decrease the risk of skeletal metS, will add on day 1.  Every 6 weeks                                                                                                                                             Continue follow-up with palliative for pain control  1/2024 Presently, the patient reports she is doing well today, but did go to the [facility] ER on 1/10/25 c/o diffuse abdominal pain, diarrhea, and nausea. Blood work, Chest X-Ray and CT scan of the Abdomen and Pelvis were obtained.  CHEST X-RAY IMPRESSION: Right-sided CT compatible port with tip in the SVC. Elevated right hemidiaphragm. The lungs are clear. Heart is not enlarged considering technique and no effusions or pneumothorax were noted.  CT scan of the ABDOMEN & PELVIS IMPRESSION: Grossly stable study compared to 12/8/2024. 1.  Stable moderate right and moderate left hydronephrosis secondary to mid to distal ureteral encasement/obliteration by retroperitoneal soft tissue density. 2.  Stable retroperitoneal and pelvic lymphadenopathy and osseous metastatic disease. 3.  New small bilateral pleural effusions and severe anasarca suggestive of fluid overload state/third spacing. The patient was not admitted and released that evening.  Ms. Singh reports that she no longer wishes to undergo treatment with XGEVA. She believes that the injection caused abdominal swelling and her recent trip to the hospital for diffuse abdominal pain, nausea and diarrhea. We discussed that XGEVA is used to prevent skeletal-related events (SREs) in patients with bone metastases. I reiterated that common side effects of XGEVA include: fatigue, mild nausea, back pain, and jaw problems (osteonecrosis of the jaw) and that the XGEVA was likely not the root cause of her symptoms. Despite reiteration of the benefits of XGEVA, the patient wishes to decline additional treatment with this medication going forward. Of note, she reports this medication was offered to her while living in [place], where she too declined treatment.  Ms. Singh reports Dr. Lind and her team have been instrumental in managing her pain. She is scheduled to participate in a TEB on 30 January 2024. On PE exam today, the appearance of two new palpable cervical lymph nodes were noted (right neck). Patient continues to have bilateral lower extremity swelling, DVT has been ruled out. She is on Lasix with minimal improvement and wishes to discontinue medication.  I recommend leg elevation, mobility as tolerated. Office to discuss discontinuation of treatment with [facility] at [facility]. RTO: With Each Cycle of Eribulin; appointments made through mid-March 2025  [de-identified] : This is a very pleasant 61-year-old female diagnosed with metastatic triple negative breast cancer (metastatic disease to bones and lymph nodes) in July 2023, received Taxol Keytruda 7/202 3-1/2024, POD bones, Carboplatin Gemcitabine and Keytruda 2/2024 4-5/2024, then she decided to take a treatment break.  She has been off treatment and reports worsening of palpable metastatic lymph nodes and worsening bone pain. Trodelvy initiated on 8/23/2024.  Palliative RT to the bone 9/2024. Progression noted on PET Scan completed on 11/19/2024. Case discussed with Breast Tumor Board. Patient was to start Eribulin on 12/6/24; treatment delayed secondary to anemia and significant bone pain for which patient was admitted to hospital for symptom management/stabilization. Patient received additional palliative RT while hospitalized; treatment completed 12/13/2024. Patient released from the hospital and entered rehab in late December 2024. Eribulin started 1/6/25.   1/27/25 Ms. Singh presents today for Cycle 2 Day 1 of Eribulin + ONPRO.  She is accompanied by a member of the healthcare team from the [facility]. She reports significant fatigue and altered taste x 3-4 days after chemo. She was able to function, maintained PO intake, weight stable. She had mild nausea, took Reglan, no vomiting, no diarrhea or mouth sores. No Bone pain, no neuropathy, no fevers She reports right cervical LN are smaller bone pain is better  leg swelling is better, off steroids, on lasix 40  She is on ebony and mag suppls  She is refusing xgeva despite multiple discussions that it prevents SRE including fractures ( she states her tumors are determined )  PAin control with Dr Lind

## 2025-01-27 NOTE — ASSESSMENT
[FreeTextEntry1] : This is a very pleasant 61-year-old female diagnosed with metastatic triple negative breast cancer (metastatic disease to bones and lymph nodes) in July 2023, received Taxol Keytruda 7/202 3-1/2024, POD bones, Carboplatin Gemcitabine and Keytruda 2/202 4-5/2024, then she decided to take a treatment break.  She has been off treatment and reports worsening of palpable metastatic lymph nodes and worsening bone pain. Trodelvy initiated on 8/23/2024. Palliative RT to the bone 9/2024. Progression noted on PET Scan completed on 11/19/2024. Case discussed with Breast Tumor Board. Patient was to start Eribulin on 12/6/24; treatment delayed secondary to anemia and significant bone pain for which patient was admitted to hospital for symptom management/stabilization. Patient received additional palliative RT while hospitalized; treatment completed 12/13/2024. Patient released from the hospital and entered rehab in late December 2024. Eribulin started 1/6/25.   METASTATIC BREAST CA: Triple Negative (ER: Negative < 1%, KS: Negative < 1%, HER2: Negative, 1+ staining). Slide Review completed by [employer] on 8/14/2024. Original biopsy site: Right Axilla. Biopsy completed at [facility] on 7/12/2023.    1/27/25 Ms. Singh presents today for Cycle 2 Day 1 of Eribulin + ONPRO.  She is accompanied by a member of the healthcare team from the [facility]. She reports significant fatigue and altered taste x 3-4 days after chemo. She was able to function, maintained PO intake, weight stable. She had mild nausea, took Reglan, no vomiting, no diarrhea or mouth sores. No Bone pain, no neuropathy, no fevers She reports right cervical LN are smaller bone pain is better  leg swelling is better, off steroids, on lasix 40  She is on ebony and mag suppls  She is refusing xgeva despite multiple discussions that it prevents SRE including fractures ( she states her tumors are determined )  PAin control with Dr Lind    BILATERAL LOWER EXTREMITY SWELLING: Patient continues to have bilateral lower extremity swelling, DVT has been ruled out. She is on Lasix with minimal improvement and wishes to discontinue medication.  I recommend leg elevation, mobility as tolerated. Office to discuss discontinuation of treatment with attending physician at [facility].  [id number] testing demonstrated no actionable mutations Plan to re-biopsy on progression.   MALIGNANT BONE PAIN:  PET baseline 8/2024 d/w her. She has several pathologic fractures. Ms. Singh has established care with Palliative Care on 18 September 2024. She is currently taking: MSER 30mg bid, hydromorphone 6mg 3-4 times per day and Tizanidine 1-2 times per day.  Continue palliative care f/u; next visit scheduled for 30 JANUARY 2025.  CHEMOTHERAPY INDUCED NEUROPATHY: Mild. Continue Gabapentin, 300 mg TID.   GENETICS: [employer] Testing Results obtained from company on 1/7/2025. Patient underwent testing in July 2023. 47 genes tested with no deleterious mutations or VUS's identified. Results scanned into the EMR.   FATIGUE:  2/2 MALIGNANCY and treatment: encourage increase activity as tolerated. Mild fatigue tolerable/stable, energy waxes/wanes.  - Chemotherapy induced anemia- Grade 3. Recommend PRBC transfusion to maintain Hb> 8.   - Risk for Chemotherapy induced diarrhea- Imodium PRN. Maintain PO hydration. BRAT diet - Risk for Chemotherapy induced N/V- Will get pre-medications with Dexamethasone and Aloxi.  She will use Reglan as needed.  - GF induced bone pain- Patient to take Claritin Days 2-7. - Chemotherapy induced dysgeusia, weight loss and fatigue: Encourage oral fluids, small frequent meals.  - Chemo induced neuropathy- continue to monitor. - Alopecia- prescription for wig given. Patient utilizing head scarves/hats.  - Emotional support provided, all questions answered.  CHEMO AND BLOOD WORK ORDERED IN [place] FOR TODAY APPTS REVIEWED AND SCHEDULED DURING THIS VISIT

## 2025-01-27 NOTE — PHYSICAL EXAM
[Ambulatory and capable of all self care but unable to carry out any work activities] : Status 2- Ambulatory and capable of all self care but unable to carry out any work activities. Up and about more than 50% of waking hours [Normal] : affect appropriate [de-identified] : in wheelchair  [de-identified] : two new palpable cervical lymph nodes were noted (right neck)  [de-identified] : large right axillary and right cervical LN [de-identified] : bilateral edema prevalent in lower extremities [de-identified] : flat

## 2025-01-30 NOTE — PHYSICAL EXAM
[General Appearance - Alert] : alert [General Appearance - Well Nourished] : well nourished [General Appearance - Well Developed] : well developed [Sclera] : the sclera and conjunctiva were normal [Extraocular Movements] : extraocular movements were intact [Outer Ear] : the ears and nose were normal in appearance [Hearing Threshold Finger Rub Not [place]] : hearing was normal [] : no respiratory distress [Respiration, Rhythm And Depth] : normal respiratory rhythm and effort [Auscultation Breath Sounds / Voice Sounds] : lungs were clear to auscultation bilaterally [Heart Rate And Rhythm] : heart rate was normal and rhythm regular [Heart Sounds] : normal S1 and S2 [Full Pulse] : the pedal pulses are present [Bowel Sounds] : normal bowel sounds [Abdomen Soft] : soft [Abdomen Tenderness] : non-tender [No Focal Deficits] : no focal deficits [Oriented To Time, Place, And Person] : oriented to person, place, and time [Impaired Insight] : insight and judgment were intact [Affect] : the affect was normal [Mood] : the mood was normal [FreeTextEntry1] : TTP R thigh and hip. Limited ROM due to discomfort

## 2025-01-30 NOTE — PHYSICAL EXAM
[General Appearance - Alert] : alert [General Appearance - Well Nourished] : well nourished [General Appearance - Well Developed] : well developed [Sclera] : the sclera and conjunctiva were normal [Extraocular Movements] : extraocular movements were intact [Outer Ear] : the ears and nose were normal in appearance [Hearing Threshold Finger Rub Not Hendricks] : hearing was normal [] : no respiratory distress [Respiration, Rhythm And Depth] : normal respiratory rhythm and effort [Auscultation Breath Sounds / Voice Sounds] : lungs were clear to auscultation bilaterally [Heart Rate And Rhythm] : heart rate was normal and rhythm regular [Heart Sounds] : normal S1 and S2 [Full Pulse] : the pedal pulses are present [Bowel Sounds] : normal bowel sounds [Abdomen Soft] : soft [Abdomen Tenderness] : non-tender [No Focal Deficits] : no focal deficits [Oriented To Time, Place, And Person] : oriented to person, place, and time [Impaired Insight] : insight and judgment were intact [Affect] : the affect was normal [Mood] : the mood was normal [FreeTextEntry1] : TTP R thigh and hip. Limited ROM due to discomfort

## 2025-01-30 NOTE — PHYSICAL EXAM
[General Appearance - Alert] : alert [General Appearance - Well Nourished] : well nourished [General Appearance - Well Developed] : well developed [Sclera] : the sclera and conjunctiva were normal [Extraocular Movements] : extraocular movements were intact [Outer Ear] : the ears and nose were normal in appearance [Hearing Threshold Finger Rub Not Cat[place]] : hearing was normal [] : no respiratory distress [Respiration, Rhythm And Depth] : normal respiratory rhythm and effort [Auscultation Breath Sounds / Voice Sounds] : lungs were clear to auscultation bilaterally [Heart Rate And Rhythm] : heart rate was normal and rhythm regular [Heart Sounds] : normal S1 and S2 [Full Pulse] : the pedal pulses are present [Bowel Sounds] : normal bowel sounds [Abdomen Soft] : soft [Abdomen Tenderness] : non-tender [No Focal Deficits] : no focal deficits [Oriented To Time, Place, And Person] : oriented to person, place, and time [Impaired Insight] : insight and judgment were intact [Affect] : the affect was normal [Mood] : the mood was normal [FreeTextEntry1] : TTP R thigh and hip. Limited ROM due to discomfort

## 2025-01-31 NOTE — HISTORY OF PRESENT ILLNESS
[FreeTextEntry1] : 60yo F presents for follow up, referred by oncology.  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   Oncology hx: She presented with a right axillary mass and arm swelling in July 2023. She went to an emergency room in [place] and diagnosed with metastatic triple negative breast cancer. She received palliative Taxol and pembrolizumab in July 2023. She underwent a course of palliative radiation therapy to the lower spine, completing treatment in September 2023. Progression of disease was noted in January 2024, so treatment was switched to carboplatin gemcitabine and pembrolizumab in February 2024.  In May 2024, she relocated from [place] to [place]. She has resumed treatment under the care of Dr. Arciniega.  CT angiogram on 7/14/24 showed extensive right axillary and supraclavicular adenopathy measuring up to 4.9 cm. CT abdomen and pelvis showed multiple lytic bone lesions involving L5, the sacrum, pelvic bones and right femoral head.  PET/CT on 8/21/24 showed avid right supraclavicular, subpectoral, and axillary lymph nodes. Multiple lytic and soft tissue avid bone metastases were present associated with pathological fractures of the right superior pubic ramus and left proximal fibula. There were multiple lesions of the right breast and skin.  She started treatment with Trodelvy on 8/23/24.  [facility] visit 9/4: She is having pain from her waist down since March 2024. The pain is most severe in the right hip region and the left lower leg. The pain is worse with weight bearing. She cannot walk without assistance. She is also having pain in her right shoulder, clavicle region when she moves her arm a certain way. She is taking hydromorphone 4mg every 6 hours and gabapentin 300mg tid, with some temporary relief. She will meet with orthopedic surgery, Dr. Lauren, next week.  Ortho visit 9/11: 61F w/ multifocal skeletal metastatic breast ca to bone. The primary areas involved are the right femoral neck/head, R ilium, L posterior acetabulum, L ischium, L femoral shaft, and R scapula. The current PET was not a full body study therefore I have rec'd a bone scan for further assessment and to establish a baseline of her current skeletal disease involvement. She is symptomatic with regards to the right hip which is at risk for developing a pathologic fracture. I would not recommend IMN at this stage given the lytic region in the femoral head. For this reason the best surgical option would be right hip hemiarthroplasty- a procedure which can be done electively or if she breaks at some point in the future. At this stage the main focus is on delivering chemotherapy, a treatment which would be halted if we proceed with surgery. For this reason, I think it's best to hold off on surgery for now while she gets systemic treatment, with the understanding that she will require hemiarthroplasty if she develops a fracture in the future. I have also recommended that she follow up with a general surgeon with regards to the right axillary adenopathy for further evaluation.    Onc 10/11/24: Patient is here now for cycle 3D1. She was admitted at [facility] with intractable pain. she received palliative RT. Pain level is 5-7 out of 10. She is followed by palliative. She is on Dilaudid and morphine. She is able to shuffle slowly and get around. Not able to bear weight.  She states that her appetite and fatigue has improved secondary to transient chemo break. Weight is stable. No neuropathy or fevers. I recommend to complete 2 additional cycles of Trodelvy followed by repeat scans.  She had low hemoglobin which is stable now. she has met with orthopedic oncology and is refusing pending surgical fixation. 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 Denies any SOB or palpitations. ----------------------------------------------------  Pt was initially referred to supportive oncology for symptom management. She is in a wheelchair, but usually ambulates with a walker. She describes pain located in R hip, radiates to R knee and anterior thigh. No numbness. Described as severe ache and spasms. Currently at 7/10. Dilaudid brings it down to 7/10. Last taken around 1pm today. Following a schedule with Dilaudid 4mg, taking at 1am, 7am, 1pm, 7pm. Relief lasts about 4 hours. The last 5 days, pain has significantly worsened. Heating pads provide some relief. The pain never goes below 7/10, and at this level she is unable to carry out ADLs, interact with family or socialize. She is also taking Gabapentin 300mg TID. Using Senna and Miralax. Had BM yesterday. Denies nausea, vomiting. Poor appetite due to pain. Poor sleep, laying in bed a lot and waking up due to pain. Difficult to express emotions due to the pain, remains quiet.  Social: Lives with mom (Kathryn) and dad (Henrik Chisholm), daughter (Irina Singh), and son. Moved from [place] 3 months ago. Worked as a nurse in [place] Advance Directives / Decision Makers: Father and daughter help with medical decisions  10/14/24 Interval History: She was recently at [facility] for uncontrolled pain and radiation treatment. She is S/p 5 fractions of radiation, ended on 10/2/24. During that admission, she was seen by palliative care team and started on Morphine ER 30mg q8h, and Dilaudid PO 4-6mg q4h PRN. She states her pain seemed to be well controlled up until 2 days ago where the pain in her hip/legs have worsened. She denies any recent trauma or fall. She is taking Dilaudid PO 4mg once in the morning and Tizanidine 2mg as needed, on average once or less per day. When she takes the Dialudid 4mg PO in the morning, she gets 1-2 hrs of relief. She was hesitant to take it any more times per day because she was concerned to mix it with her long-acting morphine. She also continues on Gabapentin 300mg TID. She continues on a bowel regimen with regular BMs. She has no other concerns today. Next chemo is this Friday.  10/21/24 Interval History: Ran out of Morphine ER 10/16. Taking Dilaudid PO 4mg 1.5 tab about 2-3x daily. She states since being off the Morphine ER, she feels she may not need to restart a long acting. She believes the radiation may be helping her pain and she doesn't need a long acting. She takes Claritin after Nulesta injections with relief for about 5 days. She is also taking Tizanidine 2mg about once daily with relief. Admits to constipation, taking Senna sporadically but now taking 3 tab at bedtime.  11/1/2024 Interval History: Since d/c the MSER she reports pain has risen, stating it rises to 10/10. She has been utilizing hydromorphone 6mg three to four times daily. She utilizes Tizanidine 2mg once to twice daily with limited relief. She states the pain lessens if she lays still. She reports pain to sacral stage III continues to offset weight, apply barrier creams and dressings. Pain from the pressure injury often wakes her at night. She is eating two meals daily, denies N/V/D. Today she presents chairside with swelling to RLE, + pedal pulses. She states she is compliant with Eliquis regimen.   11/8/2024 Interval History: Pt seen in treatment room, accompanied by daughter. She states her pain is better controlled with the resumption of MSER 30mg BID, hydromorphone 6mg PRN which she utilizes once to twice daily, tizanidine 2mg sparingly. She reports pain, in particular, to her RLE with movement. She rates the pain as 'ok' in general with exacerbations with movement, rating 10/10 on occasion. She states the hydromorphone works well in controlling these spikes in pain. She reports moderate pain to her pressure injury and reports it is 'healing nicely'. She continues to offset her weight, apply barrier cream and apply mepilex dressing regularly. She reports difficulty sleeping, no more than three hours at a time, attributes to positioning and healing pressure injury. Moderate swelling continues to DESTINEY LE, encouraged to ambulate, elevate, and maintain protein intake. She states oral intake is low, forces herself to eat. She states she has little appetite and altered taste. Defers medical cannabis at this time. Denies N/V, reports bowels are regular every 1-2 days with bowel regimen in place.   11/22/ Interval History: Pt seen in treatment room, accompanied by her daughter.  Recent PET revealed mixed response noting areas of POD. She states there will be an alteration to her chemotherapy regimen, unsure at this time. She c/w MSER 30mg BID and utilizing hydromorphone 6mg twice daily. She describes the pain often as an intermittent cramping pain to the RLE, relieved by pain regimen and tizanidine 2mg which she reports as most helpful for cramps. RLE continues to be edematous, will begin furosemide.  She continues w in home PT with passive exercises, she is unable to bear weight at this time. States pressure ulcer is healed at this time, continues to offset weight as often as possible. Appetite remains stable, bowels are regular. Mood is stable and positive.   1/30/25 interval hx: Pt states she is taking Morphine 45mg every 8 hours ( shows IR, not ER). She is not aware she is taking IR instead of ER. She is requesting I call medical director at rehab to discuss dosing. She had treatment a few days ago and she feels weak. Using Lasix for anasarca. Taking dilaudid PO 6mg -- maybe 1-2x week. Has not been using tizanidine because not ordered at rehab, and also taking Gabepentin 300mg TID. She states her pain has worsened recently and the medicine isn't lasting as it usually does. Now recognizes maybe it could be because the medication is not long-acting currently. Denies other symptoms today.  [facility] - Dr. Lopes  I Stop Ref#: [id number]

## 2025-01-31 NOTE — ASSESSMENT
[FreeTextEntry1] : 60yo F with:  # Metastatic triple negative breast cancer - Primary areas involved are the right femoral neck/head, R ilium, L posterior acetabulum, L ischium, L femoral shaft, and R scapula - S/p radiation while at [facility] - On chemotherapy  # Pain - Will need to adjust morphine to ER instead of IR-- regimen is Morphine ER 45mg q8h - Continue Dilaudid PO 6mg - using infrequently  - Continue tizanidine 2mg, taking twice tablet daily, will encourage them to use at rehab center - Counseled on importance of maintaining bowel regularity in light of regular opioid use.  - Spoke with [facility] director of nursing who said she will have the medical staff transition patient back to ER dosing instead of IR ([phone])  # B/l lower extremity Neuropathy  - Gabapentin 300mg TID  # Constipation - Improved  # Debility - Pt is physically limited due to malignancy and its sequelae  - Currently in rehab  #LE Edema - DESTINEY LE Doppler US ordered R/O DVT - Remains on Eliquis 5mg daily for hx clot to port  # Encounter for Palliative Care - Explained the role of palliative care in enhancing quality of life in the setting of serious illness. Emotional support provided.     Follow up in 1 week . Instructed to call office with any questions or concerns.

## 2025-01-31 NOTE — HISTORY OF PRESENT ILLNESS
[FreeTextEntry1] : 62yo F presents for follow up, referred by oncology.  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   Oncology hx: She presented with a right axillary mass and arm swelling in July 2023. She went to an emergency room in [place] and diagnosed with metastatic triple negative breast cancer. She received palliative Taxol and pembrolizumab in July 2023. She underwent a course of palliative radiation therapy to the lower spine, completing treatment in September 2023. Progression of disease was noted in January 2024, so treatment was switched to carboplatin gemcitabine and pembrolizumab in February 2024.  In May 2024, she relocated from [place] to [place]. She has resumed treatment under the care of Dr. Arciniega.  CT angiogram on 7/14/24 showed extensive right axillary and supraclavicular adenopathy measuring up to 4.9 cm. CT abdomen and pelvis showed multiple lytic bone lesions involving L5, the sacrum, pelvic bones and right femoral head.  PET/CT on 8/21/24 showed avid right supraclavicular, subpectoral, and axillary lymph nodes. Multiple lytic and soft tissue avid bone metastases were present associated with pathological fractures of the right superior pubic ramus and left proximal fibula. There were multiple lesions of the right breast and skin.  She started treatment with Trodelvy on 8/23/24.  [facility] visit 9/4: She is having pain from her waist down since March 2024. The pain is most severe in the right hip region and the left lower leg. The pain is worse with weight bearing. She cannot walk without assistance. She is also having pain in her right shoulder, clavicle region when she moves her arm a certain way. She is taking hydromorphone 4mg every 6 hours and gabapentin 300mg tid, with some temporary relief. She will meet with orthopedic surgery, Dr. Lauren, next week.  Ortho visit 9/11: 61F w/ multifocal skeletal metastatic breast ca to bone. The primary areas involved are the right femoral neck/head, R ilium, L posterior acetabulum, L ischium, L femoral shaft, and R scapula. The current PET was not a full body study therefore I have rec'd a bone scan for further assessment and to establish a baseline of her current skeletal disease involvement. She is symptomatic with regards to the right hip which is at risk for developing a pathologic fracture. I would not recommend IMN at this stage given the lytic region in the femoral head. For this reason the best surgical option would be right hip hemiarthroplasty- a procedure which can be done electively or if she breaks at some point in the future. At this stage the main focus is on delivering chemotherapy, a treatment which would be halted if we proceed with surgery. For this reason, I think it's best to hold off on surgery for now while she gets systemic treatment, with the understanding that she will require hemiarthroplasty if she develops a fracture in the future. I have also recommended that she follow up with a general surgeon with regards to the right axillary adenopathy for further evaluation.    Onc 10/11/24: Patient is here now for cycle 3D1. She was admitted at [facility] with intractable pain. she received palliative RT. Pain level is 5-7 out of 10. She is followed by palliative. She is on Dilaudid and morphine. She is able to shuffle slowly and get around. Not able to bear weight.  She states that her appetite and fatigue has improved secondary to transient chemo break. Weight is stable. No neuropathy or fevers. I recommend to complete 2 additional cycles of Trodelvy followed by repeat scans.  She had low hemoglobin which is stable now. she has met with orthopedic oncology and is refusing pending surgical fixation. 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 Denies any SOB or palpitations. ----------------------------------------------------  Pt was initially referred to supportive oncology for symptom management. She is in a wheelchair, but usually ambulates with a walker. She describes pain located in R hip, radiates to R knee and anterior thigh. No numbness. Described as severe ache and spasms. Currently at 7/10. Dilaudid brings it down to 7/10. Last taken around 1pm today. Following a schedule with Dilaudid 4mg, taking at 1am, 7am, 1pm, 7pm. Relief lasts about 4 hours. The last 5 days, pain has significantly worsened. Heating pads provide some relief. The pain never goes below 7/10, and at this level she is unable to carry out ADLs, interact with family or socialize. She is also taking Gabapentin 300mg TID. Using Senna and Miralax. Had BM yesterday. Denies nausea, vomiting. Poor appetite due to pain. Poor sleep, laying in bed a lot and waking up due to pain. Difficult to express emotions due to the pain, remains quiet.  Social: Lives with mom (Kathryn) and dad (Henrik Chisholm), daughter (Irina Singh), and son. Moved from [place] 3 months ago. 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 She is S/p 5 fractions of radiation, ended on 10/2/24. During that admission, she was seen by palliative care team and started on Morphine ER 30mg q8h, and Dilaudid PO 4-6mg q4h PRN. She states her pain seemed to be well controlled up until 2 days ago where the pain in her hip/legs have worsened. She denies any recent trauma or fall. She is taking Dilaudid PO 4mg once in the morning and Tizanidine 2mg as needed, on average once or less per day. When she takes the Dialudid 4mg PO in the morning, she gets 1-2 hrs of relief. She was hesitant to take it any more times per day because she was concerned to mix it with her long-acting morphine. She also continues on Gabapentin 300mg TID. She continues on a bowel regimen with regular BMs. She has no other concerns today. Next chemo is this Friday.  10/21/24 Interval History: Ran out of Morphine ER 10/16. Taking Dilaudid PO 4mg 1.5 tab about 2-3x daily. She states since being off the Morphine ER, she feels she may not need to restart a long acting. She believes the radiation may be helping her pain and she doesn't need a long acting. She takes Claritin after Nulesta injections with relief for about 5 days. She is also taking Tizanidine 2mg about once daily with relief. Admits to constipation, taking Senna sporadically but now taking 3 tab at bedtime.  11/1/2024 Interval History: Since d/c the MSER she reports pain has risen, stating it rises to 10/10. She has been utilizing hydromorphone 6mg three to four times daily. She utilizes Tizanidine 2mg once to twice daily with limited relief. She states the pain lessens if she lays still. She reports pain to sacral stage III continues to offset weight, apply barrier creams and dressings. Pain from the pressure injury often wakes her at night. She is eating two meals daily, denies N/V/D. Today she presents chairside with swelling to RLE, + pedal pulses. She states she is compliant with Eliquis regimen.   11/8/2024 Interval History: Pt seen in treatment room, accompanied by daughter. She states her pain is better controlled with the resumption of MSER 30mg BID, hydromorphone 6mg PRN which she utilizes once to twice daily, tizanidine 2mg sparingly. She reports pain, in particular, to her RLE with movement. She rates the pain as 'ok' in general with exacerbations with movement, rating 10/10 on occasion. She states the hydromorphone works well in controlling these spikes in pain. She reports moderate pain to her pressure injury and reports it is 'healing nicely'. She continues to offset her weight, apply barrier cream and apply mepilex dressing regularly. She reports difficulty sleeping, no more than three hours at a time, attributes to positioning and healing pressure injury. Moderate swelling continues to DESTINEY LE, encouraged to ambulate, elevate, and maintain protein intake. She states oral intake is low, forces herself to eat. She states she has little appetite and altered taste. Defers medical cannabis at this time. Denies N/V, reports bowels are regular every 1-2 days with bowel regimen in place.   11/22/ Interval History: Pt seen in treatment room, accompanied by her daughter.  Recent PET revealed mixed response noting areas of POD. She states there will be an alteration to her chemotherapy regimen, unsure at this time. She c/w MSER 30mg BID and utilizing hydromorphone 6mg twice daily. She describes the pain often as an intermittent cramping pain to the RLE, relieved by pain regimen and tizanidine 2mg which she reports as most helpful for cramps. RLE continues to be edematous, will begin furosemide.  She continues w in home PT with passive exercises, she is unable to bear weight at this time. States pressure ulcer is healed at this time, continues to offset weight as often as possible. Appetite remains stable, bowels are regular. Mood is stable and positive.   1/30/25 interval hx: Pt states she is taking Morphine 45mg every 8 hours ( shows IR, not ER). She is not aware she is taking IR instead of ER. She is requesting I call medical director at rehab to discuss dosing. She had treatment a few days ago and she feels weak. Using Lasix for anasarca. Taking dilaudid PO 6mg -- maybe 1-2x week. Has not been using tizanidine because not ordered at rehab, and also taking Gabepentin 300mg TID. She states her pain has worsened recently and the medicine isn't lasting as it usually does. Now recognizes maybe it could be because the medication is not long-acting currently. Denies other symptoms today.  [facility] - Dr. Lopes  I Stop Ref#: [id number]

## 2025-01-31 NOTE — DATA REVIEWED
[FreeTextEntry1] : CT AP (1/10/2025)  FINDINGS: LOWER CHEST: Partially visualized Mediport tip in the SVC. New trace bilateral pleural effusions. Stable few groundglass nodules measuring up to 0.6 cm. Partially visualized likely stable right axillary lymph node measuring 3.2 cm (301:1).  LIVER: Indeterminant stable small hypodense lesion adjacent to the falciform ligament (301:22, 602:54). BILE DUCTS: Normal caliber. GALLBLADDER: Cholecystectomy. SPLEEN: Within normal limits. PANCREAS: Within normal limits. ADRENALS: Within normal limits. KIDNEYS/URETERS: Stable moderate right and mild left hydronephrosis. Mid to distal bilateral ureters are encased/obliterated by soft tissue density in the retroperitoneum. Delayed right nephrogram.  BLADDER: Indeterminant asymmetric right urinary bladder wall thickening. REPRODUCTIVE ORGANS: Limited evaluation of the uterus and adnexa secondary to diffuse fat stranding in the pelvis.  BOWEL: No bowel obstruction. Appendix is not visualized. No evidence of inflammation in the pericecal region. PERITONEUM/RETROPERITONEUM: No ascites. No pneumoperitoneum. Stable presacral fat stranding. 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 LYMPH NODES: Diffuse soft tissue density fat stranding of the retroperitoneal limits evaluation of discrete retroperitoneal lymphadenopathy, however the fat stranding likely represents diffuse matted lymphadenopathy. Unchanged right external iliac lymph node measuring 2.8 x 1.2 cm. ABDOMINAL WALL: Severe anasarca. BONES: Grossly similar appearance of osseous metastases, most pronounced in the bilateral pelvic bones. Unchanged impacted right femoral neck fracture.  IMPRESSION: Grossly stable study compared to 12/8/2024. 1. Stable moderate right and moderate left hydronephrosis secondary to mid to distal ureteral encasement/obliteration by retroperitoneal soft tissue density. 2. Stable retroperitoneal and pelvic lymphadenopathy and osseous metastatic disease. 3. New small bilateral pleural effusions and severe anasarca suggestive of of fluid overload state/third spacing.   --- End of Report ---

## 2025-01-31 NOTE — HISTORY OF PRESENT ILLNESS
[FreeTextEntry1] : 60yo F presents for follow up, referred by oncology.  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   Oncology hx: She presented with a right axillary mass and arm swelling in July 2023. She went to an emergency room in [place] and diagnosed with metastatic triple negative breast cancer. She received palliative Taxol and pembrolizumab in July 2023. She underwent a course of palliative radiation therapy to the lower spine, completing treatment in September 2023. Progression of disease was noted in January 2024, so treatment was switched to carboplatin gemcitabine and pembrolizumab in February 2024.  In May 2024, she relocated from [place] to [place]. She has resumed treatment under the care of Dr. Arciniega.  CT angiogram on 7/14/24 showed extensive right axillary and supraclavicular adenopathy measuring up to 4.9 cm. CT abdomen and pelvis showed multiple lytic bone lesions involving L5, the sacrum, pelvic bones and right femoral head.  PET/CT on 8/21/24 showed avid right supraclavicular, subpectoral, and axillary lymph nodes. Multiple lytic and soft tissue avid bone metastases were present associated with pathological fractures of the right superior pubic ramus and left proximal fibula. There were multiple lesions of the right breast and skin.  She started treatment with Trodelvy on 8/23/24.  [facility] visit 9/4: She is having pain from her waist down since March 2024. The pain is most severe in the right hip region and the left lower leg. The pain is worse with weight bearing. She cannot walk without assistance. She is also having pain in her right shoulder, clavicle region when she moves her arm a certain way. She is taking hydromorphone 4mg every 6 hours and gabapentin 300mg tid, with some temporary relief. She will meet with orthopedic surgery, Dr. Lauren, next week.  Ortho visit 9/11: 61F w/ multifocal skeletal metastatic breast ca to bone. The primary areas involved are the right femoral neck/head, R ilium, L posterior acetabulum, L ischium, L femoral shaft, and R scapula. The current PET was not a full body study therefore I have rec'd a bone scan for further assessment and to establish a baseline of her current skeletal disease involvement. She is symptomatic with regards to the right hip which is at risk for developing a pathologic fracture. I would not recommend IMN at this stage given the lytic region in the femoral head. For this reason the best surgical option would be right hip hemiarthroplasty- a procedure which can be done electively or if she breaks at some point in the future. At this stage the main focus is on delivering chemotherapy, a treatment which would be halted if we proceed with surgery. For this reason, I think it's best to hold off on surgery for now while she gets systemic treatment, with the understanding that she will require hemiarthroplasty if she develops a fracture in the future. I have also recommended that she follow up with a general surgeon with regards to the right axillary adenopathy for further evaluation.    Onc 10/11/24: Patient is here now for cycle 3D1. She was admitted at [facility] with intractable pain. she received palliative RT. Pain level is 5-7 out of 10. She is followed by palliative. She is on Dilaudid and morphine. She is able to shuffle slowly and get around. Not able to bear weight.  She states that her appetite and fatigue has improved secondary to transient chemo break. Weight is stable. No neuropathy or fevers. I recommend to complete 2 additional cycles of Trodelvy followed by repeat scans.  She had low hemoglobin which is stable now. she has met with orthopedic oncology and is refusing pending surgical fixation. 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 Denies any SOB or palpitations. ----------------------------------------------------  Pt was initially referred to supportive oncology for symptom management. She is in a wheelchair, but usually ambulates with a walker. She describes pain located in R hip, radiates to R knee and anterior thigh. No numbness. Described as severe ache and spasms. Currently at 7/10. Dilaudid brings it down to 7/10. Last taken around 1pm today. Following a schedule with Dilaudid 4mg, taking at 1am, 7am, 1pm, 7pm. Relief lasts about 4 hours. The last 5 days, pain has significantly worsened. Heating pads provide some relief. The pain never goes below 7/10, and at this level she is unable to carry out ADLs, interact with family or socialize. She is also taking Gabapentin 300mg TID. Using Senna and Miralax. Had BM yesterday. Denies nausea, vomiting. Poor appetite due to pain. Poor sleep, laying in bed a lot and waking up due to pain. Difficult to express emotions due to the pain, remains quiet.  Social: Lives with mom (Kathryn) and dad (Henrik Chisholm), daughter (Irina Singh), and son. Moved from [place] 3 months ago. Worked as a nurse in [place] Advance Directives / Decision Makers: Father and daughter help with medical decisions  10/14/24 Interval History: She was recently at [facility] for uncontrolled pain and radiation treatment. She is S/p 5 fractions of radiation, ended on 10/2/24. During that admission, she was seen by palliative care team and started on Morphine ER 30mg q8h, and Dilaudid PO 4-6mg q4h PRN. She states her pain seemed to be well controlled up until 2 days ago where the pain in her hip/legs have worsened. She denies any recent trauma or fall. She is taking Dilaudid PO 4mg once in the morning and Tizanidine 2mg as needed, on average once or less per day. When she takes the Dialudid 4mg PO in the morning, she gets 1-2 hrs of relief. She was hesitant to take it any more times per day because she was concerned to mix it with her long-acting morphine. She also continues on Gabapentin 300mg TID. She continues on a bowel regimen with regular BMs. She has no other concerns today. Next chemo is this Friday.  10/21/24 Interval History: Ran out of Morphine ER 10/16. Taking Dilaudid PO 4mg 1.5 tab about 2-3x daily. She states since being off the Morphine ER, she feels she may not need to restart a long acting. She believes the radiation may be helping her pain and she doesn't need a long acting. She takes Claritin after Nulesta injections with relief for about 5 days. She is also taking Tizanidine 2mg about once daily with relief. Admits to constipation, taking Senna sporadically but now taking 3 tab at bedtime.  11/1/2024 Interval History: Since d/c the MSER she reports pain has risen, stating it rises to 10/10. She has been utilizing hydromorphone 6mg three to four times daily. She utilizes Tizanidine 2mg once to twice daily with limited relief. She states the pain lessens if she lays still. She reports pain to sacral stage III continues to offset weight, apply barrier creams and dressings. Pain from the pressure injury often wakes her at night. She is eating two meals daily, denies N/V/D. Today she presents chairside with swelling to RLE, + pedal pulses. She states she is compliant with Eliquis regimen.   11/8/2024 Interval History: Pt seen in treatment room, accompanied by daughter. She states her pain is better controlled with the resumption of MSER 30mg BID, hydromorphone 6mg PRN which she utilizes once to twice daily, tizanidine 2mg sparingly. She reports pain, in particular, to her RLE with movement. She rates the pain as 'ok' in general with exacerbations with movement, rating 10/10 on occasion. She states the hydromorphone works well in controlling these spikes in pain. She reports moderate pain to her pressure injury and reports it is 'healing nicely'. She continues to offset her weight, apply barrier cream and apply mepilex dressing regularly. She reports difficulty sleeping, no more than three hours at a time, attributes to positioning and healing pressure injury. Moderate swelling continues to DESTINEY LE, encouraged to ambulate, elevate, and maintain protein intake. She states oral intake is low, forces herself to eat. She states she has little appetite and altered taste. Defers medical cannabis at this time. Denies N/V, reports bowels are regular every 1-2 days with bowel regimen in place.   11/22/ Interval History: Pt seen in treatment room, accompanied by her daughter.  Recent PET revealed mixed response noting areas of POD. She states there will be an alteration to her chemotherapy regimen, unsure at this time. She c/w MSER 30mg BID and utilizing hydromorphone 6mg twice daily. She describes the pain often as an intermittent cramping pain to the RLE, relieved by pain regimen and tizanidine 2mg which she reports as most helpful for cramps. RLE continues to be edematous, will begin furosemide.  She continues w in home PT with passive exercises, she is unable to bear weight at this time. States pressure ulcer is healed at this time, continues to offset weight as often as possible. Appetite remains stable, bowels are regular. Mood is stable and positive.   1/30/25 interval hx: Pt states she is taking Morphine 45mg every 8 hours ( shows IR, not ER). She is not aware she is taking IR instead of ER. She is requesting I call medical director at rehab to discuss dosing. She had treatment a few days ago and she feels weak. Using Lasix for anasarca. Taking dilaudid PO 6mg -- maybe 1-2x week. Has not been using tizanidine because not ordered at rehab, and also taking Gabepentin 300mg TID. She states her pain has worsened recently and the medicine isn't lasting as it usually does. Now recognizes maybe it could be because the medication is not long-acting currently. Denies other symptoms today.  [facility] - Dr. Lopes  I Stop Ref#: [id number]

## 2025-01-31 NOTE — ASSESSMENT
[FreeTextEntry1] : 60yo F with:  # Metastatic triple negative breast cancer - Primary areas involved are the right femoral neck/head, R ilium, L posterior acetabulum, L ischium, L femoral shaft, and R scapula - S/p radiation while at [facility] - On chemotherapy  # Pain - Will need to adjust morphine to ER instead of IR-- regimen is Morphine ER 45mg q8h - Continue Dilaudid PO 6mg - using infrequently  - Continue tizanidine 2mg, taking twice tablet daily, will encourage them to use at rehab center - Counseled on importance of maintaining bowel regularity in light of regular opioid use.  - Spoke with [facility] director of nursing who said she will have the medical staff transition patient back to ER dosing instead of IR ([phone])  # B/l lower extremity Neuropathy  - Gabapentin 300mg TID  # Constipation - Improved  # Debility - Pt is physically limited due to malignancy and its sequelae  - Currently in rehab  #LE Edema - DESTINEY LE Doppler US ordered R/O DVT - Remains on Eliquis 5mg daily for hx clot to port  # Encounter for Palliative Care - Explained the role of palliative care in enhancing quality of life in the setting of serious illness. Emotional support provided.     Follow up in 1 week . Instructed to call office with any questions or concerns.

## 2025-01-31 NOTE — ASSESSMENT
[FreeTextEntry1] : 60yo F with:  # Metastatic triple negative breast cancer - Primary areas involved are the right femoral neck/head, R ilium, L posterior acetabulum, L ischium, L femoral shaft, and R scapula - S/p radiation while at [facility] - On chemotherapy  # Pain - Will need to adjust morphine to ER instead of IR-- regimen is Morphine ER 45mg q8h - Continue Dilaudid PO 6mg - using infrequently  - Continue tizanidine 2mg, taking twice tablet daily, will encourage them to use at rehab center - Counseled on importance of maintaining bowel regularity in light of regular opioid use.  - Spoke with [facility] director of nursing who said she will have the medical staff transition patient back to ER dosing instead of IR ([phone])  # B/l lower extremity Neuropathy  - Gabapentin 300mg TID  # Constipation - Improved  # Debility - Pt is physically limited due to malignancy and its sequelae  - Currently in rehab  #LE Edema - DESTINEY LE Doppler US ordered R/O DVT - Remains on Eliquis 5mg daily for hx clot to port  # Encounter for Palliative Care - Explained the role of palliative care in enhancing quality of life in the setting of serious illness. Emotional support provided.     Follow up in 1 week . Instructed to call office with any questions or concerns.

## 2025-02-12 NOTE — HISTORY OF PRESENT ILLNESS
[Disease: _____________________] : Disease: [unfilled] [de-identified] :  This is a very pleasant 61-year-old female presented today for an evaluation for metastatic triple negative breast cancer.  Patient reports she noticed a large lump under her right arm and lymphedema in July 2023.  She went to the emergency room in [place] and workup showed metastatic triple negative breast cancer.  She underwent a right lymph node biopsy which was reportedly triple negative.  Biopsy report is not available for review.  She started palliative intent Taxol and pembrolizumab July 2023.  A PET/CT September 2023 showed mixed response, and she continued on the same regimen.  A PET/CT in January 2024 showed marked progression of disease in the bones.  Treatment was changed to carboplatin gemcitabine and pembrolizumab in February 2024.  She reportedly developed neutropenia and needed Neulasta for subsequent cycles.  PET/CT May 2024 showed stable disease.  Her last chemotherapy treatment was on 5/17/2024.  She decided to move to [place] close to family.  She has not had any treatment since then.  She reports she wanted to take a treatment break and has been taking holistic treatments.  She was disappointed that  neck and armpit masses grew during treatment break.  She was seen at [facility] last month and is referred here for continuation of care.  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  She is fasting 16 to 24 hours and has lost 40 pounds in the last 4 months.  She has significant right hip and left leg pain.  She is taking Dilaudid as needed and gabapentin.  Currently pain is 5 out of 10.  She walks with a cane and pain gets worse on walking after 1-2 blocks.  She had radiation to spine last year and developed significant toxicity and does not want to take radiation anymore. She is currently getting home PT. she reports skin discoloration from radiation and Neulasta.  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  Port has not been placed in 3 months.  She has double-lumen port.  She reports BRCA testing was done but she is not aware of results.  She lives with her parents and her daughter is supporting her.  8/23/2024 Patient presents for Trodelvy Cycle 1, Day 1. She is accompanied by her daughter. All questions asked/answered prior to initiation of treatment.  Patient underwent Port Study on 8/2124 at [facility].  Procedure Findings demonstrated a left-sided double port with tip of catheter in the SVC. The left port was accessed under sterile conditions with blood return and appropriate flushing was demonstrated. Hand injection of contrast demonstrates filling of port well and lumen without evidence of fibrin sheath. Unfortunately, multiple attempts were made with right port however it was unsuccessful. The plan per the Interventional Radiologist included: 1. Left port may be accessed and utilized for her upcoming treatment of chemotherapy. 2. Suggestion of possible replacement with a single lumen Mediport. Ms. Singh c/o of significant pain 10/10 on the pain scale. Reports she ran out of Hydromorphone and Gabapentin that was dispensed in the ER since last week. Ms. Singh reported that her pain was well controlled when taking Hydromorphone and Gabapentin as prescribed. Refills sent to [facility], and delivered at chairside. 4mg of Hydromorphone prescribed to be given at chairside prior to treatment. 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 Pathologic fractures in the right superior pubic ramus and left proximal fibula were noted on recent PET CT scan completed on 8/21/2024. Patient continues to take Eliquis daily (5 mg PO BID) for port r/t thrombus. Patient denies any s/s c/w easily bleeding or bruising.  Ms. Singh requested to speak with Nutrition today. Nutrition team was able to see the patient at the chairside. Appreciate note and recommendations. RTO: with each Cycle of Trodelvy, on Day 1. Appointments reviewed with patient and print out given.   8/28/2024 Patient presents today for repeat blood work and to complete [employer] Testing.  We again discussed the recent results of PTT/INR lab work that was significantly elevated on 8/23/2024. Patient was contacted on 8/26/2024 to initially discuss the elevated lab results. Ms. Singh reported that she was taking the Eliquis 5 mg PO BID as indicated. Upon questioning the patient further regarding the matter, she stated she ran out of the medication and needed a refill. Patient continues to deny as signs of bleeding or bruising. Results of PTT/INR today were WNL, and Eliquis 5mg PO BID was ordered to [facility].   Patient reported taking several supplements to include: Turmeric, Vitamin E, Black Seed Oil and several other non-FDA approved mixed/multi-vitamins up until Day 1 of treatment (8/23/2024). We discussed that these particular supplements may have unknown drug interactions with treatment and can also elevate both PTT & INR. The patient has since discontinued the supplements.   Discussed that [id number] testing is used to identify genomic alterations within the cancer's DNA that may make the patient eligible for a specific therapy/personalized treatment for her cancer. Patient verbalized consent and signed the necessary paperwork. Our office to convey results when they are made available.   Ms. Singh reports her pain is slightly improved since she started re-taking Dilaudid and Gabapentin as prescribed. Referral was made to Palliative Care, and an appointment was scheduled for 18 September 2024.   8/30/2024: day8 of Trodelvy,  She reports significant fatigue and altered taste x 3-4 days after chemo. She was able to function, maintained PO intake, weight stable. She had mild nausea, took Reglan, no vomiting, no diarrhea or mouth sores. No neuropathy, no fevers. Hb low, discussed PRBC tx PET baseline 8/2024 d/w her. She has several pathologic fractures. She doesnt want to go to ER, thinks they arent new. She has been hesitant about RT. Discussed importance of ortho onc consultation and pall RT for bone fractures and pain. No wt beaning until ortho consult.  She is on Eliquis 5 mg PO BID for port-related thrombus. PT/INR was significantly high, normalized on repeat labs  Has R shoulder, pelvis, LE pain somewhat controlled on Dilaudid every 4 hours, gabapentin Q3H. A. Pending palliative care appointment for pain management next month.  Does not have appetite but is taking small portions of meals. lso taking ensure Holding supplements 2 days before and after the chemo: Turmeric, Vitamin E, Black Seed Oil and several other non-FDA approved mixed/multi-vitamins up  [id number] testing sent during last visit Palliative Care, and an appointment was scheduled for 18 September 2024.   9/20/2024 Patient presents today for Cycle 2, Day 8 of Trodelvy.  She reports significant fatigue and altered taste x 3-4 days after chemo. She remains able to function, maintained PO intake, and her weight is stable. She continues to experience mild nausea, but it is improving, and no Reglan was needed after Cycle 2, Day 1. Denies fevers, vomiting, diarrhea, neuropathy or mouth sores.  Discussed low Hgb (8.1 g/dL), and possible need for PRBC transfusion. At this time, the patient denies being symptomatic and wishes to see if her bone marrow can recover on its own. She has been attending numerous appointments (Palliative, Orthopedics, RT) and is becoming overwhelmed.  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 Denies any SOB or palpitations. Ms. Singh has established care with Palliative Care on 18 September 2024 (follow-up scheduled in one week/Telemedicine Visit). Patient reports she enjoyed meeting this provider and was thankful for her recommendations to change the Hydromorphone from Q6 hours to Q4 hours. Additionally, she is now taking Tizanidine 2mg, 1 to 2 tablets every 8 hours for muscle spasms. She reports this medication has made a big difference and reports her overall pain is much more manageable/tolerable. Patient will also continue to take Gabapentin 300 mg TID. She is also now on a stricter bowel regimen taking Colace TID and Miralax daily. She is having BM's every other day.  Ms. Singh also met with Dr. Serena Bustos from Radiation Oncology on 9 September 2024. She reports being much more open to the idea of RT for pain relief and signed informed consent at the initial consultation. It was discussed at that visit a plan of care will be created once the patient established care with Dr. Lauren.  Ms. Singh established care with Dr. Subhash Lauren from Orthopedic Surgery. Patient was offered Right Hip Hemiarthroplasty and surgical stabilization of the Left femur. The patient declined surgical intervention and was instructed to return to the office in 1 month for repeat x-ray imaging.  Patient continues to hold supplements 2 days before and after the chemo: Turmeric, Vitamin E, Black Seed Oil and several other non-FDA approved mixed/multi-vitamins. RTO: with each Cycle of Trodelvy, on Day 8.  11/22/24 Patient had imaging done on 11/2024 . Upon review of PET/CT films, my interpretation is that patient has mixed response.  Some of the valentin disease and bone mets are improved but she has new and worsening bony lesions as well as fracture causing symptoms.  She has new lung metastases.  Imaging was reviewed in the tumor board and it was consensus to switch her treatment due to overall worsening disease. I reviewed these findings with the patient and daughter.  We will switch to eribulin She has worsening bilateral lower extremity swelling.  She had Doppler done recently to rule out DVT.  She is mostly sedentary due to pelvic fractures and pain.  She is taking pain medication for pain control.  Recommend Lasix for lower extremity swelling.  She is also referred to radiation oncology for radiation to painful bony left hip metastasis  She has malignant bone pain.  She is followed by palliative.  She is on Dilaudid and morphine.  She is able to shuffle slowly and get around.  Not able to bear weight. She had low hemoglobin which is stable now. she has met with orthopedic oncology and is refusing surgical fixation. 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 Denies any SOB or palpitations.  Port was replaced last week.  Lower extremity Doppler 11/2024 did not show evidence of DVT   12/6/2024: Patient presents for New Drug Eribulin, Day 1 Review of CBC revealed a Hgb of 7 g/dL. Discussed delay of treatment today given critical lab value/not meeting parameters to treat. PRBC infusion is necessary at this time. After much discussion surrounding the plan of care, it was suggested the patient be transferred to [facility] via Ambulance secondary to current physical state. The patient and daughter in agreement.  Patient reports she is no longer taking Eliquis OR Lasix daily, but rather every 2-3 days. The patient reports she is now taking Turmeric again because she prefers to be more homeopathic. Additionally, last prescription refill of Eliquis 5 mg refilled on 9/24/24 (30-day supply). Discussed importance of compliance with medication given history of port-related thrombus and hypercoagulable state given chemotherapy.  Ms. Singh reports she is not taking the Lasix as directed; believes it only helped a touch and wishes to seek referral to [facility] Lymphedema program. Swelling in bilateral lower extremities prevalent on PE. Lower extremity Doppler completed on 11/1/2024 did not show evidence of DVT.  Ms. Singh also reports some difficulty swallowing with mild edema being appreciated on the right side of her neck. Patient reports she almost contacted 911 this week secondary.  Ms. Singh is scheduled to undergo Palliative RT from the 19th through the 25th. Patient reports she is anxious about waiting until then given the level of pain she is experiencing secondary to bony metastases. Patient reports she has developed worsening LBP that radiates toward her right lower abdomen/pelvis. Additionally, she reports pain the now affects her left foot, specifically the dorsal aspect. She remains unable to walk, utilizing a wheelchair and rolling walker at home. Utilizing an ambulette to get to and from appointments.  Patient is UTD with Palliative Care. She is currently taking: MSER 30mg bid, hydromorphone 6mg 3-4 times per day and Tizanidine 1-2 times per day. Pain scale at today's appointment: 8/10.  RTC: Once released from [facility]. Will work with in-patient Oncology and schedule in office follow-up as appropriate.   12/2024 Patient was admitted at [facility] 2 weeks ago with severe bone pain. She had repeat scans in the hospital which showed known osseous metastatic disease and impacted pathologic right femoral fracture.  Ortho oncology saw the patient but patient declined surgical intervention.  She received radiation therapy completed 12/13/2024. She also had severe bilateral lower extremity swelling. Workup showed no DVT, preserved EF.  Lower extremity  swelling is felt to be secondary to lymphedema.  She is currently at a rehab. She is unable to walk. due to debility and swelling.   Pain is reasonably well-controlled.  She is eating okay.  She is hoping to start chemotherapy ASAP.  We discussed 2 to 3 weeks of physical therapy to get stronger.  Will start eribulin January 6.  She is on dexamethasone. Recommend to taper over 2 to 3 weeks  1/6/2025 Patient has completed radiation therapy last month.  She is currently in the rehab.  Still on dexamethasone.  Recommended to taper.  Here to start chemotherapy today.  Eribulin Risks benefits reviewed.  Informed consent obtained. Patient continues to have lower extremity swelling, DVT has been ruled out.  She is on Lasix with minimal improvement.  I recommend leg elevation, mobility as tolerated. She is on a stable pain regimen, followed by palliative Appetite good, weight stable Plan to  give 3-4 cycles of eribulin and repeat scans Xgeva to decrease the risk of skeletal metS, will add on day 1.  Every 6 weeks                                                                                                                                             Continue follow-up with palliative for pain control  1/2024 Presently, the patient reports she is doing well today, but did go to the [facility] ER on 1/10/25 c/o diffuse abdominal pain, diarrhea, and nausea. Blood work, Chest X-Ray and CT scan of the Abdomen and Pelvis were obtained.  CHEST X-RAY IMPRESSION: Right-sided CT compatible port with tip in the SVC. Elevated right hemidiaphragm. The lungs are clear. Heart is not enlarged considering technique and no effusions or pneumothorax were noted.  CT scan of the ABDOMEN & PELVIS IMPRESSION: Grossly stable study compared to 12/8/2024. 1.  Stable moderate right and moderate left hydronephrosis secondary to mid to distal ureteral encasement/obliteration by retroperitoneal soft tissue density. 2.  Stable retroperitoneal and pelvic lymphadenopathy and osseous metastatic disease. 3.  New small bilateral pleural effusions and severe anasarca suggestive of fluid overload state/third spacing. The patient was not admitted and released that evening.  Ms. Singh reports that she no longer wishes to undergo treatment with XGEVA. She believes that the injection caused abdominal swelling and her recent trip to the hospital for diffuse abdominal pain, nausea and diarrhea. We discussed that XGEVA is used to prevent skeletal-related events (SREs) in patients with bone metastases. I reiterated that common side effects of XGEVA include: fatigue, mild nausea, back pain, and jaw problems (osteonecrosis of the jaw) and that the XGEVA was likely not the root cause of her symptoms. Despite reiteration of the benefits of XGEVA, the patient wishes to decline additional treatment with this medication going forward. Of note, she reports this medication was offered to her while living in [place], where she too declined treatment.  Ms. Singh reports Dr. Lind and her team have been instrumental in managing her pain. She is scheduled to participate in a TEB on 30 January 2024. On PE exam today, the appearance of two new palpable cervical lymph nodes were noted (right neck). Patient continues to have bilateral lower extremity swelling, DVT has been ruled out. She is on Lasix with minimal improvement and wishes to discontinue medication.  I recommend leg elevation, mobility as tolerated. Office to discuss discontinuation of treatment with [facility]. RTO: With Each Cycle of Eribulin; appointments made through mid-March 2025  [de-identified] : This is a very pleasant 61-year-old female diagnosed with metastatic triple negative breast cancer (metastatic disease to bones and lymph nodes) in July 2023, received Taxol Keytruda 7/202 3-1/2024, POD bones, Carboplatin Gemcitabine and Keytruda 2/2024 4-5/2024, then she decided to take a treatment break.  She has been off treatment and reports worsening of palpable metastatic lymph nodes and worsening bone pain. Trodelvy initiated on 8/23/2024.  Palliative RT to the bone 9/2024. Progression noted on PET Scan completed on 11/19/2024. Case discussed with Breast Tumor Board. Patient was to start Eribulin on 12/6/24; treatment delayed secondary to anemia and significant bone pain for which patient was admitted to hospital for symptom management/stabilization. Patient received additional palliative RT while hospitalized; treatment completed 12/13/2024. Patient released from the hospital and entered rehab in late December 2024. Eribulin started 1/6/25.   2/12/2025 Patient received cycle 2-day 1 eribulin 2 weeks ago.  Her hemoglobin was 8 at that time and she was feeling good.  She came a week later and felt extremely exhausted.  Hemoglobin was 7.4.  She was sent to the hospital for urgent transfusion due to symptomatic anemia.  Workup also showed concern for GI bleed but it was later felt to be secondary to hemorrhoids.  A CT chest showed 1 mm increase in the size of pulmonary nodules.  I discussed with the patient today that she has had 2 doses of eribulin in the last 6 weeks.  It is too early to say if she is not responding to eribulin.  She did notice that a right cervical lymph node has decreased in size after chemotherapy.  I recommend to continue chemotherapy and try to stay on the schedule before determining response.  I offered her to come for transfusion in the next 3 to 5 days to avoid symptomatic anemia on day 8.  Patient declined PRBC transfusion at this time.  She was encouraged to call the office before day 8 chemotherapy if she develops symptoms from worsening anemia.  We will plan to give at least 2 cycles before repeating imaging. Pain is controlled and lymphedema is stable Ms. Singh presents today for Cycle 2 Day 1 of Eribulin + ONPRO.  She is accompanied by a member of the healthcare team from the [facility]. She is on ebony and mag suppls  She is refusing xgeva despite multiple discussions that it prevents SRE including fractures ( she states her tumors are determined )  PAin control with Dr Lind

## 2025-02-12 NOTE — ASSESSMENT
[FreeTextEntry1] : This is a very pleasant 61-year-old female diagnosed with metastatic triple negative breast cancer (metastatic disease to bones and lymph nodes) in July 2023, received Taxol Keytruda 7/202 3-1/2024, POD bones, Carboplatin Gemcitabine and Keytruda 2/202 4-5/2024, then she decided to take a treatment break.  She has been off treatment and reports worsening of palpable metastatic lymph nodes and worsening bone pain. Trodelvy initiated on 8/23/2024. Palliative RT to the bone 9/2024. Progression noted on PET Scan completed on 11/19/2024. Case discussed with Breast Tumor Board. Patient was to start Eribulin on 12/6/24; treatment delayed secondary to anemia and significant bone pain for which patient was admitted to hospital for symptom management/stabilization. Patient received additional palliative RT while hospitalized; treatment completed 12/13/2024. Patient released from the hospital and entered rehab in late December 2024. Eribulin started 1/6/25.   METASTATIC BREAST CA: Triple Negative (ER: Negative < 1%, WA: Negative < 1%, HER2: Negative, 1+ staining). Slide Review completed by studentSN on 8/14/2024. Original biopsy site: Right Axilla. Biopsy completed at [facility] on 7/12/2023.   2/12/2025 Patient received cycle 2-day 1 eribulin 2 weeks ago.  Her hemoglobin was 8 at that time and she was feeling good.  She came a week later and felt extremely exhausted.  Hemoglobin was 7.4.  She was sent to the hospital for urgent transfusion due to symptomatic anemia.  Workup also showed concern for GI bleed but it was later felt to be secondary to hemorrhoids.  A CT chest showed 1 mm increase in the size of pulmonary nodules.  I discussed with the patient today that she has had 2 doses of eribulin in the last 6 weeks.  It is too early to say if she is not responding to eribulin.  She did notice that a right cervical lymph node has decreased in size after chemotherapy.  I recommend to continue chemotherapy and try to stay on the schedule before determining progression or response.  I offered her to come for transfusion in the next 3 to 5 days to avoid symptomatic anemia on day 8.  Patient declined PRBC transfusion at this time.  She was encouraged to call the office before day 8 chemotherapy if she develops symptoms from worsening anemia.  We will plan to give at least 2 cycles before repeating imaging. Pain is controlled and lymphedema is stable  [id number] testing demonstrated no actionable mutations Plan to re-biopsy on progression.    CBC reviewed with the patient today to make sure she is not neutropenic from high risk cancer therapy drug.  We will continue to monitor CBC every 2 to 4 weeks for intense drug monitoring. Patient is on cytotoxic chemotherapy and needs intensive monitoring for severe toxicity.  BILATERAL LOWER EXTREMITY SWELLING:  Patient continues to have bilateral lower extremity swelling, DVT has been ruled out. She is on Lasix with minimal improvement and wishes to discontinue medication.  I recommend leg elevation, mobility as tolerated.     MALIGNANT BONE PAIN:  PET baseline 8/2024 d/w her. She has several pathologic fractures. Ms. Singh has established care with Palliative Care on 18 September 2024. She is currently taking: MSER 30mg bid, hydromorphone 6mg 3-4 times per day and Tizanidine 1-2 times per day.  Continue palliative care f/u; next visit scheduled for 30 JANUARY 2025.  CHEMOTHERAPY INDUCED NEUROPATHY: Mild. Continue Gabapentin, 300 mg TID.   GENETICS: [employer] [facility] [employer] Genetic Testing Results obtained from company on 1/7/2025. Patient underwent testing in July 2023. 47 genes tested with no deleterious mutations or VUS's identified. Results scanned into the EMR.   FATIGUE:  2/2 MALIGNANCY and treatment: encourage increase activity as tolerated. Mild fatigue tolerable/stable, energy waxes/wanes.  - Chemotherapy induced anemia- Grade 3. Recommend PRBC transfusion to maintain Hb> 8.   - Risk for Chemotherapy induced diarrhea- Imodium PRN. Maintain PO hydration. BRAT diet - Risk for Chemotherapy induced N/V- Will get pre-medications with Dexamethasone and Aloxi.  She will use Reglan as needed.  - GF induced bone pain- Patient to take Claritin Days 2-7. - Chemotherapy induced dysgeusia, weight loss and fatigue: Encourage oral fluids, small frequent meals.  - Chemo induced neuropathy- continue to monitor. - Alopecia- prescription for wig given. Patient utilizing head scarves/hats.  - Emotional support provided, all questions answered.  CHEMO AND BLOOD WORK ORDERED IN [place] FOR TODAY APPTS REVIEWED AND SCHEDULED DURING THIS VISIT

## 2025-02-12 NOTE — PHYSICAL EXAM
[Ambulatory and capable of all self care but unable to carry out any work activities] : Status 2- Ambulatory and capable of all self care but unable to carry out any work activities. Up and about more than 50% of waking hours [Normal] : affect appropriate [de-identified] : in wheelchair  [de-identified] : two new palpable cervical lymph nodes were noted (right neck)  [de-identified] : large right axillary and right cervical LN [de-identified] : bilateral edema prevalent in lower extremities [de-identified] : flat

## 2025-02-12 NOTE — PHYSICAL EXAM
[Ambulatory and capable of all self care but unable to carry out any work activities] : Status 2- Ambulatory and capable of all self care but unable to carry out any work activities. Up and about more than 50% of waking hours [Normal] : affect appropriate [de-identified] : in wheelchair  [de-identified] : two new palpable cervical lymph nodes were noted (right neck)  [de-identified] : large right axillary and right cervical LN [de-identified] : bilateral edema prevalent in lower extremities [de-identified] : flat

## 2025-02-12 NOTE — HISTORY OF PRESENT ILLNESS
[Disease: _____________________] : Disease: [unfilled] [de-identified] :  This is a very pleasant 61-year-old female presented today for an evaluation for metastatic triple negative breast cancer.  Patient reports she noticed a large lump under her right arm and lymphedema in July 2023.  She went to the emergency room in [place] and workup showed metastatic triple negative breast cancer.  She underwent a right lymph node biopsy which was reportedly triple negative.  Biopsy report is not available for review.  She started palliative intent Taxol and pembrolizumab July 2023.  A PET/CT September 2023 showed mixed response, and she continued on the same regimen.  A PET/CT in January 2024 showed marked progression of disease in the bones.  Treatment was changed to carboplatin gemcitabine and pembrolizumab in February 2024.  She reportedly developed neutropenia and needed Neulasta for subsequent cycles.  PET/CT May 2024 showed stable disease.  Her last chemotherapy treatment was on 5/17/2024.  She decided to move to [place] close to family.  She has not had any treatment since then.  She reports she wanted to take a treatment break and has been taking holistic treatments.  She was disappointed that  neck and armpit masses grew during treatment break.  She was seen at [facility] last month and is referred here for continuation of care.  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  She is fasting 16 to 24 hours and has lost 40 pounds in the last 4 months.  She has significant right hip and left leg pain.  She is taking Dilaudid as needed and gabapentin.  Currently pain is 5 out of 10.  She walks with a cane and pain gets worse on walking after 1-2 blocks.  She had radiation to spine last year and developed significant toxicity and does not want to take radiation anymore. She is currently getting home PT. she reports skin discoloration from radiation and Neulasta.  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  Port has not been placed in 3 months.  She has double-lumen port.  She reports BRCA testing was done but she is not aware of results.  She lives with her parents and her daughter is supporting her.  8/23/2024 Patient presents for Trodelvy Cycle 1, Day 1. She is accompanied by her daughter. All questions asked/answered prior to initiation of treatment.  Patient underwent Port Study on 8/2124 at [facility].  Procedure Findings demonstrated a left-sided double port with tip of catheter in the SVC. The left port was accessed under sterile conditions with blood return and appropriate flushing was demonstrated. Hand injection of contrast demonstrates filling of port well and lumen without evidence of fibrin sheath. Unfortunately, multiple attempts were made with right port however it was unsuccessful. The plan per the Interventional Radiologist included: 1. Left port may be accessed and utilized for her upcoming treatment of chemotherapy. 2. Suggestion of possible replacement with a single lumen Mediport. Ms. Singh c/o of significant pain 10/10 on the pain scale. Reports she ran out of Hydromorphone and Gabapentin that was dispensed in the ER since last week. Ms. Singh reported that her pain was well controlled when taking Hydromorphone and Gabapentin as prescribed. Refills sent to [facility], and delivered at chairside. 4mg of Hydromorphone prescribed to be given at chairside prior to treatment. 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 Pathologic fractures in the right superior pubic ramus and left proximal fibula were noted on recent PET CT scan completed on 8/21/2024. Patient continues to take Eliquis daily (5 mg PO BID) for port r/t thrombus. Patient denies any s/s c/w easily bleeding or bruising.  Ms. Singh requested to speak with Nutrition today. Nutrition team was able to see the patient at the chairside. Appreciate note and recommendations. RTO: with each Cycle of Trodelvy, on Day 1. Appointments reviewed with patient and print out given.   8/28/2024 Patient presents today for repeat blood work and to complete [employer] Testing.  We again discussed the recent results of PTT/INR lab work that was significantly elevated on 8/23/2024. Patient was contacted on 8/26/2024 to initially discuss the elevated lab results. Ms. Singh reported that she was taking the Eliquis 5 mg PO BID as indicated. Upon questioning the patient further regarding the matter, she stated she ran out of the medication and needed a refill. Patient continues to deny as signs of bleeding or bruising. Results of PTT/INR today were WNL, and Eliquis 5mg PO BID was ordered to [facility].   Patient reported taking several supplements to include: Turmeric, Vitamin E, Black Seed Oil and several other non-FDA approved mixed/multi-vitamins up until Day 1 of treatment (8/23/2024). We discussed that these particular supplements may have unknown drug interactions with treatment and can also elevate both PTT & INR. The patient has since discontinued the supplements.   Discussed that [id number] testing is used to identify genomic alterations within the cancer's DNA that may make the patient eligible for a specific therapy/personalized treatment for her cancer. Patient verbalized consent and signed the necessary paperwork. Our office to convey results when they are made available.   Ms. Singh reports her pain is slightly improved since she started re-taking Dilaudid and Gabapentin as prescribed. Referral was made to Palliative Care, and an appointment was scheduled for 18 September 2024.   8/30/2024: day8 of Trodelvy,  She reports significant fatigue and altered taste x 3-4 days after chemo. She was able to function, maintained PO intake, weight stable. She had mild nausea, took Reglan, no vomiting, no diarrhea or mouth sores. No neuropathy, no fevers. Hb low, discussed PRBC tx PET baseline 8/2024 d/w her. She has several pathologic fractures. She doesnt want to go to ER, thinks they arent new. She has been hesitant about RT. Discussed importance of ortho onc consultation and pall RT for bone fractures and pain. No wt beaning until ortho consult.  She is on Eliquis 5 mg PO BID for port-related thrombus. PT/INR was significantly high, normalized on repeat labs  Has R shoulder, pelvis, LE pain somewhat controlled on Dilaudid every 4 hours, gabapentin Q3H. A. Pending palliative care appointment for pain management next month.  Does not have appetite but is taking small portions of meals. lso taking ensure Holding supplements 2 days before and after the chemo: Turmeric, Vitamin E, Black Seed Oil and several other non-FDA approved mixed/multi-vitamins up  [id number] testing sent during last visit Palliative Care, and an appointment was scheduled for 18 September 2024.   9/20/2024 Patient presents today for Cycle 2, Day 8 of Trodelvy.  She reports significant fatigue and altered taste x 3-4 days after chemo. She remains able to function, maintained PO intake, and her weight is stable. She continues to experience mild nausea, but it is improving, and no Reglan was needed after Cycle 2, Day 1. Denies fevers, vomiting, diarrhea, neuropathy or mouth sores.  Discussed low Hgb (8.1 g/dL), and possible need for PRBC transfusion. At this time, the patient denies being symptomatic and wishes to see if her bone marrow can recover on its own. She has been attending numerous appointments (Palliative, Orthopedics, RT) and is becoming overwhelmed.  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 Denies any SOB or palpitations. Ms. Singh has established care with Palliative Care on 18 September 2024 (follow-up scheduled in one week/Telemedicine Visit). Patient reports she enjoyed meeting this provider and was thankful for her recommendations to change the Hydromorphone from Q6 hours to Q4 hours. Additionally, she is now taking Tizanidine 2mg, 1 to 2 tablets every 8 hours for muscle spasms. She reports this medication has made a big difference and reports her overall pain is much more manageable/tolerable. Patient will also continue to take Gabapentin 300 mg TID. She is also now on a stricter bowel regimen taking Colace TID and Miralax daily. She is having BM's every other day.  Ms. Singh also met with Dr. Serena Bustos from Radiation Oncology on 9 September 2024. She reports being much more open to the idea of RT for pain relief and signed informed consent at the initial consultation. It was discussed at that visit a plan of care will be created once the patient established care with Dr. Lauren.  Ms. Singh established care with Dr. Subhash Lauren from Orthopedic Surgery. Patient was offered Right Hip Hemiarthroplasty and surgical stabilization of the Left femur. The patient declined surgical intervention and was instructed to return to the office in 1 month for repeat x-ray imaging.  Patient continues to hold supplements 2 days before and after the chemo: Turmeric, Vitamin E, Black Seed Oil and several other non-FDA approved mixed/multi-vitamins. RTO: with each Cycle of Trodelvy, on Day 8.  11/22/24 Patient had imaging done on 11/2024 . Upon review of PET/CT films, my interpretation is that patient has mixed response.  Some of the valentin disease and bone mets are improved but she has new and worsening bony lesions as well as fracture causing symptoms.  She has new lung metastases.  Imaging was reviewed in the tumor board and it was consensus to switch her treatment due to overall worsening disease. I reviewed these findings with the patient and daughter.  We will switch to eribulin She has worsening bilateral lower extremity swelling.  She had Doppler done recently to rule out DVT.  She is mostly sedentary due to pelvic fractures and pain.  She is taking pain medication for pain control.  Recommend Lasix for lower extremity swelling.  She is also referred to radiation oncology for radiation to painful bony left hip metastasis  She has malignant bone pain.  She is followed by palliative.  She is on Dilaudid and morphine.  She is able to shuffle slowly and get around.  Not able to bear weight. She had low hemoglobin which is stable now. she has met with orthopedic oncology and is refusing surgical fixation. 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 Denies any SOB or palpitations.  Port was replaced last week.  Lower extremity Doppler 11/2024 did not show evidence of DVT   12/6/2024: Patient presents for New Drug Eribulin, Day 1 Review of CBC revealed a Hgb of 7 g/dL. Discussed delay of treatment today given critical lab value/not meeting parameters to treat. PRBC infusion is necessary at this time. After much discussion surrounding the plan of care, it was suggested the patient be transferred to [facility] via Ambulance secondary to current physical state. The patient and daughter in agreement.  Patient reports she is no longer taking Eliquis OR Lasix daily, but rather every 2-3 days. The patient reports she is now taking Turmeric again because she prefers to be more homeopathic. Additionally, last prescription refill of Eliquis 5 mg refilled on 9/24/24 (30-day supply). Discussed importance of compliance with medication given history of port-related thrombus and hypercoagulable state given chemotherapy.  Ms. Singh reports she is not taking the Lasix as directed; believes it only helped a touch and wishes to seek referral to [facility] Lymphedema program. Swelling in bilateral lower extremities prevalent on PE. Lower extremity Doppler completed on 11/1/2024 did not show evidence of DVT.  Ms. Singh also reports some difficulty swallowing with mild edema being appreciated on the right side of her neck. Patient reports she almost contacted 911 this week secondary.  Ms. Singh is scheduled to undergo Palliative RT from the 19th through the 25th. Patient reports she is anxious about waiting until then given the level of pain she is experiencing secondary to bony metastases. Patient reports she has developed worsening LBP that radiates toward her right lower abdomen/pelvis. Additionally, she reports pain the now affects her left foot, specifically the dorsal aspect. She remains unable to walk, utilizing a wheelchair and rolling walker at home. Utilizing an ambulette to get to and from appointments.  Patient is UTD with Palliative Care. She is currently taking: MSER 30mg bid, hydromorphone 6mg 3-4 times per day and Tizanidine 1-2 times per day. Pain scale at today's appointment: 8/10.  RTC: Once released from [facility]. Will work with in-patient Oncology and schedule in office follow-up as appropriate.   12/2024 Patient was admitted at [facility] 2 weeks ago with severe bone pain. She had repeat scans in the hospital which showed known osseous metastatic disease and impacted pathologic right femoral fracture.  Ortho oncology saw the patient but patient declined surgical intervention.  She received radiation therapy completed 12/13/2024. She also had severe bilateral lower extremity swelling. Workup showed no DVT, preserved EF.  Lower extremity  swelling is felt to be secondary to lymphedema.  She is currently at a rehab. She is unable to walk. due to debility and swelling.   Pain is reasonably well-controlled.  She is eating okay.  She is hoping to start chemotherapy ASAP.  We discussed 2 to 3 weeks of physical therapy to get stronger.  Will start eribulin January 6.  She is on dexamethasone. Recommend to taper over 2 to 3 weeks  1/6/2025 Patient has completed radiation therapy last month.  She is currently in the rehab.  Still on dexamethasone.  Recommended to taper.  Here to start chemotherapy today.  Eribulin Risks benefits reviewed.  Informed consent obtained. Patient continues to have lower extremity swelling, DVT has been ruled out.  She is on Lasix with minimal improvement.  I recommend leg elevation, mobility as tolerated. She is on a stable pain regimen, followed by palliative Appetite good, weight stable Plan to  give 3-4 cycles of eribulin and repeat scans Xgeva to decrease the risk of skeletal metS, will add on day 1.  Every 6 weeks                                                                                                                                             Continue follow-up with palliative for pain control  1/2024 Presently, the patient reports she is doing well today, but did go to the [facility] ER on 1/10/25 c/o diffuse abdominal pain, diarrhea, and nausea. Blood work, Chest X-Ray and CT scan of the Abdomen and Pelvis were obtained.  CHEST X-RAY IMPRESSION: Right-sided CT compatible port with tip in the SVC. Elevated right hemidiaphragm. The lungs are clear. Heart is not enlarged considering technique and no effusions or pneumothorax were noted.  CT scan of the ABDOMEN & PELVIS IMPRESSION: Grossly stable study compared to 12/8/2024. 1.  Stable moderate right and moderate left hydronephrosis secondary to mid to distal ureteral encasement/obliteration by retroperitoneal soft tissue density. 2.  Stable retroperitoneal and pelvic lymphadenopathy and osseous metastatic disease. 3.  New small bilateral pleural effusions and severe anasarca suggestive of fluid overload state/third spacing. The patient was not admitted and released that evening.  Ms. Singh reports that she no longer wishes to undergo treatment with XGEVA. She believes that the injection caused abdominal swelling and her recent trip to the hospital for diffuse abdominal pain, nausea and diarrhea. We discussed that XGEVA is used to prevent skeletal-related events (SREs) in patients with bone metastases. I reiterated that common side effects of XGEVA include: fatigue, mild nausea, back pain, and jaw problems (osteonecrosis of the jaw) and that the XGEVA was likely not the root cause of her symptoms. Despite reiteration of the benefits of XGEVA, the patient wishes to decline additional treatment with this medication going forward. Of note, she reports this medication was offered to her while living in [place], where she too declined treatment.  Ms. Singh reports Dr. Lind and her team have been instrumental in managing her pain. She is scheduled to participate in a TEB on 30 January 2024. On PE exam today, the appearance of two new palpable cervical lymph nodes were noted (right neck). Patient continues to have bilateral lower extremity swelling, DVT has been ruled out. She is on Lasix with minimal improvement and wishes to discontinue medication.  I recommend leg elevation, mobility as tolerated. Office to discuss discontinuation of treatment with [facility] at [facility]. RTO: With Each Cycle of Eribulin; appointments made through mid-March 2025  [de-identified] : This is a very pleasant 61-year-old female diagnosed with metastatic triple negative breast cancer (metastatic disease to bones and lymph nodes) in July 2023, received Taxol Keytruda 7/202 3-1/2024, POD bones, Carboplatin Gemcitabine and Keytruda 2/2024 4-5/2024, then she decided to take a treatment break.  She has been off treatment and reports worsening of palpable metastatic lymph nodes and worsening bone pain. Trodelvy initiated on 8/23/2024.  Palliative RT to the bone 9/2024. Progression noted on PET Scan completed on 11/19/2024. Case discussed with Breast Tumor Board. Patient was to start Eribulin on 12/6/24; treatment delayed secondary to anemia and significant bone pain for which patient was admitted to hospital for symptom management/stabilization. Patient received additional palliative RT while hospitalized; treatment completed 12/13/2024. Patient released from the hospital and entered rehab in late December 2024. Eribulin started 1/6/25.   2/12/2025 Patient received cycle 2-day 1 eribulin 2 weeks ago.  Her hemoglobin was 8 at that time and she was feeling good.  She came a week later and felt extremely exhausted.  Hemoglobin was 7.4.  She was sent to the hospital for urgent transfusion due to symptomatic anemia.  Workup also showed concern for GI bleed but it was later felt to be secondary to hemorrhoids.  A CT chest showed 1 mm increase in the size of pulmonary nodules.  I discussed with the patient today that she has had 2 doses of eribulin in the last 6 weeks.  It is too early to say if she is not responding to eribulin.  She did notice that a right cervical lymph node has decreased in size after chemotherapy.  I recommend to continue chemotherapy and try to stay on the schedule before determining response.  I offered her to come for transfusion in the next 3 to 5 days to avoid symptomatic anemia on day 8.  Patient declined PRBC transfusion at this time.  She was encouraged to call the office before day 8 chemotherapy if she develops symptoms from worsening anemia.  We will plan to give at least 2 cycles before repeating imaging. Pain is controlled and lymphedema is stable Ms. Singh presents today for Cycle 2 Day 1 of Eribulin + ONPRO.  She is accompanied by a member of the healthcare team from the [facility]. She is on ebony and mag suppls  She is refusing xgeva despite multiple discussions that it prevents SRE including fractures ( she states her tumors are determined )  PAin control with Dr Lind

## 2025-02-12 NOTE — ASSESSMENT
[FreeTextEntry1] : This is a very pleasant 61-year-old female diagnosed with metastatic triple negative breast cancer (metastatic disease to bones and lymph nodes) in July 2023, received Taxol Keytruda 7/202 3-1/2024, POD bones, Carboplatin Gemcitabine and Keytruda 2/202 4-5/2024, then she decided to take a treatment break.  She has been off treatment and reports worsening of palpable metastatic lymph nodes and worsening bone pain. Trodelvy initiated on 8/23/2024. Palliative RT to the bone 9/2024. Progression noted on PET Scan completed on 11/19/2024. Case discussed with Breast Tumor Board. Patient was to start Eribulin on 12/6/24; treatment delayed secondary to anemia and significant bone pain for which patient was admitted to hospital for symptom management/stabilization. Patient received additional palliative RT while hospitalized; treatment completed 12/13/2024. Patient released from the hospital and entered rehab in late December 2024. Eribulin started 1/6/25.   METASTATIC BREAST CA: Triple Negative (ER: Negative < 1%, MS: Negative < 1%, HER2: Negative, 1+ staining). Slide Review completed by [facility] on 8/14/2024. Original biopsy site: Right Axilla. Biopsy completed at [facility] on 7/12/2023.   2/12/2025 Patient received cycle 2-day 1 eribulin 2 weeks ago.  Her hemoglobin was 8 at that time and she was feeling good.  She came a week later and felt extremely exhausted.  Hemoglobin was 7.4.  She was sent to the hospital for urgent transfusion due to symptomatic anemia.  Workup also showed concern for GI bleed but it was later felt to be secondary to hemorrhoids.  A CT chest showed 1 mm increase in the size of pulmonary nodules.  I discussed with the patient today that she has had 2 doses of eribulin in the last 6 weeks.  It is too early to say if she is not responding to eribulin.  She did notice that a right cervical lymph node has decreased in size after chemotherapy.  I recommend to continue chemotherapy and try to stay on the schedule before determining progression or response.  I offered her to come for transfusion in the next 3 to 5 days to avoid symptomatic anemia on day 8.  Patient declined PRBC transfusion at this time.  She was encouraged to call the office before day 8 chemotherapy if she develops symptoms from worsening anemia.  We will plan to give at least 2 cycles before repeating imaging. Pain is controlled and lymphedema is stable  [id number] testing demonstrated no actionable mutations Plan to re-biopsy on progression.    CBC reviewed with the patient today to make sure she is not neutropenic from high risk cancer therapy drug.  We will continue to monitor CBC every 2 to 4 weeks for intense drug monitoring. Patient is on cytotoxic chemotherapy and needs intensive monitoring for severe toxicity.  BILATERAL LOWER EXTREMITY SWELLING:  Patient continues to have bilateral lower extremity swelling, DVT has been ruled out. She is on Lasix with minimal improvement and wishes to discontinue medication.  I recommend leg elevation, mobility as tolerated.     MALIGNANT BONE PAIN:  PET baseline 8/2024 d/w her. She has several pathologic fractures. Ms. Singh has established care with Palliative Care on 18 September 2024. She is currently taking: MSER 30mg bid, hydromorphone 6mg 3-4 times per day and Tizanidine 1-2 times per day.  Continue palliative care f/u; next visit scheduled for 30 JANUARY 2025.  CHEMOTHERAPY INDUCED NEUROPATHY: Mild. Continue Gabapentin, 300 mg TID.   GENETICS: [employer] Genetic Testing Results obtained from company on 1/7/2025. Patient underwent testing in July 2023. 47 genes tested with no deleterious mutations or VUS's identified. Results scanned into the EMR.   FATIGUE:  2/2 MALIGNANCY and treatment: encourage increase activity as tolerated. Mild fatigue tolerable/stable, energy waxes/wanes.  - Chemotherapy induced anemia- Grade 3. Recommend PRBC transfusion to maintain Hb> 8.   - Risk for Chemotherapy induced diarrhea- Imodium PRN. Maintain PO hydration. BRAT diet - Risk for Chemotherapy induced N/V- Will get pre-medications with Dexamethasone and Aloxi.  She will use Reglan as needed.  - GF induced bone pain- Patient to take Claritin Days 2-7. - Chemotherapy induced dysgeusia, weight loss and fatigue: Encourage oral fluids, small frequent meals.  - Chemo induced neuropathy- continue to monitor. - Alopecia- prescription for wig given. Patient utilizing head scarves/hats.  - Emotional support provided, all questions answered.  CHEMO AND BLOOD WORK ORDERED IN [place] FOR TODAY APPTS REVIEWED AND SCHEDULED DURING THIS VISIT

## 2025-02-21 NOTE — ASSESSMENT
[FreeTextEntry1] : 62yo F with:  # Metastatic triple negative breast cancer - Primary areas involved are the right femoral neck/head, R ilium, L posterior acetabulum, L ischium, L femoral shaft, and R scapula - S/p radiation while at [facility] - On chemotherapy  # Pain - Will need to adjust morphine to ER instead of IR-- regimen is Morphine ER 45mg q8h - Continue Dilaudid PO 6mg - using infrequently, no more than once daily - Continue tizanidine 2mg-- has not been asking for it - Counseled on importance of maintaining bowel regularity in light of regular opioid use.  - Spoke with [facility] director of nursing who said she will have the medical staff transition patient back to ER dosing instead of IR ([phone])  # B/l lower extremity Neuropathy  - Gabapentin 300mg TID  # Constipation - Improved, on Movantik  # Debility - Pt is physically limited due to malignancy and its sequelae  - Currently in rehab  # Encounter for Palliative Care - Explained the role of palliative care in enhancing quality of life in the setting of serious illness. Emotional support provided.     Follow up in 2 months. Instructed to call office with any questions or concerns.

## 2025-02-21 NOTE — HISTORY OF PRESENT ILLNESS
[Home] : at home, [unfilled] , at the time of the visit. [Medical Office: ([facility])___] : at the medical office located in  [Telehealth (audio & video)] : This visit was provided via telehealth using real-time 2-way audio visual technology. [FreeTextEntry1] : 60yo F presents for follow up, referred by oncology.  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   Oncology hx: She presented with a right axillary mass and arm swelling in July 2023. She went to an emergency room in [place] and diagnosed with metastatic triple negative breast cancer. She received palliative Taxol and pembrolizumab in July 2023. She underwent a course of palliative radiation therapy to the lower spine, completing treatment in September 2023. Progression of disease was noted in January 2024, so treatment was switched to carboplatin gemcitabine and pembrolizumab in February 2024.  In May 2024, she relocated from [place] to [place]. She has resumed treatment under the care of Dr. Arciniega.  CT angiogram on 7/14/24 showed extensive right axillary and supraclavicular adenopathy measuring up to 4.9 cm. CT abdomen and pelvis showed multiple lytic bone lesions involving L5, the sacrum, pelvic bones and right femoral head.  PET/CT on 8/21/24 showed avid right supraclavicular, subpectoral, and axillary lymph nodes. Multiple lytic and soft tissue avid bone metastases were present associated with pathological fractures of the right superior pubic ramus and left proximal fibula. There were multiple lesions of the right breast and skin.  She started treatment with Trodelvy on 8/23/24.  [facility] visit 9/4: She is having pain from her waist down since March 2024. The pain is most severe in the right hip region and the left lower leg. The pain is worse with weight bearing. She cannot walk without assistance. She is also having pain in her right shoulder, clavicle region when she moves her arm a certain way. She is taking hydromorphone 4mg every 6 hours and gabapentin 300mg tid, with some temporary relief. She will meet with orthopedic surgery, Dr. Lauren, next week.  Ortho visit 9/11: 61F w/ multifocal skeletal metastatic breast ca to bone. The primary areas involved are the right femoral neck/head, R ilium, L posterior acetabulum, L ischium, L femoral shaft, and R scapula. The current PET was not a full body study therefore I have rec'd a bone scan for further assessment and to establish a baseline of her current skeletal disease involvement. She is symptomatic with regards to the right hip which is at risk for developing a pathologic fracture. I would not recommend IMN at this stage given the lytic region in the femoral head. For this reason the best surgical option would be right hip hemiarthroplasty- a procedure which can be done electively or if she breaks at some point in the future. At this stage the main focus is on delivering chemotherapy, a treatment which would be halted if we proceed with surgery. For this reason, I think it's best to hold off on surgery for now while she gets systemic treatment, with the understanding that she will require hemiarthroplasty if she develops a fracture in the future. I have also recommended that she follow up with a general surgeon with regards to the right axillary adenopathy for further evaluation.    Onc 10/11/24: Patient is here now for cycle 3D1. She was admitted at [facility] with intractable pain. she received palliative RT. Pain level is 5-7 out of 10. She is followed by palliative. She is on Dilaudid and morphine. She is able to shuffle slowly and get around. Not able to bear weight.  She states that her appetite and fatigue has improved secondary to transient chemo break. Weight is stable. No neuropathy or fevers. I recommend to complete 2 additional cycles of Trodelvy followed by repeat scans.  She had low hemoglobin which is stable now. she has met with orthopedic oncology and is refusing pending surgical fixation. 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 Denies any SOB or palpitations. -----------------------------------------------------  Pt was initially referred to supportive oncology for symptom management. She is in a wheelchair, but usually ambulates with a walker. She describes pain located in R hip, radiates to R knee and anterior thigh. No numbness. Described as severe ache and spasms. Currently at 7/10. Dilaudid brings it down to 7/10. Last taken around 1pm today. Following a schedule with Dilaudid 4mg, taking at 1am, 7am, 1pm, 7pm. Relief lasts about 4 hours. The last 5 days, pain has significantly worsened. Heating pads provide some relief. The pain never goes below 7/10, and at this level she is unable to carry out ADLs, interact with family or socialize. She is also taking Gabapentin 300mg TID. Using Senna and Miralax. Had BM yesterday. Denies nausea, vomiting. Poor appetite due to pain. Poor sleep, laying in bed a lot and waking up due to pain. Difficult to express emotions due to the pain, remains quiet.  Social: Lives with mom (Kathryn) and dad (Henrik Chisholm), daughter (Irina Singh), and son. Moved from [place] 3 months ago. Worked as a nurse in [place] Advance Directives / Decision Makers: Father and daughter help with medical decisions  10/14/24 Interval History: She was recently at [facility] for uncontrolled pain and radiation treatment. She is S/p 5 fractions of radiation, ended on 10/2/24. During that admission, she was seen by palliative care team and started on Morphine ER 30mg q8h, and Dilaudid PO 4-6mg q4h PRN. She states her pain seemed to be well controlled up until 2 days ago where the pain in her hip/legs have worsened. She denies any recent trauma or fall. She is taking Dilaudid PO 4mg once in the morning and Tizanidine 2mg as needed, on average once or less per day. When she takes the Dialudid 4mg PO in the morning, she gets 1-2 hrs of relief. She was hesitant to take it any more times per day because she was concerned to mix it with her long-acting morphine. She also continues on Gabapentin 300mg TID. She continues on a bowel regimen with regular BMs. She has no other concerns today. Next chemo is this Friday.  10/21/24 Interval History: Ran out of Morphine ER 10/16. Taking Dilaudid PO 4mg 1.5 tab about 2-3x daily. She states since being off the Morphine ER, she feels she may not need to restart a long acting. She believes the radiation may be helping her pain and she doesn't need a long acting. She takes Claritin after Nulesta injections with relief for about 5 days. She is also taking Tizanidine 2mg about once daily with relief. Admits to constipation, taking Senna sporadically but now taking 3 tab at bedtime.  11/1/2024 Interval History: Since d/c the MSER she reports pain has risen, stating it rises to 10/10. She has been utilizing hydromorphone 6mg three to four times daily. She utilizes Tizanidine 2mg once to twice daily with limited relief. She states the pain lessens if she lays still. She reports pain to sacral stage III continues to offset weight, apply barrier creams and dressings. Pain from the pressure injury often wakes her at night. She is eating two meals daily, denies N/V/D. Today she presents chairside with swelling to RLE, + pedal pulses. She states she is compliant with Eliquis regimen.   11/8/2024 Interval History: Pt seen in treatment room, accompanied by daughter. She states her pain is better controlled with the resumption of MSER 30mg BID, hydromorphone 6mg PRN which she utilizes once to twice daily, tizanidine 2mg sparingly. She reports pain, in particular, to her RLE with movement. She rates the pain as 'ok' in general with exacerbations with movement, rating 10/10 on occasion. She states the hydromorphone works well in controlling these spikes in pain. She reports moderate pain to her pressure injury and reports it is 'healing nicely'. She continues to offset her weight, apply barrier cream and apply mepilex dressing regularly. She reports difficulty sleeping, no more than three hours at a time, attributes to positioning and healing pressure injury. Moderate swelling continues to DESTINEY LE, encouraged to ambulate, elevate, and maintain protein intake. She states oral intake is low, forces herself to eat. She states she has little appetite and altered taste. Defers medical cannabis at this time. Denies N/V, reports bowels are regular every 1-2 days with bowel regimen in place.   11/22/ Interval History: Pt seen in treatment room, accompanied by her daughter.  Recent PET revealed mixed response noting areas of POD. She states there will be an alteration to her chemotherapy regimen, unsure at this time. She c/w MSER 30mg BID and utilizing hydromorphone 6mg twice daily. She describes the pain often as an intermittent cramping pain to the RLE, relieved by pain regimen and tizanidine 2mg which she reports as most helpful for cramps. RLE continues to be edematous, will begin furosemide.  She continues w in home PT with passive exercises, she is unable to bear weight at this time. States pressure ulcer is healed at this time, continues to offset weight as often as possible. Appetite remains stable, bowels are regular. Mood is stable and positive.   1/30/25 interval hx: Pt states she is taking Morphine 45mg every 8 hours ( shows IR, not ER). She is not aware she is taking IR instead of ER. She is requesting I call medical director at rehab to discuss dosing. She had treatment a few days ago and she feels weak. Using Lasix for anasarca. Taking dilaudid PO 6mg -- maybe 1-2x week. Has not been using tizanidine because not ordered at rehab, and also taking Gabepentin 300mg TID. She states her pain has worsened recently and the medicine isn't lasting as it usually does. Now recognizes maybe it could be because the medication is not long-acting currently. Denies other symptoms today.  [facility] - Dr. Lopes  2/21/25 interval hx: Recent hospitalized for GIB, stable. She feels well overall. Rehab center adjustment Morphine from IR to ER. Patient is taking Morphine ER 45mg q8 and Dilaudid for breakthrough. Using Dilaudid 0-1x daily. Improvement in constipation since using Movantik. No other concerns today.     [facility] Stop Ref#: [id number]

## 2025-02-21 NOTE — PHYSICAL EXAM
[General Appearance - Alert] : alert [Hearing Threshold Finger Rub Not [place]] : hearing was normal [] : no respiratory distress [Oriented To Time, Place, And Person] : oriented to person, place, and time [Impaired Insight] : insight and judgment were intact [Affect] : the affect was normal [Mood] : the mood was normal

## 2025-03-03 NOTE — ASSESSMENT
[FreeTextEntry1] : This is a very pleasant 61-year-old female diagnosed with metastatic triple negative breast cancer (metastatic disease to bones and lymph nodes) in July 2023, received Taxol Keytruda 7/202 3-1/2024, POD bones, Carboplatin Gemcitabine and Keytruda 2/202 4-5/2024, then she decided to take a treatment break.  She has been off treatment and reports worsening of palpable metastatic lymph nodes and worsening bone pain. Trodelvy initiated on 8/23/2024. Palliative RT to the bone 9/2024. Progression noted on PET Scan completed on 11/19/2024. Case discussed with Breast Tumor Board. Patient was to start Eribulin on 12/6/24; treatment delayed secondary to anemia and significant bone pain for which patient was admitted to hospital for symptom management/stabilization. Patient received additional palliative RT while hospitalized; treatment completed 12/13/2024. Patient released from the hospital and entered rehab in late December 2024. Eribulin started 1/6/25.   METASTATIC BREAST CA: Triple Negative (ER: Negative < 1%, [place]: Negative < 1%, HER2: Negative, 1+ staining). Slide Review completed by [facility] on 8/14/2024. Original biopsy site: Right Axilla. Biopsy completed at [facility] on 7/12/2023.    3/3/25 here for eribulin today s/p 2UPRBC 3 days ago due to worsening anemia today she reports significant SOB and feeling extremely tired. Doesnt want chemo today and very concerned about breathing.  O2 sat normal on RA concern for PE vs ascites causing SOB on exam, enlarging right cervical and axillary nodes  are concerning for POD CTPA 2/3/25 had no ascites or PE Cancel chemo today and send to ER for repeat scans  ambulance called

## 2025-03-03 NOTE — HISTORY OF PRESENT ILLNESS
[Disease: _____________________] : Disease: [unfilled] [de-identified] :  This is a very pleasant 61-year-old female presented today for an evaluation for metastatic triple negative breast cancer.  Patient reports she noticed a large lump under her right arm and lymphedema in July 2023.  She went to the emergency room in [place] and workup showed metastatic triple negative breast cancer.  She underwent a right lymph node biopsy which was reportedly triple negative.  Biopsy report is not available for review.  She started palliative intent Taxol and pembrolizumab July 2023.  A PET/CT September 2023 showed mixed response, and she continued on the same regimen.  A PET/CT in January 2024 showed marked progression of disease in the bones.  Treatment was changed to carboplatin gemcitabine and pembrolizumab in February 2024.  She reportedly developed neutropenia and needed Neulasta for subsequent cycles.  PET/CT May 2024 showed stable disease.  Her last chemotherapy treatment was on 5/17/2024.  She decided to move to [place] close to family.  She has not had any treatment since then.  She reports she wanted to take a treatment break and has been taking holistic treatments.  She was disappointed that  neck and armpit masses grew during treatment break.  She was seen at [facility] last month and is referred here for continuation of care.  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  She is fasting 16 to 24 hours and has lost 40 pounds in the last 4 months.  She has significant right hip and left leg pain.  She is taking Dilaudid as needed and gabapentin.  Currently pain is 5 out of 10.  She walks with a cane and pain gets worse on walking after 1-2 blocks.  She had radiation to spine last year and developed significant toxicity and does not want to take radiation anymore. She is currently getting home PT. she reports skin discoloration from radiation and Neulasta.  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  Port has not been placed in 3 months.  She has double-lumen port.  She reports BRCA testing was done but she is not aware of results.  She lives with her parents and her daughter is supporting her.  8/23/2024 Patient presents for Trodelvy Cycle 1, Day 1. She is accompanied by her daughter. All questions asked/answered prior to initiation of treatment.  Patient underwent Port Study on 8/2124 at [facility].  Procedure Findings demonstrated a left-sided double port with tip of catheter in the SVC. The left port was accessed under sterile conditions with blood return and appropriate flushing was demonstrated. Hand injection of contrast demonstrates filling of port well and lumen without evidence of fibrin sheath. Unfortunately, multiple attempts were made with right port however it was unsuccessful. The plan per the Interventional Radiologist included: 1. Left port may be accessed and utilized for her upcoming treatment of chemotherapy. 2. Suggestion of possible replacement with a single lumen Mediport. Ms. Singh c/o of significant pain 10/10 on the pain scale. Reports she ran out of Hydromorphone and Gabapentin that was dispensed in the ER since last week. Ms. Singh reported that her pain was well controlled when taking Hydromorphone and Gabapentin as prescribed. Refills sent to [facility], and delivered at chairside. 4mg of Hydromorphone prescribed to be given at chairside prior to treatment. 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 Pathologic fractures in the right superior pubic ramus and left proximal fibula were noted on recent PET CT scan completed on 8/21/2024. Patient continues to take Eliquis daily (5 mg PO BID) for port r/t thrombus. Patient denies any s/s c/w easily bleeding or bruising.  Ms. Singh requested to speak with Nutrition today. Nutrition team was able to see the patient at the chairside. Appreciate note and recommendations. RTO: with each Cycle of Trodelvy, on Day 1. Appointments reviewed with patient and print out given.   8/28/2024 Patient presents today for repeat blood work and to complete [employer] Testing.  We again discussed the recent results of PTT/INR lab work that was significantly elevated on 8/23/2024. Patient was contacted on 8/26/2024 to initially discuss the elevated lab results. Ms. Singh reported that she was taking the Eliquis 5 mg PO BID as indicated. Upon questioning the patient further regarding the matter, she stated she ran out of the medication and needed a refill. Patient continues to deny as signs of bleeding or bruising. Results of PTT/INR today were WNL, and Eliquis 5mg PO BID was ordered to [facility].   Patient reported taking several supplements to include: Turmeric, Vitamin E, Black Seed Oil and several other non-FDA approved mixed/multi-vitamins up until Day 1 of treatment (8/23/2024). We discussed that these particular supplements may have unknown drug interactions with treatment and can also elevate both PTT & INR. The patient has since discontinued the supplements.   Discussed that [id number] testing is used to identify genomic alterations within the cancer's DNA that may make the patient eligible for a specific therapy/personalized treatment for her cancer. Patient verbalized consent and signed the necessary paperwork. Our office to convey results when they are made available.   Ms. Singh reports her pain is slightly improved since she started re-taking Dilaudid and Gabapentin as prescribed. Referral was made to Palliative Care, and an appointment was scheduled for 18 September 2024.   8/30/2024: day8 of Trodelvy,  She reports significant fatigue and altered taste x 3-4 days after chemo. She was able to function, maintained PO intake, weight stable. She had mild nausea, took Reglan, no vomiting, no diarrhea or mouth sores. No neuropathy, no fevers. Hb low, discussed PRBC tx PET baseline 8/2024 d/w her. She has several pathologic fractures. She doesnt want to go to ER, thinks they arent new. She has been hesitant about RT. Discussed importance of ortho onc consultation and pall RT for bone fractures and pain. No wt beaning until ortho consult.  She is on Eliquis 5 mg PO BID for port-related thrombus. PT/INR was significantly high, normalized on repeat labs  Has R shoulder, pelvis, LE pain somewhat controlled on Dilaudid every 4 hours, gabapentin Q3H. A. Pending palliative care appointment for pain management next month.  Does not have appetite but is taking small portions of meals. lso taking ensure Holding supplements 2 days before and after the chemo: Turmeric, Vitamin E, Black Seed Oil and several other non-FDA approved mixed/multi-vitamins up  [id number] testing sent during last visit Palliative Care, and an appointment was scheduled for 18 September 2024.   9/20/2024 Patient presents today for Cycle 2, Day 8 of Trodelvy.  She reports significant fatigue and altered taste x 3-4 days after chemo. She remains able to function, maintained PO intake, and her weight is stable. She continues to experience mild nausea, but it is improving, and no Reglan was needed after Cycle 2, Day 1. Denies fevers, vomiting, diarrhea, neuropathy or mouth sores.  Discussed low Hgb (8.1 g/dL), and possible need for PRBC transfusion. At this time, the patient denies being symptomatic and wishes to see if her bone marrow can recover on its own. She has been attending numerous appointments (Palliative, Orthopedics, RT) and is becoming overwhelmed.  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 Denies any SOB or palpitations. Ms. Singh has established care with Palliative Care on 18 September 2024 (follow-up scheduled in one week/Telemedicine Visit). Patient reports she enjoyed meeting this provider and was thankful for her recommendations to change the Hydromorphone from Q6 hours to Q4 hours. Additionally, she is now taking Tizanidine 2mg, 1 to 2 tablets every 8 hours for muscle spasms. She reports this medication has made a big difference and reports her overall pain is much more manageable/tolerable. Patient will also continue to take Gabapentin 300 mg TID. She is also now on a stricter bowel regimen taking Colace TID and Miralax daily. She is having BM's every other day.  Ms. Singh also met with Dr. Serena Bustos from Radiation Oncology on 9 September 2024. She reports being much more open to the idea of RT for pain relief and signed informed consent at the initial consultation. It was discussed at that visit a plan of care will be created once the patient established care with Dr. Lauren.  Ms. Singh established care with Dr. Subhash Lauren from Orthopedic Surgery. Patient was offered Right Hip Hemiarthroplasty and surgical stabilization of the Left femur. The patient declined surgical intervention and was instructed to return to the office in 1 month for repeat x-ray imaging.  Patient continues to hold supplements 2 days before and after the chemo: Turmeric, Vitamin E, Black Seed Oil and several other non-FDA approved mixed/multi-vitamins. RTO: with each Cycle of Trodelvy, on Day 8.  11/22/24 Patient had imaging done on 11/2024 . Upon review of PET/CT films, my interpretation is that patient has mixed response.  Some of the valentin disease and bone mets are improved but she has new and worsening bony lesions as well as fracture causing symptoms.  She has new lung metastases.  Imaging was reviewed in the tumor board and it was consensus to switch her treatment due to overall worsening disease. I reviewed these findings with the patient and daughter.  We will switch to eribulin She has worsening bilateral lower extremity swelling.  She had Doppler done recently to rule out DVT.  She is mostly sedentary due to pelvic fractures and pain.  She is taking pain medication for pain control.  Recommend Lasix for lower extremity swelling.  She is also referred to radiation oncology for radiation to painful bony left hip metastasis  She has malignant bone pain.  She is followed by palliative.  She is on Dilaudid and morphine.  She is able to shuffle slowly and get around.  Not able to bear weight. She had low hemoglobin which is stable now. she has met with orthopedic oncology and is refusing surgical fixation. 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 Denies any SOB or palpitations.  Port was replaced last week.  Lower extremity Doppler 11/2024 did not show evidence of DVT   12/6/2024: Patient presents for New Drug Eribulin, Day 1 Review of CBC revealed a Hgb of 7 g/dL. Discussed delay of treatment today given critical lab value/not meeting parameters to treat. PRBC infusion is necessary at this time. After much discussion surrounding the plan of care, it was suggested the patient be transferred to [facility] via Ambulance secondary to current physical state. The patient and daughter in agreement.  Patient reports she is no longer taking Eliquis OR Lasix daily, but rather every 2-3 days. The patient reports she is now taking Turmeric again because she prefers to be more homeopathic. Additionally, last prescription refill of Eliquis 5 mg refilled on 9/24/24 (30-day supply). Discussed importance of compliance with medication given history of port-related thrombus and hypercoagulable state given chemotherapy.  Ms. Singh reports she is not taking the Lasix as directed; believes it only helped a touch and wishes to seek referral to [facility] Lymphedema program. Swelling in bilateral lower extremities prevalent on PE. Lower extremity Doppler completed on 11/1/2024 did not show evidence of DVT.  Ms. Singh also reports some difficulty swallowing with mild edema being appreciated on the right side of her neck. Patient reports she almost contacted 911 this week secondary.  Ms. Singh is scheduled to undergo Palliative RT from the 19th through the 25th. Patient reports she is anxious about waiting until then given the level of pain she is experiencing secondary to bony metastases. Patient reports she has developed worsening LBP that radiates toward her right lower abdomen/pelvis. Additionally, she reports pain the now affects her left foot, specifically the dorsal aspect. She remains unable to walk, utilizing a wheelchair and rolling walker at home. Utilizing an ambulette to get to and from appointments.  Patient is UTD with Palliative Care. She is currently taking: MSER 30mg bid, hydromorphone 6mg 3-4 times per day and Tizanidine 1-2 times per day. Pain scale at today's appointment: 8/10.  RTC: Once released from [facility]. Will work with in-patient Oncology and schedule in office follow-up as appropriate.   12/2024 Patient was admitted at [facility] 2 weeks ago with severe bone pain. She had repeat scans in the hospital which showed known osseous metastatic disease and impacted pathologic right femoral fracture.  Ortho oncology saw the patient but patient declined surgical intervention.  She received radiation therapy completed 12/13/2024. She also had severe bilateral lower extremity swelling. Workup showed no DVT, preserved EF.  Lower extremity  swelling is felt to be secondary to lymphedema.  She is currently at a rehab. She is unable to walk. due to debility and swelling.   Pain is reasonably well-controlled.  She is eating okay.  She is hoping to start chemotherapy ASAP.  We discussed 2 to 3 weeks of physical therapy to get stronger.  Will start eribulin January 6.  She is on dexamethasone. Recommend to taper over 2 to 3 weeks  1/6/2025 Patient has completed radiation therapy last month.  She is currently in the rehab.  Still on dexamethasone.  Recommended to taper.  Here to start chemotherapy today.  Eribulin Risks benefits reviewed.  Informed consent obtained. Patient continues to have lower extremity swelling, DVT has been ruled out.  She is on Lasix with minimal improvement.  I recommend leg elevation, mobility as tolerated. She is on a stable pain regimen, followed by palliative Appetite good, weight stable Plan to  give 3-4 cycles of eribulin and repeat scans Xgeva to decrease the risk of skeletal metS, will add on day 1.  Every 6 weeks                                                                                                                                             Continue follow-up with palliative for pain control  1/2024 Presently, the patient reports she is doing well today, but did go to the [facility] ER on 1/10/25 c/o diffuse abdominal pain, diarrhea, and nausea. Blood work, Chest X-Ray and CT scan of the Abdomen and Pelvis were obtained.  CHEST X-RAY IMPRESSION: Right-sided CT compatible port with tip in the SVC. Elevated right hemidiaphragm. The lungs are clear. Heart is not enlarged considering technique and no effusions or pneumothorax were noted.  CT scan of the ABDOMEN & PELVIS IMPRESSION: Grossly stable study compared to 12/8/2024. 1.  Stable moderate right and moderate left hydronephrosis secondary to mid to distal ureteral encasement/obliteration by retroperitoneal soft tissue density. 2.  Stable retroperitoneal and pelvic lymphadenopathy and osseous metastatic disease. 3.  New small bilateral pleural effusions and severe anasarca suggestive of fluid overload state/third spacing. The patient was not admitted and released that evening.  Ms. Singh reports that she no longer wishes to undergo treatment with XGEVA. She believes that the injection caused abdominal swelling and her recent trip to the hospital for diffuse abdominal pain, nausea and diarrhea. We discussed that XGEVA is used to prevent skeletal-related events (SREs) in patients with bone metastases. I reiterated that common side effects of XGEVA include: fatigue, mild nausea, back pain, and jaw problems (osteonecrosis of the jaw) and that the XGEVA was likely not the root cause of her symptoms. Despite reiteration of the benefits of XGEVA, the patient wishes to decline additional treatment with this medication going forward. Of note, she reports this medication was offered to her while living in [place], where she too declined treatment.  Ms. Singh reports Dr. Lind and her team have been instrumental in managing her pain. She is scheduled to participate in a TEB on 30 January 2024. On PE exam today, the appearance of two new palpable cervical lymph nodes were noted (right neck). Patient continues to have bilateral lower extremity swelling, DVT has been ruled out. She is on Lasix with minimal improvement and wishes to discontinue medication.  I recommend leg elevation, mobility as tolerated. Office to discuss discontinuation of treatment with [facility]. RTO: With Each Cycle of Eribulin; appointments made through mid-March 2025  [de-identified] : This is a very pleasant 61-year-old female diagnosed with metastatic triple negative breast cancer (metastatic disease to bones and lymph nodes) in July 2023, received Taxol Keytruda 7/202 3-1/2024, POD bones, Carboplatin Gemcitabine and Keytruda 2/2024 4-5/2024, then she decided to take a treatment break.  She has been off treatment and reports worsening of palpable metastatic lymph nodes and worsening bone pain. Trodelvy initiated on 8/23/2024.  Palliative RT to the bone 9/2024. Progression noted on PET Scan completed on 11/19/2024. Case discussed with Breast Tumor Board. Patient was to start Eribulin on 12/6/24; treatment delayed secondary to anemia and significant bone pain for which patient was admitted to hospital for symptom management/stabilization. Patient received additional palliative RT while hospitalized; treatment completed 12/13/2024. Patient released from the hospital and entered rehab in late December 2024. Eribulin started 1/6/25.   2/12/2025 Patient received cycle 2-day 1 eribulin 2 weeks ago.  Her hemoglobin was 8 at that time and she was feeling good.  She came a week later and felt extremely exhausted.  Hemoglobin was 7.4.  She was sent to the hospital for urgent transfusion due to symptomatic anemia.  Workup also showed concern for GI bleed but it was later felt to be secondary to hemorrhoids.  A CT chest showed 1 mm increase in the size of pulmonary nodules.  I discussed with the patient today that she has had 2 doses of eribulin in the last 6 weeks.  It is too early to say if she is not responding to eribulin.  She did notice that a right cervical lymph node has decreased in size after chemotherapy.  I recommend to continue chemotherapy and try to stay on the schedule before determining response.  I offered her to come for transfusion in the next 3 to 5 days to avoid symptomatic anemia on day 8.  Patient declined PRBC transfusion at this time.  She was encouraged to call the office before day 8 chemotherapy if she develops symptoms from worsening anemia.  We will plan to give at least 2 cycles before repeating imaging. Pain is controlled and lymphedema is stable Ms. Singh presents today for Cycle 2 Day 1 of Eribulin + ONPRO.  She is accompanied by a member of the healthcare team from the [facility]. She is on ebony and mag suppls  She is refusing xgeva despite multiple discussions that it prevents SRE including fractures ( she states her tumors are determined )  PAin control with Dr Lind    3/3/25 here for eribulin today s/p 2UPRBC 3 days ago due to worsening anemia today she reports significant SOB and feeling extremely tired. Doesnt want chemo today and very concerned about breathing.  O2 sat normal on RA concern for PE vs ascites causing SOB on exam, enlarging right cervical and axillary nodes  are concerning for POD CTPA 2/3/25 had no ascites or PE Cancel chemo today and send to ER for repeat scans  ambulance called

## 2025-03-03 NOTE — PHYSICAL EXAM
[Ambulatory and capable of all self care but unable to carry out any work activities] : Status 2- Ambulatory and capable of all self care but unable to carry out any work activities. Up and about more than 50% of waking hours [Normal] : affect appropriate [de-identified] : in wheelchair  [de-identified] : two new palpable cervical lymph nodes were noted (right neck)  [de-identified] : large right axillary and right cervical LN [de-identified] : bilateral edema prevalent in lower extremities [de-identified] : flat